# Patient Record
Sex: MALE | Race: BLACK OR AFRICAN AMERICAN | NOT HISPANIC OR LATINO | URBAN - METROPOLITAN AREA
[De-identification: names, ages, dates, MRNs, and addresses within clinical notes are randomized per-mention and may not be internally consistent; named-entity substitution may affect disease eponyms.]

---

## 2022-08-15 ENCOUNTER — OFFICE VISIT (OUTPATIENT)
Dept: URGENT CARE | Facility: CLINIC | Age: 42
End: 2022-08-15
Payer: COMMERCIAL

## 2022-08-15 ENCOUNTER — HOSPITAL ENCOUNTER (INPATIENT)
Facility: OTHER | Age: 42
LOS: 2 days | Discharge: HOME | DRG: 176 | End: 2022-08-18
Attending: EMERGENCY MEDICINE | Admitting: INTERNAL MEDICINE
Payer: COMMERCIAL

## 2022-08-15 VITALS
HEIGHT: 71 IN | RESPIRATION RATE: 28 BRPM | DIASTOLIC BLOOD PRESSURE: 117 MMHG | BODY MASS INDEX: 44.1 KG/M2 | SYSTOLIC BLOOD PRESSURE: 192 MMHG | TEMPERATURE: 98 F | WEIGHT: 315 LBS | HEART RATE: 140 BPM | OXYGEN SATURATION: 91 %

## 2022-08-15 DIAGNOSIS — R06.00 DYSPNEA: ICD-10-CM

## 2022-08-15 DIAGNOSIS — I26.99 BILATERAL PULMONARY EMBOLISM: Primary | ICD-10-CM

## 2022-08-15 DIAGNOSIS — R79.89 ELEVATED TROPONIN: ICD-10-CM

## 2022-08-15 DIAGNOSIS — I26.99 PULMONARY EMBOLISM: ICD-10-CM

## 2022-08-15 DIAGNOSIS — Z86.718 HISTORY OF DVT (DEEP VEIN THROMBOSIS): ICD-10-CM

## 2022-08-15 DIAGNOSIS — R79.81 LOW OXYGEN SATURATION: ICD-10-CM

## 2022-08-15 DIAGNOSIS — R06.02 SHORTNESS OF BREATH: Primary | ICD-10-CM

## 2022-08-15 LAB
ANION GAP SERPL CALC-SCNC: 13 MMOL/L (ref 8–16)
BASOPHILS # BLD AUTO: 0.02 K/UL (ref 0–0.2)
BASOPHILS NFR BLD: 0.2 % (ref 0–1.9)
BNP SERPL-MCNC: 12 PG/ML (ref 0–99)
BUN SERPL-MCNC: 11 MG/DL (ref 6–20)
CALCIUM SERPL-MCNC: 9.2 MG/DL (ref 8.7–10.5)
CHLORIDE SERPL-SCNC: 103 MMOL/L (ref 95–110)
CO2 SERPL-SCNC: 24 MMOL/L (ref 23–29)
CREAT SERPL-MCNC: 1 MG/DL (ref 0.5–1.4)
CREAT SERPL-MCNC: 1.1 MG/DL (ref 0.5–1.4)
CTP QC/QA: YES
DIFFERENTIAL METHOD: ABNORMAL
EOSINOPHIL # BLD AUTO: 0 K/UL (ref 0–0.5)
EOSINOPHIL NFR BLD: 0.2 % (ref 0–8)
ERYTHROCYTE [DISTWIDTH] IN BLOOD BY AUTOMATED COUNT: 13.3 % (ref 11.5–14.5)
EST. GFR  (NO RACE VARIABLE): >60 ML/MIN/1.73 M^2
GLUCOSE SERPL-MCNC: 189 MG/DL (ref 70–110)
HCT VFR BLD AUTO: 43.2 % (ref 40–54)
HCV AB SERPL QL IA: NEGATIVE
HGB BLD-MCNC: 13.9 G/DL (ref 14–18)
HIV 1+2 AB+HIV1 P24 AG SERPL QL IA: NEGATIVE
IMM GRANULOCYTES # BLD AUTO: 0.02 K/UL (ref 0–0.04)
IMM GRANULOCYTES NFR BLD AUTO: 0.2 % (ref 0–0.5)
LYMPHOCYTES # BLD AUTO: 1.2 K/UL (ref 1–4.8)
LYMPHOCYTES NFR BLD: 11.3 % (ref 18–48)
MCH RBC QN AUTO: 26.8 PG (ref 27–31)
MCHC RBC AUTO-ENTMCNC: 32.2 G/DL (ref 32–36)
MCV RBC AUTO: 83 FL (ref 82–98)
MONOCYTES # BLD AUTO: 0.7 K/UL (ref 0.3–1)
MONOCYTES NFR BLD: 6.6 % (ref 4–15)
NEUTROPHILS # BLD AUTO: 9 K/UL (ref 1.8–7.7)
NEUTROPHILS NFR BLD: 81.5 % (ref 38–73)
NRBC BLD-RTO: 0 /100 WBC
PLATELET # BLD AUTO: 171 K/UL (ref 150–450)
PMV BLD AUTO: 11.1 FL (ref 9.2–12.9)
POTASSIUM SERPL-SCNC: 4.5 MMOL/L (ref 3.5–5.1)
RBC # BLD AUTO: 5.18 M/UL (ref 4.6–6.2)
SAMPLE: NORMAL
SARS-COV-2 RDRP RESP QL NAA+PROBE: NEGATIVE
SODIUM SERPL-SCNC: 140 MMOL/L (ref 136–145)
TROPONIN I SERPL DL<=0.01 NG/ML-MCNC: 1.41 NG/ML (ref 0–0.03)
WBC # BLD AUTO: 10.97 K/UL (ref 3.9–12.7)

## 2022-08-15 PROCEDURE — 1159F MED LIST DOCD IN RCRD: CPT | Mod: CPTII,S$GLB,, | Performed by: NURSE PRACTITIONER

## 2022-08-15 PROCEDURE — 1159F PR MEDICATION LIST DOCUMENTED IN MEDICAL RECORD: ICD-10-PCS | Mod: CPTII,S$GLB,, | Performed by: NURSE PRACTITIONER

## 2022-08-15 PROCEDURE — 1160F PR REVIEW ALL MEDS BY PRESCRIBER/CLIN PHARMACIST DOCUMENTED: ICD-10-PCS | Mod: CPTII,S$GLB,, | Performed by: NURSE PRACTITIONER

## 2022-08-15 PROCEDURE — 83880 ASSAY OF NATRIURETIC PEPTIDE: CPT | Performed by: EMERGENCY MEDICINE

## 2022-08-15 PROCEDURE — 3008F BODY MASS INDEX DOCD: CPT | Mod: CPTII,S$GLB,, | Performed by: NURSE PRACTITIONER

## 2022-08-15 PROCEDURE — 80048 BASIC METABOLIC PNL TOTAL CA: CPT | Performed by: EMERGENCY MEDICINE

## 2022-08-15 PROCEDURE — 84484 ASSAY OF TROPONIN QUANT: CPT | Performed by: EMERGENCY MEDICINE

## 2022-08-15 PROCEDURE — 25500020 PHARM REV CODE 255: Performed by: EMERGENCY MEDICINE

## 2022-08-15 PROCEDURE — 3080F PR MOST RECENT DIASTOLIC BLOOD PRESSURE >= 90 MM HG: ICD-10-PCS | Mod: CPTII,S$GLB,, | Performed by: NURSE PRACTITIONER

## 2022-08-15 PROCEDURE — 93005 ELECTROCARDIOGRAM TRACING: CPT | Mod: S$GLB,,, | Performed by: NURSE PRACTITIONER

## 2022-08-15 PROCEDURE — 71046 X-RAY EXAM CHEST 2 VIEWS: CPT | Mod: S$GLB,,, | Performed by: RADIOLOGY

## 2022-08-15 PROCEDURE — 93010 EKG 12-LEAD: ICD-10-PCS | Mod: S$GLB,,, | Performed by: INTERNAL MEDICINE

## 2022-08-15 PROCEDURE — 71046 XR CHEST PA AND LATERAL: ICD-10-PCS | Mod: S$GLB,,, | Performed by: RADIOLOGY

## 2022-08-15 PROCEDURE — 99205 OFFICE O/P NEW HI 60 MIN: CPT | Mod: S$GLB,,, | Performed by: NURSE PRACTITIONER

## 2022-08-15 PROCEDURE — 3008F PR BODY MASS INDEX (BMI) DOCUMENTED: ICD-10-PCS | Mod: CPTII,S$GLB,, | Performed by: NURSE PRACTITIONER

## 2022-08-15 PROCEDURE — 1160F RVW MEDS BY RX/DR IN RCRD: CPT | Mod: CPTII,S$GLB,, | Performed by: NURSE PRACTITIONER

## 2022-08-15 PROCEDURE — 3080F DIAST BP >= 90 MM HG: CPT | Mod: CPTII,S$GLB,, | Performed by: NURSE PRACTITIONER

## 2022-08-15 PROCEDURE — 3077F SYST BP >= 140 MM HG: CPT | Mod: CPTII,S$GLB,, | Performed by: NURSE PRACTITIONER

## 2022-08-15 PROCEDURE — 93005 EKG 12-LEAD: ICD-10-PCS | Mod: S$GLB,,, | Performed by: NURSE PRACTITIONER

## 2022-08-15 PROCEDURE — 86803 HEPATITIS C AB TEST: CPT | Performed by: EMERGENCY MEDICINE

## 2022-08-15 PROCEDURE — 93010 ELECTROCARDIOGRAM REPORT: CPT | Mod: S$GLB,,, | Performed by: INTERNAL MEDICINE

## 2022-08-15 PROCEDURE — 3077F PR MOST RECENT SYSTOLIC BLOOD PRESSURE >= 140 MM HG: ICD-10-PCS | Mod: CPTII,S$GLB,, | Performed by: NURSE PRACTITIONER

## 2022-08-15 PROCEDURE — U0002 COVID-19 LAB TEST NON-CDC: HCPCS | Mod: QW,S$GLB,, | Performed by: NURSE PRACTITIONER

## 2022-08-15 PROCEDURE — 85025 COMPLETE CBC W/AUTO DIFF WBC: CPT | Performed by: EMERGENCY MEDICINE

## 2022-08-15 PROCEDURE — 87389 HIV-1 AG W/HIV-1&-2 AB AG IA: CPT | Performed by: EMERGENCY MEDICINE

## 2022-08-15 PROCEDURE — 99205 PR OFFICE/OUTPT VISIT, NEW, LEVL V, 60-74 MIN: ICD-10-PCS | Mod: S$GLB,,, | Performed by: NURSE PRACTITIONER

## 2022-08-15 PROCEDURE — U0002: ICD-10-PCS | Mod: QW,S$GLB,, | Performed by: NURSE PRACTITIONER

## 2022-08-15 PROCEDURE — 99285 EMERGENCY DEPT VISIT HI MDM: CPT | Mod: 25

## 2022-08-15 RX ORDER — ALBUTEROL SULFATE 90 UG/1
1-2 AEROSOL, METERED RESPIRATORY (INHALATION) EVERY 4 HOURS PRN
Status: ON HOLD | COMMUNITY
Start: 2021-09-09 | End: 2022-08-18 | Stop reason: SDUPTHER

## 2022-08-15 RX ORDER — APIXABAN 5 MG/1
5 TABLET, FILM COATED ORAL 2 TIMES DAILY
Status: ON HOLD | COMMUNITY
Start: 2022-06-12 | End: 2022-08-18 | Stop reason: HOSPADM

## 2022-08-15 RX ORDER — METFORMIN HYDROCHLORIDE 500 MG/1
500 TABLET ORAL DAILY
COMMUNITY
Start: 2021-09-10 | End: 2022-08-25

## 2022-08-15 RX ADMIN — IOHEXOL 100 ML: 350 INJECTION, SOLUTION INTRAVENOUS at 11:08

## 2022-08-15 NOTE — PROGRESS NOTES
"Subjective:       Patient ID: Hany Devries is a 42 y.o. male.    Vitals:  height is 5' 11" (1.803 m) and weight is 158.8 kg (350 lb) (abnormal). His tympanic temperature is 98.1 °F (36.7 °C). His blood pressure is 192/117 (abnormal) and his pulse is 140 (abnormal). His respiration is 28 (abnormal) and oxygen saturation is 91% (abnormal).     Chief Complaint: Shortness of Breath    Patient reports dyspnea upon exertion.  He notes that he had an episode after moving boxes out of his trunk earlier today and now when he walks or exerts himself.  He had some palpitations and chest pain this morning.  He notes that he's under a lot of stress with recent move, trying to buy a house, and having his daughter start school.  Denies chest pain at present.  History of asthma, he used his inhaler which helped some but did not really.  Denies cough, fever, congestion.  He has had driving back and forth to [place] recently with move.  He denies leg pain or swelling.  He does have a history of DVTs but is not currently on his Eliquis due to change in insurance and difficulty getting pre approval.      Shortness of Breath  This is a new problem. The current episode started today. The problem occurs constantly. The problem has been gradually improving. Associated symptoms include chest pain. Pertinent negatives include no abdominal pain, claudication, coryza, ear pain, fever, headaches, hemoptysis, leg pain, leg swelling, neck pain, orthopnea, PND, rash, rhinorrhea, sore throat, sputum production, swollen glands, syncope, vomiting or wheezing. The symptoms are aggravated by any activity. Associated symptoms comments: Chest pain. Treatments tried: Inhaler. The treatment provided mild relief. His past medical history is significant for asthma.       Constitution: Negative for fever.   HENT: Negative for ear pain and sore throat.    Neck: Negative for neck pain.   Cardiovascular: Positive for chest pain. Negative for leg swelling and " passing out.   Respiratory: Positive for chest tightness, shortness of breath and asthma. Negative for sputum production, bloody sputum and wheezing.    Gastrointestinal: Negative for abdominal pain and vomiting.   Skin: Negative for rash.   Allergic/Immunologic: Positive for asthma.   Neurological: Negative for headaches.   Psychiatric/Behavioral: Positive for nervous/anxious. The patient is nervous/anxious.        Objective:      Physical Exam   Constitutional: He is oriented to person, place, and time. He appears well-developed. He is cooperative.  Non-toxic appearance. He does not appear ill. No distress. obesity  HENT:   Head: Normocephalic and atraumatic.   Ears:   Right Ear: Hearing, tympanic membrane, external ear and ear canal normal.   Left Ear: Hearing, tympanic membrane, external ear and ear canal normal.   Nose: Nose normal. No mucosal edema, rhinorrhea or nasal deformity. No epistaxis. Right sinus exhibits no maxillary sinus tenderness and no frontal sinus tenderness. Left sinus exhibits no maxillary sinus tenderness and no frontal sinus tenderness.   Mouth/Throat: Uvula is midline, oropharynx is clear and moist and mucous membranes are normal. No trismus in the jaw. Normal dentition. No uvula swelling. No posterior oropharyngeal erythema.   Eyes: Conjunctivae and lids are normal. Right eye exhibits no discharge. Left eye exhibits no discharge. No scleral icterus.   Neck: Trachea normal and phonation normal. Neck supple.   Cardiovascular: Normal rate, regular rhythm, normal heart sounds and normal pulses.   Pulmonary/Chest: Effort normal and breath sounds normal. Tachypnea noted. No respiratory distress. He has no wheezes.   Abdominal: Normal appearance and bowel sounds are normal. He exhibits no distension, no pulsatile midline mass and no mass. Soft. There is no abdominal tenderness.   Musculoskeletal: Normal range of motion.         General: No deformity. Normal range of motion.   Neurological: He  is alert and oriented to person, place, and time. He exhibits normal muscle tone. Coordination normal.   Skin: Skin is warm, dry, intact, not diaphoretic and not pale.   Psychiatric: His speech is normal and behavior is normal. Judgment and thought content normal.   Nursing note and vitals reviewed.    Results for orders placed or performed in visit on 08/15/22   POCT COVID-19 Rapid Screening   Result Value Ref Range    POC Rapid COVID Negative Negative     Acceptable Yes      X-Ray Chest PA And Lateral    Result Date: 8/15/2022  EXAMINATION: XR CHEST PA AND LATERAL CLINICAL HISTORY: Shortness of breath TECHNIQUE: PA and lateral views of the chest were performed. COMPARISON: None FINDINGS: The lungs are well expanded and clear. No focal opacities are seen. The pleural spaces are clear. The cardiac silhouette is unremarkable. The visualized osseous structures are unremarkable.     No acute abnormality. Electronically signed by: Alex Espana Date:    08/15/2022 Time:    19:23        Assessment:       1. Shortness of breath    2. Low oxygen saturation    3. History of DVT (deep vein thrombosis)          Plan:       Patient w/ sitting initially had room air pulse ox of 95-96 % on room air pulse ox.  However, I had him ambulate up and down the bazan and he did have dyspnea upon exertion with room air pulse ox dipping into 80s to low 90s at rest with tachycardia.  EKG shows sinus tachycardia with no acute ST findings.  Concern for PE rule out with history of obesity, shortness of breath upon exertion, low oxygen, tachycardia with history of DVTs and not being on his blood thinners.  No recent leg pain or swelling.  No pain with inspiration.  Here with wife who is going to bring him to the ER.  Case discussed with Dr. Izaguirre in clinic who agrees to MDM and plan of care.  [facility] contacted and I gave report to Samira in the ER.  Covid 19 negative and chest xray negative.  Shortness of breath  -     EKG  12-lead  -     POCT COVID-19 Rapid Screening  -     X-Ray Chest PA And Lateral; Future; Expected date: 08/15/2022  -     Refer to Emergency Dept.    Low oxygen saturation  -     Refer to Emergency Dept.    History of DVT (deep vein thrombosis)  -     Refer to Emergency Dept.

## 2022-08-16 PROBLEM — R79.89 ELEVATED TROPONIN: Status: ACTIVE | Noted: 2022-08-16

## 2022-08-16 PROBLEM — I26.99 ACUTE PULMONARY EMBOLISM: Status: ACTIVE | Noted: 2022-08-16

## 2022-08-16 LAB
ALBUMIN SERPL BCP-MCNC: 3.7 G/DL (ref 3.5–5.2)
ALP SERPL-CCNC: 54 U/L (ref 55–135)
ALT SERPL W/O P-5'-P-CCNC: 39 U/L (ref 10–44)
ANION GAP SERPL CALC-SCNC: 12 MMOL/L (ref 8–16)
APTT BLDCRRT: 65.2 SEC (ref 21–32)
AST SERPL-CCNC: 32 U/L (ref 10–40)
AV INDEX (PROSTH): 0.71
AV MEAN GRADIENT: 2 MMHG
AV PEAK GRADIENT: 3 MMHG
AV VALVE AREA: 3.16 CM2
AV VELOCITY RATIO: 0.74
BASOPHILS # BLD AUTO: 0.01 K/UL (ref 0–0.2)
BASOPHILS # BLD AUTO: 0.02 K/UL (ref 0–0.2)
BASOPHILS # BLD AUTO: 0.02 K/UL (ref 0–0.2)
BASOPHILS NFR BLD: 0.1 % (ref 0–1.9)
BASOPHILS NFR BLD: 0.2 % (ref 0–1.9)
BASOPHILS NFR BLD: 0.2 % (ref 0–1.9)
BILIRUB SERPL-MCNC: 0.8 MG/DL (ref 0.1–1)
BSA FOR ECHO PROCEDURE: 2.89 M2
BUN SERPL-MCNC: 8 MG/DL (ref 6–20)
CALCIUM SERPL-MCNC: 9.2 MG/DL (ref 8.7–10.5)
CHLORIDE SERPL-SCNC: 104 MMOL/L (ref 95–110)
CO2 SERPL-SCNC: 24 MMOL/L (ref 23–29)
CREAT SERPL-MCNC: 1.1 MG/DL (ref 0.5–1.4)
CV ECHO LV RWT: 0.73 CM
DIFFERENTIAL METHOD: ABNORMAL
DOP CALC AO PEAK VEL: 0.91 M/S
DOP CALC AO VTI: 14.97 CM
DOP CALC LVOT AREA: 4.5 CM2
DOP CALC LVOT DIAMETER: 2.39 CM
DOP CALC LVOT PEAK VEL: 0.67 M/S
DOP CALC LVOT STROKE VOLUME: 47.35 CM3
DOP CALCLVOT PEAK VEL VTI: 10.56 CM
E WAVE DECELERATION TIME: 92.03 MSEC
E/E' RATIO: 9.88 M/S
ECHO LV POSTERIOR WALL: 1.35 CM (ref 0.6–1.1)
EJECTION FRACTION: 65 %
EOSINOPHIL # BLD AUTO: 0.1 K/UL (ref 0–0.5)
EOSINOPHIL # BLD AUTO: 0.2 K/UL (ref 0–0.5)
EOSINOPHIL # BLD AUTO: 0.2 K/UL (ref 0–0.5)
EOSINOPHIL NFR BLD: 0.6 % (ref 0–8)
EOSINOPHIL NFR BLD: 1.4 % (ref 0–8)
EOSINOPHIL NFR BLD: 1.4 % (ref 0–8)
ERYTHROCYTE [DISTWIDTH] IN BLOOD BY AUTOMATED COUNT: 13.3 % (ref 11.5–14.5)
EST. GFR  (NO RACE VARIABLE): >60 ML/MIN/1.73 M^2
ESTIMATED AVG GLUCOSE: 169 MG/DL (ref 68–131)
FRACTIONAL SHORTENING: 37 % (ref 28–44)
GLUCOSE SERPL-MCNC: 166 MG/DL (ref 70–110)
HBA1C MFR BLD: 7.5 % (ref 4–5.6)
HCT VFR BLD AUTO: 42.6 % (ref 40–54)
HCT VFR BLD AUTO: 42.6 % (ref 40–54)
HCT VFR BLD AUTO: 42.8 % (ref 40–54)
HGB BLD-MCNC: 13.8 G/DL (ref 14–18)
HGB BLD-MCNC: 13.8 G/DL (ref 14–18)
HGB BLD-MCNC: 14.2 G/DL (ref 14–18)
IMM GRANULOCYTES # BLD AUTO: 0.03 K/UL (ref 0–0.04)
IMM GRANULOCYTES # BLD AUTO: 0.04 K/UL (ref 0–0.04)
IMM GRANULOCYTES # BLD AUTO: 0.04 K/UL (ref 0–0.04)
IMM GRANULOCYTES NFR BLD AUTO: 0.3 % (ref 0–0.5)
IMM GRANULOCYTES NFR BLD AUTO: 0.4 % (ref 0–0.5)
IMM GRANULOCYTES NFR BLD AUTO: 0.4 % (ref 0–0.5)
INTERVENTRICULAR SEPTUM: 1.69 CM (ref 0.6–1.1)
IVRT: 60.89 MSEC
LA MAJOR: 5.22 CM
LA MINOR: 4.61 CM
LA WIDTH: 3.25 CM
LEFT ATRIUM SIZE: 3.31 CM
LEFT ATRIUM VOLUME INDEX MOD: 21.6 ML/M2
LEFT ATRIUM VOLUME INDEX: 16.4 ML/M2
LEFT ATRIUM VOLUME MOD: 59 CM3
LEFT ATRIUM VOLUME: 44.77 CM3
LEFT INTERNAL DIMENSION IN SYSTOLE: 2.32 CM (ref 2.1–4)
LEFT VENTRICLE DIASTOLIC VOLUME INDEX: 21.37 ML/M2
LEFT VENTRICLE DIASTOLIC VOLUME: 58.33 ML
LEFT VENTRICLE MASS INDEX: 78 G/M2
LEFT VENTRICLE SYSTOLIC VOLUME INDEX: 6.8 ML/M2
LEFT VENTRICLE SYSTOLIC VOLUME: 18.6 ML
LEFT VENTRICULAR INTERNAL DIMENSION IN DIASTOLE: 3.71 CM (ref 3.5–6)
LEFT VENTRICULAR MASS: 213.99 G
LV LATERAL E/E' RATIO: 9.88 M/S
LV SEPTAL E/E' RATIO: 9.88 M/S
LYMPHOCYTES # BLD AUTO: 2.2 K/UL (ref 1–4.8)
LYMPHOCYTES # BLD AUTO: 2.7 K/UL (ref 1–4.8)
LYMPHOCYTES # BLD AUTO: 2.7 K/UL (ref 1–4.8)
LYMPHOCYTES NFR BLD: 20.4 % (ref 18–48)
LYMPHOCYTES NFR BLD: 25.4 % (ref 18–48)
LYMPHOCYTES NFR BLD: 25.4 % (ref 18–48)
MAGNESIUM SERPL-MCNC: 1.8 MG/DL (ref 1.6–2.6)
MCH RBC QN AUTO: 26.6 PG (ref 27–31)
MCH RBC QN AUTO: 26.6 PG (ref 27–31)
MCH RBC QN AUTO: 26.9 PG (ref 27–31)
MCHC RBC AUTO-ENTMCNC: 32.4 G/DL (ref 32–36)
MCHC RBC AUTO-ENTMCNC: 32.4 G/DL (ref 32–36)
MCHC RBC AUTO-ENTMCNC: 33.2 G/DL (ref 32–36)
MCV RBC AUTO: 81 FL (ref 82–98)
MCV RBC AUTO: 82 FL (ref 82–98)
MCV RBC AUTO: 82 FL (ref 82–98)
MONOCYTES # BLD AUTO: 0.8 K/UL (ref 0.3–1)
MONOCYTES NFR BLD: 7.1 % (ref 4–15)
MONOCYTES NFR BLD: 7.7 % (ref 4–15)
MONOCYTES NFR BLD: 7.7 % (ref 4–15)
MV PEAK E VEL: 0.79 M/S
MV STENOSIS PRESSURE HALF TIME: 26.69 MS
MV VALVE AREA P 1/2 METHOD: 8.24 CM2
NEUTROPHILS # BLD AUTO: 6.9 K/UL (ref 1.8–7.7)
NEUTROPHILS # BLD AUTO: 6.9 K/UL (ref 1.8–7.7)
NEUTROPHILS # BLD AUTO: 7.7 K/UL (ref 1.8–7.7)
NEUTROPHILS NFR BLD: 64.9 % (ref 38–73)
NEUTROPHILS NFR BLD: 64.9 % (ref 38–73)
NEUTROPHILS NFR BLD: 71.5 % (ref 38–73)
NRBC BLD-RTO: 0 /100 WBC
PHOSPHATE SERPL-MCNC: 4.1 MG/DL (ref 2.7–4.5)
PISA TR MAX VEL: 2.9 M/S
PLATELET # BLD AUTO: 169 K/UL (ref 150–450)
PLATELET # BLD AUTO: 169 K/UL (ref 150–450)
PLATELET # BLD AUTO: ABNORMAL K/UL (ref 150–450)
PLATELET BLD QL SMEAR: ABNORMAL
PMV BLD AUTO: 11.1 FL (ref 9.2–12.9)
PMV BLD AUTO: 11.2 FL (ref 9.2–12.9)
PMV BLD AUTO: 11.2 FL (ref 9.2–12.9)
POCT GLUCOSE: 139 MG/DL (ref 70–110)
POCT GLUCOSE: 139 MG/DL (ref 70–110)
POCT GLUCOSE: 167 MG/DL (ref 70–110)
POCT GLUCOSE: 192 MG/DL (ref 70–110)
POTASSIUM SERPL-SCNC: 4.2 MMOL/L (ref 3.5–5.1)
PROT SERPL-MCNC: 7.3 G/DL (ref 6–8.4)
PULM VEIN S/D RATIO: 1.43
PV PEAK D VEL: 0.28 M/S
PV PEAK S VEL: 0.4 M/S
PV PEAK VELOCITY: 0.95 CM/S
RA MAJOR: 4.88 CM
RA WIDTH: 3.68 CM
RBC # BLD AUTO: 5.19 M/UL (ref 4.6–6.2)
RBC # BLD AUTO: 5.19 M/UL (ref 4.6–6.2)
RBC # BLD AUTO: 5.27 M/UL (ref 4.6–6.2)
SINUS: 3.22 CM
SODIUM SERPL-SCNC: 140 MMOL/L (ref 136–145)
TDI LATERAL: 0.08 M/S
TDI SEPTAL: 0.08 M/S
TDI: 0.08 M/S
TR MAX PG: 34 MMHG
TROPONIN I SERPL DL<=0.01 NG/ML-MCNC: 1.33 NG/ML (ref 0–0.03)
TROPONIN I SERPL DL<=0.01 NG/ML-MCNC: 2.1 NG/ML (ref 0–0.03)
WBC # BLD AUTO: 10.6 K/UL (ref 3.9–12.7)
WBC # BLD AUTO: 10.6 K/UL (ref 3.9–12.7)
WBC # BLD AUTO: 10.77 K/UL (ref 3.9–12.7)

## 2022-08-16 PROCEDURE — 85730 THROMBOPLASTIN TIME PARTIAL: CPT | Performed by: EMERGENCY MEDICINE

## 2022-08-16 PROCEDURE — 85025 COMPLETE CBC W/AUTO DIFF WBC: CPT | Performed by: EMERGENCY MEDICINE

## 2022-08-16 PROCEDURE — 99223 PR INITIAL HOSPITAL CARE,LEVL III: ICD-10-PCS | Mod: ,,, | Performed by: NURSE PRACTITIONER

## 2022-08-16 PROCEDURE — 63600175 PHARM REV CODE 636 W HCPCS: Performed by: EMERGENCY MEDICINE

## 2022-08-16 PROCEDURE — 99223 PR INITIAL HOSPITAL CARE,LEVL III: ICD-10-PCS | Mod: 25,,, | Performed by: INTERNAL MEDICINE

## 2022-08-16 PROCEDURE — 83735 ASSAY OF MAGNESIUM: CPT | Performed by: NURSE PRACTITIONER

## 2022-08-16 PROCEDURE — 11000001 HC ACUTE MED/SURG PRIVATE ROOM

## 2022-08-16 PROCEDURE — 63600175 PHARM REV CODE 636 W HCPCS: Performed by: INTERNAL MEDICINE

## 2022-08-16 PROCEDURE — 36415 COLL VENOUS BLD VENIPUNCTURE: CPT | Performed by: NURSE PRACTITIONER

## 2022-08-16 PROCEDURE — 80053 COMPREHEN METABOLIC PANEL: CPT | Performed by: NURSE PRACTITIONER

## 2022-08-16 PROCEDURE — 83036 HEMOGLOBIN GLYCOSYLATED A1C: CPT | Performed by: NURSE PRACTITIONER

## 2022-08-16 PROCEDURE — 99223 1ST HOSP IP/OBS HIGH 75: CPT | Mod: 25,,, | Performed by: INTERNAL MEDICINE

## 2022-08-16 PROCEDURE — 99223 1ST HOSP IP/OBS HIGH 75: CPT | Mod: ,,, | Performed by: NURSE PRACTITIONER

## 2022-08-16 PROCEDURE — 84100 ASSAY OF PHOSPHORUS: CPT | Performed by: NURSE PRACTITIONER

## 2022-08-16 PROCEDURE — 84484 ASSAY OF TROPONIN QUANT: CPT | Mod: 91 | Performed by: NURSE PRACTITIONER

## 2022-08-16 RX ORDER — GLUCAGON 1 MG
1 KIT INJECTION
Status: DISCONTINUED | OUTPATIENT
Start: 2022-08-16 | End: 2022-08-18 | Stop reason: HOSPADM

## 2022-08-16 RX ORDER — SODIUM CHLORIDE 0.9 % (FLUSH) 0.9 %
10 SYRINGE (ML) INJECTION EVERY 8 HOURS PRN
Status: DISCONTINUED | OUTPATIENT
Start: 2022-08-16 | End: 2022-08-18 | Stop reason: HOSPADM

## 2022-08-16 RX ORDER — ACETAMINOPHEN 325 MG/1
650 TABLET ORAL EVERY 4 HOURS PRN
Status: DISCONTINUED | OUTPATIENT
Start: 2022-08-16 | End: 2022-08-18 | Stop reason: HOSPADM

## 2022-08-16 RX ORDER — ENOXAPARIN SODIUM 100 MG/ML
1 INJECTION SUBCUTANEOUS
Status: DISCONTINUED | OUTPATIENT
Start: 2022-08-16 | End: 2022-08-18 | Stop reason: HOSPADM

## 2022-08-16 RX ORDER — ONDANSETRON 2 MG/ML
4 INJECTION INTRAMUSCULAR; INTRAVENOUS EVERY 8 HOURS PRN
Status: DISCONTINUED | OUTPATIENT
Start: 2022-08-16 | End: 2022-08-18 | Stop reason: HOSPADM

## 2022-08-16 RX ORDER — IBUPROFEN 200 MG
24 TABLET ORAL
Status: DISCONTINUED | OUTPATIENT
Start: 2022-08-16 | End: 2022-08-18 | Stop reason: HOSPADM

## 2022-08-16 RX ORDER — HEPARIN SODIUM,PORCINE/D5W 25000/250
0-40 INTRAVENOUS SOLUTION INTRAVENOUS CONTINUOUS
Status: DISCONTINUED | OUTPATIENT
Start: 2022-08-16 | End: 2022-08-16

## 2022-08-16 RX ORDER — NALOXONE HCL 0.4 MG/ML
0.02 VIAL (ML) INJECTION
Status: DISCONTINUED | OUTPATIENT
Start: 2022-08-16 | End: 2022-08-18 | Stop reason: HOSPADM

## 2022-08-16 RX ORDER — INSULIN ASPART 100 [IU]/ML
0-5 INJECTION, SOLUTION INTRAVENOUS; SUBCUTANEOUS
Status: DISCONTINUED | OUTPATIENT
Start: 2022-08-16 | End: 2022-08-18 | Stop reason: HOSPADM

## 2022-08-16 RX ORDER — IBUPROFEN 200 MG
16 TABLET ORAL
Status: DISCONTINUED | OUTPATIENT
Start: 2022-08-16 | End: 2022-08-18 | Stop reason: HOSPADM

## 2022-08-16 RX ADMIN — HEPARIN SODIUM 18 UNITS/KG/HR: 10000 INJECTION, SOLUTION INTRAVENOUS at 01:08

## 2022-08-16 RX ADMIN — ENOXAPARIN SODIUM 170 MG: 100 INJECTION SUBCUTANEOUS at 08:08

## 2022-08-16 NOTE — ED PROVIDER NOTES
Source of History:  Patient    Chief complaint:  Shortness of Breath (SOB since 4pm this evening and becomes extreme when ambulating./Pt has been dx with blood clots since 2015 and was told by urgent care to go into ED to rule out PE.)      HPI:  Hany Devries is a 42 y.o. male presenting with history of asthma and chronic DVTs of the both lower extremities currently off of his Eliquis over the past 2 and half weeks presents complaining of dyspnea with exertion.  Patient states that his symptoms started this afternoon.  He was moving boxes into a storage unit and felt sudden onset of shortness of breath when he bent down to  a box.  He was able to complete moving all the boxes into a storage unit without any difficulty, and he states that his dyspnea improved with sitting in his air condition car and using his inhaler once.  He reports feeling a 2nd episode of dyspnea while at home that resolved quicker than the 1st also after using his inhaler.  Given his history, he did present to Urgent Care for evaluation and was directed to the emergency department for a PE rule out.  He denies any previous PE.  He was last diagnosed with a DVT approximately 6 months ago.  He has been off of his Eliquis secondary to his new insurance needing pre approval.  He denies any recent illness including fevers, chills, cough.  He also denies any PND or orthopnea.  He does report recent increased stress.  He currently denies any dyspnea, dizziness, or lightheadedness.    This is the extent to the patients complaints today here in the emergency department.    ROS: As per HPI and below:  Constitutional: No fever.  No chills.  Eyes: No visual changes.  ENT: No sore throat. No ear pain    Cardiovascular: chest pain.  Respiratory: shortness of breath.  GI: No abdominal pain.  No nausea or vomiting.  Genitourinary: No abnormal urination.  Neurologic: No headache. No focal weakness.  No numbness.  MSK: no back pain.  Integument: No  "rashes or lesions.  Hematologic: No easy bruising.  Endocrine: No excessive thirst or urination.    Review of patient's allergies indicates:  No Known Allergies    PMH:  As per HPI and below:  Past Medical History:   Diagnosis Date    Diabetes mellitus, type 2     DVT (deep venous thrombosis)      Past Surgical History:   Procedure Laterality Date    ANKLE SURGERY      HERNIA REPAIR         Social History     Tobacco Use    Smoking status: Never Smoker    Smokeless tobacco: Never Used   Substance Use Topics    Drug use: Never       Physical Exam:    BP (!) 142/104 (BP Location: Right arm, Patient Position: Lying)   Pulse 96   Temp 98.7 °F (37.1 °C) (Oral)   Resp 17   Ht 5' 11" (1.803 m)   Wt (!) 166.5 kg (367 lb)   SpO2 97%   BMI 51.19 kg/m²   Nursing note and vital signs reviewed.  Constitutional: No acute distress.  Nontoxic  Eyes: No conjunctival injection.Extraocular muscles are intact.  ENT: Oropharynx clear.  Normal phonation.   Cardiovascular: Regular rate and rhythm.  No murmurs. No gallops. No rubs  Respiratory: Clear to auscultation bilaterally.  Good air movement.  No wheezes.  No rhonchi. No rales. No accessory muscle use..  Abdomen: Soft.  Not distended.  Nontender.  No guarding.  No rebound. Non-peritoneal.  Musculoskeletal: Good range of motion all joints.  No deformities.  Neck supple.  No meningismus.  Skin: No rashes seen.  Good turgor.  No abrasions.  No ecchymoses.  Neurologic: Motor intact.  Sensation intact.  No ataxia. No focal neurological deficits.  Psych: Appropriate, conversant    Labs that have been ordered have been independently reviewed and interpreted by myself.    I decided to obtain the patient's medical records.  Summary of Medical Records:  Imaging Results           CTA Chest Non-Coronary (PE Study) (Final result)  Result time 08/16/22 00:08:11    Final result by Frank Branham MD (08/16/22 00:08:11)                 Impression:      1. Bilateral pulmonary emboli " including distal main and segmental pulmonary arteries on the right and segmental pulmonary arteries on the left.  Follow-up recommended.  2.  This report was flagged in Epic as abnormal.      Electronically signed by: Frank Dixon  Date:    08/16/2022  Time:    00:08             Narrative:    EXAMINATION:  CTA CHEST NON CORONARY    CLINICAL HISTORY:  Pulmonary embolism (PE) suspected, unknown D-dimer;    TECHNIQUE:  Low dose axial images, sagittal and coronal reformations were obtained from the thoracic inlet to the lung bases following the IV administration of 100 mL of Omnipaque 350.  Contrast timing was optimized to evaluate the pulmonary arteries.  MIP images were performed.    COMPARISON:  None    FINDINGS:  Pulmonary arteries:Pulmonary arteries are suboptimally enhanced.There are filling defects in the distal right main and proximal segmental pulmonary arteries in upper and lower lobes on the right.  Left lower and left upper lobe segmental pulmonary emboli on the left also.  Follow-up recommended.    Thoracic soft tissues: Unremarkable.    Aorta: Left-sided aortic arch.  No aneurysm or dissection.    Heart: Normal size. No effusion.    Tania/Mediastinum: No pathologic isis enlargement.    Airways: Patent.    Lungs/Pleura: Clear lungs. No pleural effusion or thickening.    Esophagus: Unremarkable.    Upper Abdomen: No abnormality of the partially imaged upper abdomen.    Bones: No acute fracture. No suspicious lytic or sclerotic lesions.                                MDM/ Differential Dx:    42 y.o. male with with history of asthma and chronic DVTs presents with dyspnea on exertion.  Triage vital signs significant for tachycardia and elevated blood pressure.  The patient does admit to feeling anxious about being in the emergency department.  Given his significant history, will obtain CT PE study in addition to lab work and an EKG.    ED Course as of 08/16/22 0242   Mon Aug 15, 2022   7474 Troponin I(!):  1.405 [AA]      ED Course User Index  [AA] Ani Mejia MD     Patient's CT significant for bilateral segmental PEs and the distal right main.  EKG consistent with sinus tachycardia but otherwise within normal limits.  Troponin is elevated likely secondary from strain.      There is ED stated patient has remained hemodynamically stable and in no respiratory distress.  Patient was started on heparin and will be admitted to the hospitalist service for further evaluation and management.  The case has been discussed with Marty Rose.              Diagnostic Impression:    1. Bilateral pulmonary embolism    2. Dyspnea    3. Elevated troponin         ED Disposition Condition    Admit                 Ani Mejia MD  08/16/22 0242

## 2022-08-16 NOTE — SUBJECTIVE & OBJECTIVE
Past Medical History:   Diagnosis Date    Diabetes mellitus, type 2     DVT (deep venous thrombosis)        Past Surgical History:   Procedure Laterality Date    ANKLE SURGERY      HERNIA REPAIR         Review of patient's allergies indicates:  No Known Allergies    No current facility-administered medications on file prior to encounter.     Current Outpatient Medications on File Prior to Encounter   Medication Sig    albuterol (PROVENTIL/VENTOLIN HFA) 90 mcg/actuation inhaler Take 1-2 puffs by mouth every 4 (four) hours as needed for Shortness of Breath or Wheezing.    ELIQUIS 5 mg Tab Take 5 mg by mouth 2 (two) times daily.    metFORMIN (GLUCOPHAGE) 500 MG tablet Take 500 mg by mouth once daily.     Family History       Family history is unknown by patient.          Tobacco Use    Smoking status: Never Smoker    Smokeless tobacco: Never Used   Substance and Sexual Activity    Alcohol use: Not on file    Drug use: Never    Sexual activity: Yes     Partners: Female     Review of Systems   Constitutional:  Positive for activity change and fatigue. Negative for appetite change and fever.   HENT:  Negative for congestion, ear pain and postnasal drip.    Eyes:  Negative for discharge.   Respiratory:  Positive for chest tightness and shortness of breath. Negative for apnea and wheezing.    Cardiovascular:  Positive for chest pain. Negative for leg swelling.   Gastrointestinal:  Negative for abdominal distention, abdominal pain, nausea and vomiting.   Endocrine: Negative for polydipsia, polyphagia and polyuria.   Genitourinary:  Negative for difficulty urinating, flank pain, frequency, hematuria and urgency.   Musculoskeletal:  Negative for arthralgias and joint swelling.   Skin:  Negative for pallor and rash.   Allergic/Immunologic: Negative for environmental allergies and food allergies.   Neurological:  Negative for dizziness, speech difficulty, weakness, light-headedness and headaches.   Hematological:  Does not  bruise/bleed easily.   Psychiatric/Behavioral:  Negative for agitation.    Objective:     Vital Signs (Most Recent):  Temp: 98.7 °F (37.1 °C) (08/16/22 0202)  Pulse: 96 (08/16/22 0202)  Resp: 17 (08/16/22 0202)  BP: (!) 142/104 (08/16/22 0202)  SpO2: 97 % (08/16/22 0215)   Vital Signs (24h Range):  Temp:  [98.1 °F (36.7 °C)-98.7 °F (37.1 °C)] 98.7 °F (37.1 °C)  Pulse:  [] 96  Resp:  [16-28] 17  SpO2:  [91 %-98 %] 97 %  BP: (142-215)/() 142/104     Weight: (!) 166.5 kg (367 lb)  Body mass index is 51.19 kg/m².    Physical Exam  Constitutional:       Appearance: Normal appearance. He is well-developed.   HENT:      Head: Normocephalic.   Eyes:      Conjunctiva/sclera: Conjunctivae normal.   Cardiovascular:      Rate and Rhythm: Regular rhythm. Tachycardia present.      Pulses:           Radial pulses are 2+ on the right side and 2+ on the left side.      Heart sounds: Normal heart sounds.   Pulmonary:      Effort: Pulmonary effort is normal.      Breath sounds: Normal breath sounds.   Abdominal:      General: Bowel sounds are normal. There is no distension.      Palpations: Abdomen is soft.      Tenderness: There is no abdominal tenderness.   Musculoskeletal:         General: Normal range of motion.      Cervical back: Normal range of motion and neck supple.   Skin:     General: Skin is warm and dry.   Neurological:      Mental Status: He is alert and oriented to person, place, and time.      GCS: GCS eye subscore is 4. GCS verbal subscore is 5. GCS motor subscore is 6.      Motor: Motor function is intact.   Psychiatric:         Mood and Affect: Mood normal.         Speech: Speech normal.         Behavior: Behavior normal.           Significant Labs: All pertinent labs within the past 24 hours have been reviewed.  CBC:   Recent Labs   Lab 08/15/22  2202 08/16/22  0101   WBC 10.97 10.77   HGB 13.9* 14.2   HCT 43.2 42.8    SEE COMMENT     CMP:   Recent Labs   Lab 08/15/22  2202      K 4.5   CL  103   CO2 24   *   BUN 11   CREATININE 1.1   CALCIUM 9.2   ANIONGAP 13       Significant Imaging: I have reviewed all pertinent imaging results/findings within the past 24 hours.

## 2022-08-16 NOTE — H&P
[facility]  History & Physical    Patient Name: Hany Devries  MRN: [id number]  Patient Class: IP- Inpatient  Admission Date: 8/15/2022  Attending Physician: PATRICIA Still MD   Primary Care Provider: Primary Doctor No         Patient information was obtained from patient, past medical records and ER records.     Subjective:     Principal Problem:Acute pulmonary embolism    Chief Complaint:   Chief Complaint   Patient presents with    Shortness of Breath     SOB since 4pm this evening and becomes extreme when ambulating.  Pt has been dx with blood clots since 2015 and was told by urgent care to go into ED to rule out PE.        HPI: The patient is a 42 y.o. 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  Patient states that his symptoms started this afternoon.  He was moving boxes into a storage unit and felt sudden onset of shortness of breath when he bent down to  a box.  He was able to complete moving all the boxes into a storage unit without any difficulty, and he states that his dyspnea improved with sitting in his air condition car and using his inhaler once.  He reports feeling a 2nd episode of dyspnea while at home that resolved quicker than the 1st also after using his inhaler.  Given his history, he did present to Urgent Care for evaluation and was directed to the emergency department for a PE rule out.  He denies any previous PE.  He was last diagnosed with a DVT approximately 6 months ago.  He has been off of his Eliquis secondary to his new insurance needing pre approval.  He denies any recent illness including fevers, chills, cough.  He also denies any PND or orthopnea.  He does report recent increased stress.  He currently denies any dyspnea, dizziness, or lightheadedness.  On CTA, the patient is found to have  bilateral pulmonary embolisms.      Past Medical History:   Diagnosis Date    Diabetes mellitus, type 2     DVT (deep venous thrombosis)        Past Surgical History:   Procedure Laterality Date    ANKLE SURGERY      HERNIA REPAIR         Review of patient's allergies indicates:  No Known Allergies    No current facility-administered medications on file prior to encounter.     Current Outpatient Medications on File Prior to Encounter   Medication Sig    albuterol (PROVENTIL/VENTOLIN HFA) 90 mcg/actuation inhaler Take 1-2 puffs by mouth every 4 (four) hours as needed for Shortness of Breath or Wheezing.    ELIQUIS 5 mg Tab Take 5 mg by mouth 2 (two) times daily.    metFORMIN (GLUCOPHAGE) 500 MG tablet Take 500 mg by mouth once daily.     Family History       Family history is unknown by patient.          Tobacco Use    Smoking status: Never Smoker    Smokeless tobacco: Never Used   Substance and Sexual Activity    Alcohol use: Not on file    Drug use: Never    Sexual activity: Yes     Partners: Female     Review of Systems   Constitutional:  Positive for activity change and fatigue. Negative for appetite change and fever.   HENT:  Negative for congestion, ear pain and postnasal drip.    Eyes:  Negative for discharge.   Respiratory:  Positive for chest tightness and shortness of breath. Negative for apnea and wheezing.    Cardiovascular:  Positive for chest pain. Negative for leg swelling.   Gastrointestinal:  Negative for abdominal distention, abdominal pain, nausea and vomiting.   Endocrine: Negative for polydipsia, polyphagia and polyuria.   Genitourinary:  Negative for difficulty urinating, flank pain, frequency, hematuria and urgency.   Musculoskeletal:  Negative for arthralgias and joint swelling.   Skin:  Negative for pallor and rash.   Allergic/Immunologic: Negative for environmental allergies and food allergies.   Neurological:  Negative for dizziness, speech difficulty, weakness, light-headedness  and headaches.   Hematological:  Does not bruise/bleed easily.   Psychiatric/Behavioral:  Negative for agitation.    Objective:     Vital Signs (Most Recent):  Temp: 98.7 °F (37.1 °C) (08/16/22 0202)  Pulse: 96 (08/16/22 0202)  Resp: 17 (08/16/22 0202)  BP: (!) 142/104 (08/16/22 0202)  SpO2: 97 % (08/16/22 0215)   Vital Signs (24h Range):  Temp:  [98.1 °F (36.7 °C)-98.7 °F (37.1 °C)] 98.7 °F (37.1 °C)  Pulse:  [] 96  Resp:  [16-28] 17  SpO2:  [91 %-98 %] 97 %  BP: (142-215)/() 142/104     Weight: (!) 166.5 kg (367 lb)  Body mass index is 51.19 kg/m².    Physical Exam  Constitutional:       Appearance: Normal appearance. He is well-developed.   HENT:      Head: Normocephalic.   Eyes:      Conjunctiva/sclera: Conjunctivae normal.   Cardiovascular:      Rate and Rhythm: Regular rhythm. Tachycardia present.      Pulses:           Radial pulses are 2+ on the right side and 2+ on the left side.      Heart sounds: Normal heart sounds.   Pulmonary:      Effort: Pulmonary effort is normal.      Breath sounds: Normal breath sounds.   Abdominal:      General: Bowel sounds are normal. There is no distension.      Palpations: Abdomen is soft.      Tenderness: There is no abdominal tenderness.   Musculoskeletal:         General: Normal range of motion.      Cervical back: Normal range of motion and neck supple.   Skin:     General: Skin is warm and dry.   Neurological:      Mental Status: He is alert and oriented to person, place, and time.      GCS: GCS eye subscore is 4. GCS verbal subscore is 5. GCS motor subscore is 6.      Motor: Motor function is intact.   Psychiatric:         Mood and Affect: Mood normal.         Speech: Speech normal.         Behavior: Behavior normal.           Significant Labs: All pertinent labs within the past 24 hours have been reviewed.  CBC:   Recent Labs   Lab 08/15/22  2202 08/16/22  0101   WBC 10.97 10.77   HGB 13.9* 14.2   HCT 43.2 42.8    SEE COMMENT     CMP:   Recent Labs    Lab 08/15/22  2202      K 4.5      CO2 24   *   BUN 11   CREATININE 1.1   CALCIUM 9.2   ANIONGAP 13       Significant Imaging: I have reviewed all pertinent imaging results/findings within the past 24 hours.    Assessment/Plan:     * Acute pulmonary embolism  CTA- Bilateral pulmonary emboli including distal main and segmental pulmonary arteries on the right and segmental pulmonary arteries on the left.  Follow-up recommended.    Heparin drip  Echo        Elevated troponin  Troponin-1.405, mild chest pain.    Trend Troponin  Echo        VTE Risk Mitigation (From admission, onward)         Ordered     IP VTE HIGH RISK PATIENT  Once         08/16/22 0336     Place sequential compression device  Until discontinued         08/16/22 0336     heparin 25,000 units in dextrose 5% (100 units/ml) IV bolus from bag - ADDITIONAL PRN BOLUS - 60 units/kg  As needed (PRN)        Question:  Heparin Infusion Adjustment (DO NOT MODIFY ANSWER)  Answer:  \\[email or web address]\epic\Images\Pharmacy\HeparinInfusions\heparin HIGH INTENSITY nomogram for [facility] [id number]    08/16/22 0047     heparin 25,000 units in dextrose 5% (100 units/ml) IV bolus from bag - ADDITIONAL PRN BOLUS - 30 units/kg  As needed (PRN)        Question:  Heparin Infusion Adjustment (DO NOT MODIFY ANSWER)  Answer:  \\[email or web address]\epic\Images\Pharmacy\HeparinInfusions\heparin HIGH INTENSITY nomogram for [facility] [id number]    08/16/22 0047     heparin 25,000 units in dextrose 5% 250 mL (100 units/mL) infusion HIGH INTENSITY nomogram - [facility]  Continuous        Question Answer Comment   Heparin Infusion Adjustment (DO NOT MODIFY ANSWER) [email or web address]\epic\Images\Pharmacy\HeparinInfusions\heparin HIGH INTENSITY nomogram for [facility] [id number]    Begin at (in units/kg/hr) 18        08/16/22 0047                   Dashawn Rose NP  Department of Hospital Medicine   [facility] ([place])

## 2022-08-16 NOTE — PLAN OF CARE
08/16/22 0952   Physical Activity   On average, how many days per week do you engage in moderate to strenuous exercise (like a brisk walk)? 0 days   On average, how many minutes do you engage in exercise at this level? 0 min   Financial Resource Strain   How hard is it for you to pay for the very basics like food, housing, medical care, and heating? Not hard   Housing Stability   In the last 12 months, was there a time when you were not able to pay the mortgage or rent on time? N   In the last 12 months, was there a time when you did not have a steady place to sleep or slept in a shelter (including now)? N   Transportation Needs   In the past 12 months, has lack of transportation kept you from medical appointments or from getting medications? no   In the past 12 months, has lack of transportation kept you from meetings, work, or from getting things needed for daily living? No   Food Insecurity   Within the past 12 months, you worried that your food would run out before you got the money to buy more. Never true   Within the past 12 months, the food you bought just didn't last and you didn't have money to get more. Never true   Stress   Do you feel stress - tense, restless, nervous, or anxious, or unable to sleep at night because your mind is troubled all the time - these days? Very much   Social Connections   In a typical week, how many times do you talk on the phone with family, friends, or neighbors? Three   How often do you get together with friends or relatives? More than 3   How often do you attend Quaker or Hoahaoism services? More than 4   Do you belong to any clubs or organizations such as Quaker groups, unions, fraternal or athletic groups, or school groups? Yes   How often do you attend meetings of the clubs or organizations you belong to? More than 4   Are you , , , , never , or living with a partner?    Alcohol Use   Q1: How often do you have a drink  containing alcohol? 2-4 pr month   Q2: How many drinks containing alcohol do you have on a typical day when you are drinking? 1 or 2   Q3: How often do you have six or more drinks on one occasion? Never

## 2022-08-16 NOTE — CONSULTS
[facility]    Date of admission:  8/15/2022     Reason for Consult:    Elevated troponin    Chief Complaint    Chief Complaint   Patient presents with    Shortness of Breath     SOB since 4pm this evening and becomes extreme when ambulating.  Pt has been dx with blood clots since 2015 and was told by urgent care to go into ED to rule out PE.       HPI:    This 42-year-old male with a history of chronic deep vein thrombosis has been off anticoagulation for couple of weeks.  He presented complaining of exertional dyspnea.  Symptoms started yesterday afternoon.  He had improvement after rest and using his inhaler.  But given his history, he went to urgent care.  He was then referred to the emergency department where a CT angio showed bilateral pulmonary emboli.  Since admission, he is feeling better.  As part of his initial workup, troponin was performed which is elevated.  It has trended upwards.  He denies prior cardiac problems or workup.    Medications  Current Facility-Administered Medications   Medication Dose Route Frequency Provider Last Rate Last Admin    acetaminophen tablet 650 mg  650 mg Oral Q4H PRN Dashawn Rose NP        enoxaparin injection 170 mg  1 mg/kg Subcutaneous Q12H PATRICIA Still MD   170 mg at 08/16/22 0845    glucagon (human recombinant) injection 1 mg  1 mg Intramuscular PRN Dashawn Rose NP        glucose chewable tablet 16 g  16 g Oral PRN Dashawn Rose NP        glucose chewable tablet 24 g  24 g Oral PRN Dashawn Rose NP        insulin aspart U-100 pen 0-5 Units  0-5 Units Subcutaneous QID (AC + HS) PRN Dashawn Rose NP        naloxone 0.4 mg/mL injection 0.02 mg  0.02 mg Intravenous PRN Dashawn Rose NP        ondansetron injection 4 mg  4 mg Intravenous Q8H PRN Dashawn Rose NP        sodium chloride 0.9% flush 10 mL  10 mL Intravenous Q8H PRN Dashawn Rose NP          Prior to Admission medications     Medication Sig Start Date End Date Taking? Authorizing Provider   albuterol (PROVENTIL/VENTOLIN HFA) 90 mcg/actuation inhaler Take 1-2 puffs by mouth every 4 (four) hours as needed for Shortness of Breath or Wheezing. 9/9/21  Yes Historical Provider   ELIQUIS 5 mg Tab Take 5 mg by mouth 2 (two) times daily. 6/12/22  Yes Historical Provider   metFORMIN (GLUCOPHAGE) 500 MG tablet Take 500 mg by mouth once daily. 9/10/21  Yes Historical Provider       History  Past Medical History:   Diagnosis Date    Diabetes mellitus, type 2     DVT (deep venous thrombosis)      Past Surgical History:   Procedure Laterality Date    ANKLE SURGERY      HERNIA REPAIR       Social History     Socioeconomic History    Marital status:    Tobacco Use    Smoking status: Never Smoker    Smokeless tobacco: Never Used   Substance and Sexual Activity    Drug use: Never    Sexual activity: Yes     Partners: Female     Social Determinants of Health     Financial Resource Strain: Low Risk     Difficulty of Paying Living Expenses: Not hard at all   Food Insecurity: No Food Insecurity    Worried About Running Out of Food in the Last Year: Never true    Ran Out of Food in the Last Year: Never true   Transportation Needs: No Transportation Needs    Lack of Transportation (Medical): No    Lack of Transportation (Non-Medical): No   Physical Activity: Inactive    Days of Exercise per Week: 0 days    Minutes of Exercise per Session: 0 min   Stress: Stress Concern Present    Feeling of Stress : Very much   Social Connections: Socially Integrated    Frequency of Communication with Friends and Family: Three times a week    Frequency of Social Gatherings with Friends and Family: More than three times a week    Attends Taoist Services: More than 4 times per year    Active Member of Clubs or Organizations: Yes    Attends Club or Organization Meetings: More than 4 times per year    Marital Status:    Housing Stability:  Unknown    Unable to Pay for Housing in the Last Year: No    Unstable Housing in the Last Year: No     Family History   Family history unknown: Yes        Allergies  Review of patient's allergies indicates:  No Known Allergies    Review of Systems   Review of Systems   Constitutional: Positive for malaise/fatigue. Negative for weight gain and weight loss.   Eyes: Negative for visual disturbance.   Cardiovascular: Positive for chest pain and dyspnea on exertion. Negative for claudication, cyanosis, irregular heartbeat, leg swelling, near-syncope, orthopnea, palpitations, paroxysmal nocturnal dyspnea and syncope.   Respiratory: Positive for shortness of breath. Negative for cough, hemoptysis, sleep disturbances due to breathing and wheezing.    Hematologic/Lymphatic: Negative for bleeding problem. Does not bruise/bleed easily.   Skin: Negative for poor wound healing.   Musculoskeletal: Negative for muscle cramps and myalgias.   Gastrointestinal: Negative for abdominal pain, anorexia, diarrhea, heartburn, hematemesis, hematochezia, melena, nausea and vomiting.   Genitourinary: Negative for hematuria and nocturia.   Neurological: Negative for excessive daytime sleepiness, dizziness, focal weakness, light-headedness and weakness.       Physical Exam    Temp:  [98.1 °F (36.7 °C)-98.8 °F (37.1 °C)]   Pulse:  [91-96]   Resp:  [16-18]   BP: (127-151)/(72-85)   SpO2:  [97 %-98 %]    Wt Readings from Last 1 Encounters:   08/16/22 (!) 166.5 kg (367 lb)     Physical Exam  Vitals and nursing note reviewed.   Constitutional:       General: He is not in acute distress.     Appearance: He is obese. He is not toxic-appearing or diaphoretic.   HENT:      Head: Normocephalic and atraumatic.      Mouth/Throat:      Lips: Pink.      Mouth: Mucous membranes are moist.   Eyes:      General: No scleral icterus.     Conjunctiva/sclera: Conjunctivae normal.   Neck:      Thyroid: No thyromegaly.      Vascular: No carotid bruit, hepatojugular  reflux or JVD.      Trachea: Trachea normal.   Cardiovascular:      Rate and Rhythm: Normal rate and regular rhythm.  No extrasystoles are present.     Pulses:           Carotid pulses are 2+ on the right side and 2+ on the left side.       Radial pulses are 2+ on the right side and 2+ on the left side.      Heart sounds: S1 normal and S2 normal. No murmur heard.    No friction rub. No S3 or S4 sounds.   Pulmonary:      Effort: Pulmonary effort is normal. No tachypnea, bradypnea, accessory muscle usage or respiratory distress.      Breath sounds: Normal breath sounds and air entry. No decreased breath sounds, wheezing, rhonchi or rales.   Abdominal:      General: Bowel sounds are normal. There is no distension or abdominal bruit.      Palpations: Abdomen is soft. There is no hepatomegaly, splenomegaly or pulsatile mass.      Tenderness: There is no abdominal tenderness.   Musculoskeletal:         General: No tenderness or deformity.      Right lower leg: No edema.      Left lower leg: No edema.   Skin:     General: Skin is warm and dry.      Capillary Refill: Capillary refill takes less than 2 seconds.      Coloration: Skin is not cyanotic or pale.      Nails: There is no clubbing.   Neurological:      General: No focal deficit present.      Mental Status: He is alert and oriented to person, place, and time.   Psychiatric:         Attention and Perception: Attention normal.         Mood and Affect: Mood normal.         Speech: Speech normal.         Behavior: Behavior normal. Behavior is cooperative.           EKG  Sinus tachycardia, otherwise normal.  No prior studies available for comparison    Echocardiogram  · The left ventricle is normal in size with concentric hypertrophy and normal systolic function.  · There is abnormal septal wall motion. There is systolic flattening of the interventricular septum consistent with right ventricle pressure overload.  · Moderate right ventricular enlargement.  · Moderate right  atrial enlargement.  · Interatrial septum bows to left, consider elevated right atrial pressure.  · Mild to moderate tricuspid regurgitation.  · There is mild to moderate pulmonary hypertension.    Labs  Recent Results (from the past 72 hour(s))   POCT COVID-19 Rapid Screening    Collection Time: 08/15/22  7:31 PM   Result Value Ref Range    POC Rapid COVID Negative Negative     Acceptable Yes    HIV 1/2 Ag/Ab (4th Gen)    Collection Time: 08/15/22 10:02 PM   Result Value Ref Range    HIV 1/2 Ag/Ab Negative Negative   Hepatitis C Antibody    Collection Time: 08/15/22 10:02 PM   Result Value Ref Range    Hepatitis C Ab Negative Negative   CBC Auto Differential    Collection Time: 08/15/22 10:02 PM   Result Value Ref Range    WBC 10.97 3.90 - 12.70 K/uL    RBC 5.18 4.60 - 6.20 M/uL    Hemoglobin 13.9 (L) 14.0 - 18.0 g/dL    Hematocrit 43.2 40.0 - 54.0 %    MCV 83 82 - 98 fL    MCH 26.8 (L) 27.0 - 31.0 pg    MCHC 32.2 32.0 - 36.0 g/dL    RDW 13.3 11.5 - 14.5 %    Platelets 171 150 - 450 K/uL    MPV 11.1 9.2 - 12.9 fL    Immature Granulocytes 0.2 0.0 - 0.5 %    Gran # (ANC) 9.0 (H) 1.8 - 7.7 K/uL    Immature Grans (Abs) 0.02 0.00 - 0.04 K/uL    Lymph # 1.2 1.0 - 4.8 K/uL    Mono # 0.7 0.3 - 1.0 K/uL    Eos # 0.0 0.0 - 0.5 K/uL    Baso # 0.02 0.00 - 0.20 K/uL    nRBC 0 0 /100 WBC    Gran % 81.5 (H) 38.0 - 73.0 %    Lymph % 11.3 (L) 18.0 - 48.0 %    Mono % 6.6 4.0 - 15.0 %    Eosinophil % 0.2 0.0 - 8.0 %    Basophil % 0.2 0.0 - 1.9 %    Differential Method Automated    Basic Metabolic Panel    Collection Time: 08/15/22 10:02 PM   Result Value Ref Range    Sodium 140 136 - 145 mmol/L    Potassium 4.5 3.5 - 5.1 mmol/L    Chloride 103 95 - 110 mmol/L    CO2 24 23 - 29 mmol/L    Glucose 189 (H) 70 - 110 mg/dL    BUN 11 6 - 20 mg/dL    Creatinine 1.1 0.5 - 1.4 mg/dL    Calcium 9.2 8.7 - 10.5 mg/dL    Anion Gap 13 8 - 16 mmol/L    eGFR >60 >60 mL/min/1.73 m^2   Brain natriuretic peptide    Collection Time:  08/15/22 10:02 PM   Result Value Ref Range    BNP 12 0 - 99 pg/mL   Troponin I    Collection Time: 08/15/22 10:02 PM   Result Value Ref Range    Troponin I 1.405 (H) 0.000 - 0.026 ng/mL   ISTAT CREATININE    Collection Time: 08/15/22 10:07 PM   Result Value Ref Range    POC Creatinine 1.0 0.5 - 1.4 mg/dL    Sample VENOUS    CBC auto differential    Collection Time: 08/16/22  1:01 AM   Result Value Ref Range    WBC 10.77 3.90 - 12.70 K/uL    RBC 5.27 4.60 - 6.20 M/uL    Hemoglobin 14.2 14.0 - 18.0 g/dL    Hematocrit 42.8 40.0 - 54.0 %    MCV 81 (L) 82 - 98 fL    MCH 26.9 (L) 27.0 - 31.0 pg    MCHC 33.2 32.0 - 36.0 g/dL    RDW 13.3 11.5 - 14.5 %    Platelets SEE COMMENT 150 - 450 K/uL    MPV 11.1 9.2 - 12.9 fL    Immature Granulocytes 0.3 0.0 - 0.5 %    Gran # (ANC) 7.7 1.8 - 7.7 K/uL    Immature Grans (Abs) 0.03 0.00 - 0.04 K/uL    Lymph # 2.2 1.0 - 4.8 K/uL    Mono # 0.8 0.3 - 1.0 K/uL    Eos # 0.1 0.0 - 0.5 K/uL    Baso # 0.01 0.00 - 0.20 K/uL    nRBC 0 0 /100 WBC    Gran % 71.5 38.0 - 73.0 %    Lymph % 20.4 18.0 - 48.0 %    Mono % 7.1 4.0 - 15.0 %    Eosinophil % 0.6 0.0 - 8.0 %    Basophil % 0.1 0.0 - 1.9 %    Platelet Estimate Clumped (A)     Differential Method Automated    APTT    Collection Time: 08/16/22  6:01 AM   Result Value Ref Range    aPTT 65.2 (H) 21.0 - 32.0 sec   CBC auto differential    Collection Time: 08/16/22  6:01 AM   Result Value Ref Range    WBC 10.60 3.90 - 12.70 K/uL    RBC 5.19 4.60 - 6.20 M/uL    Hemoglobin 13.8 (L) 14.0 - 18.0 g/dL    Hematocrit 42.6 40.0 - 54.0 %    MCV 82 82 - 98 fL    MCH 26.6 (L) 27.0 - 31.0 pg    MCHC 32.4 32.0 - 36.0 g/dL    RDW 13.3 11.5 - 14.5 %    Platelets 169 150 - 450 K/uL    MPV 11.2 9.2 - 12.9 fL    Immature Granulocytes 0.4 0.0 - 0.5 %    Gran # (ANC) 6.9 1.8 - 7.7 K/uL    Immature Grans (Abs) 0.04 0.00 - 0.04 K/uL    Lymph # 2.7 1.0 - 4.8 K/uL    Mono # 0.8 0.3 - 1.0 K/uL    Eos # 0.2 0.0 - 0.5 K/uL    Baso # 0.02 0.00 - 0.20 K/uL    nRBC 0 0 /100 WBC     Gran % 64.9 38.0 - 73.0 %    Lymph % 25.4 18.0 - 48.0 %    Mono % 7.7 4.0 - 15.0 %    Eosinophil % 1.4 0.0 - 8.0 %    Basophil % 0.2 0.0 - 1.9 %    Differential Method Automated    Comprehensive Metabolic Panel (CMP)    Collection Time: 08/16/22  6:01 AM   Result Value Ref Range    Sodium 140 136 - 145 mmol/L    Potassium 4.2 3.5 - 5.1 mmol/L    Chloride 104 95 - 110 mmol/L    CO2 24 23 - 29 mmol/L    Glucose 166 (H) 70 - 110 mg/dL    BUN 8 6 - 20 mg/dL    Creatinine 1.1 0.5 - 1.4 mg/dL    Calcium 9.2 8.7 - 10.5 mg/dL    Total Protein 7.3 6.0 - 8.4 g/dL    Albumin 3.7 3.5 - 5.2 g/dL    Total Bilirubin 0.8 0.1 - 1.0 mg/dL    Alkaline Phosphatase 54 (L) 55 - 135 U/L    AST 32 10 - 40 U/L    ALT 39 10 - 44 U/L    Anion Gap 12 8 - 16 mmol/L    eGFR >60 >60 mL/min/1.73 m^2   Magnesium    Collection Time: 08/16/22  6:01 AM   Result Value Ref Range    Magnesium 1.8 1.6 - 2.6 mg/dL   Phosphorus    Collection Time: 08/16/22  6:01 AM   Result Value Ref Range    Phosphorus 4.1 2.7 - 4.5 mg/dL   Troponin I    Collection Time: 08/16/22  6:01 AM   Result Value Ref Range    Troponin I 2.098 (H) 0.000 - 0.026 ng/mL   Hemoglobin A1c    Collection Time: 08/16/22  6:01 AM   Result Value Ref Range    Hemoglobin A1C 7.5 (H) 4.0 - 5.6 %    Estimated Avg Glucose 169 (H) 68 - 131 mg/dL   CBC with Automated Differential    Collection Time: 08/16/22  6:01 AM   Result Value Ref Range    WBC 10.60 3.90 - 12.70 K/uL    RBC 5.19 4.60 - 6.20 M/uL    Hemoglobin 13.8 (L) 14.0 - 18.0 g/dL    Hematocrit 42.6 40.0 - 54.0 %    MCV 82 82 - 98 fL    MCH 26.6 (L) 27.0 - 31.0 pg    MCHC 32.4 32.0 - 36.0 g/dL    RDW 13.3 11.5 - 14.5 %    Platelets 169 150 - 450 K/uL    MPV 11.2 9.2 - 12.9 fL    Immature Granulocytes 0.4 0.0 - 0.5 %    Gran # (ANC) 6.9 1.8 - 7.7 K/uL    Immature Grans (Abs) 0.04 0.00 - 0.04 K/uL    Lymph # 2.7 1.0 - 4.8 K/uL    Mono # 0.8 0.3 - 1.0 K/uL    Eos # 0.2 0.0 - 0.5 K/uL    Baso # 0.02 0.00 - 0.20 K/uL    nRBC 0 0 /100 WBC    Gran  % 64.9 38.0 - 73.0 %    Lymph % 25.4 18.0 - 48.0 %    Mono % 7.7 4.0 - 15.0 %    Eosinophil % 1.4 0.0 - 8.0 %    Basophil % 0.2 0.0 - 1.9 %    Differential Method Automated    POCT glucose    Collection Time: 08/16/22  7:35 AM   Result Value Ref Range    POCT Glucose 167 (H) 70 - 110 mg/dL   POCT glucose    Collection Time: 08/16/22 11:33 AM   Result Value Ref Range    POCT Glucose 192 (H) 70 - 110 mg/dL   Troponin I    Collection Time: 08/16/22 11:38 AM   Result Value Ref Range    Troponin I 1.332 (H) 0.000 - 0.026 ng/mL   Echo    Collection Time: 08/16/22  1:16 PM   Result Value Ref Range    BSA 2.89 m2    TDI SEPTAL 0.08 m/s    LV LATERAL E/E' RATIO 9.88 m/s    LV SEPTAL E/E' RATIO 9.88 m/s    LA WIDTH 3.25 cm    TDI LATERAL 0.08 m/s    PV PEAK VELOCITY 0.95 cm/s    LVIDd 3.71 3.5 - 6.0 cm    IVS 1.69 (A) 0.6 - 1.1 cm    Posterior Wall 1.35 (A) 0.6 - 1.1 cm    LVIDs 2.32 2.1 - 4.0 cm    FS 37 28 - 44 %    LA volume 44.77 cm3    Sinus 3.22 cm    LV mass 213.99 g    LA size 3.31 cm    Left Ventricle Relative Wall Thickness 0.73 cm    AV mean gradient 2 mmHg    AV valve area 3.16 cm2    AV Velocity Ratio 0.74     AV index (prosthetic) 0.71     MV valve area p 1/2 method 8.24 cm2    Mean e' 0.08 m/s    E wave deceleration time 92.03 msec    IVRT 60.89 msec    Pulm vein S/D ratio 1.43     LVOT diameter 2.39 cm    LVOT area 4.5 cm2    LVOT peak benjy 0.67 m/s    LVOT peak VTI 10.56 cm    Ao peak benjy 0.91 m/s    Ao VTI 14.97 cm    LVOT stroke volume 47.35 cm3    AV peak gradient 3 mmHg    E/E' ratio 9.88 m/s    MV Peak E Benjy 0.79 m/s    TR Max Benjy 2.90 m/s    MV stenosis pressure 1/2 time 26.69 ms    PV Peak S Benjy 0.40 m/s    PV Peak D Benjy 0.28 m/s    LV Systolic Volume 18.60 mL    LV Systolic Volume Index 6.8 mL/m2    LV Diastolic Volume 58.33 mL    LV Diastolic Volume Index 21.37 mL/m2    LA Volume Index 16.4 mL/m2    LV Mass Index 78 g/m2    RA Major Axis 4.88 cm    Left Atrium Minor Axis 4.61 cm    Left Atrium Major  Axis 5.22 cm    Triscuspid Valve Regurgitation Peak Gradient 34 mmHg    LA Volume Index (Mod) 21.6 mL/m2    LA volume (mod) 59.00 cm3    RA Width 3.68 cm    EF 65 %   POCT glucose    Collection Time: 08/16/22  4:18 PM   Result Value Ref Range    POCT Glucose 139 (H) 70 - 110 mg/dL        Imaging  X-Ray Chest PA And Lateral    Result Date: 8/15/2022  EXAMINATION: XR CHEST PA AND LATERAL CLINICAL HISTORY: Shortness of breath TECHNIQUE: PA and lateral views of the chest were performed. COMPARISON: None FINDINGS: The lungs are well expanded and clear. No focal opacities are seen. The pleural spaces are clear. The cardiac silhouette is unremarkable. The visualized osseous structures are unremarkable.     No acute abnormality. Electronically signed by: Alex Espana Date:    08/15/2022 Time:    19:23    CTA Chest Non-Coronary (PE Study)    Result Date: 8/16/2022  EXAMINATION: CTA CHEST NON CORONARY CLINICAL HISTORY: Pulmonary embolism (PE) suspected, unknown D-dimer; TECHNIQUE: Low dose axial images, sagittal and coronal reformations were obtained from the thoracic inlet to the lung bases following the IV administration of 100 mL of Omnipaque 350.  Contrast timing was optimized to evaluate the pulmonary arteries.  MIP images were performed. COMPARISON: None FINDINGS: Pulmonary arteries:Pulmonary arteries are suboptimally enhanced.There are filling defects in the distal right main and proximal segmental pulmonary arteries in upper and lower lobes on the right.  Left lower and left upper lobe segmental pulmonary emboli on the left also.  Follow-up recommended. Thoracic soft tissues: Unremarkable. Aorta: Left-sided aortic arch.  No aneurysm or dissection. Heart: Normal size. No effusion. Tania/Mediastinum: No pathologic isis enlargement. Airways: Patent. Lungs/Pleura: Clear lungs. No pleural effusion or thickening. Esophagus: Unremarkable. Upper Abdomen: No abnormality of the partially imaged upper abdomen. Bones: No acute  fracture. No suspicious lytic or sclerotic lesions.     1. Bilateral pulmonary emboli including distal main and segmental pulmonary arteries on the right and segmental pulmonary arteries on the left.  Follow-up recommended. 2.  This report was flagged in Epic as abnormal. Electronically signed by: Frank Dixon Date:    08/16/2022 Time:    00:08    Echo    Result Date: 8/16/2022  · The left ventricle is normal in size with concentric hypertrophy and normal systolic function. · There is abnormal septal wall motion. There is systolic flattening of the interventricular septum consistent with right ventricle pressure overload. · Moderate right ventricular enlargement. · Moderate right atrial enlargement. · Interatrial septum bows to left, consider elevated right atrial pressure. · Mild to moderate tricuspid regurgitation. · There is mild to moderate pulmonary hypertension.        Assessment    Acute pulmonary embolism with cor pulmonale  Symptomatically improved      Elevated troponin   Almost certainly related to right ventricular strain.    Plan and Discussion    I believe his troponin elevation can almost certainly be explained by right ventricular strain as result of his pulmonary embolism.  Agree with treatment which is underway.  Will check a follow-up electrocardiogram to look for any ischemic changes.  Given his multiple risk factors, I think it would be reasonable to do a stress test once he has stabilized from the standpoint of his pulmonary embolism (as an outpatient.)  Needs aggressive risk factor modification      Rm Yao MD

## 2022-08-16 NOTE — HPI
The patient is a 42 y.o. 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  Patient states that his symptoms started this afternoon.  He was moving boxes into a storage unit and felt sudden onset of shortness of breath when he bent down to  a box.  He was able to complete moving all the boxes into a storage unit without any difficulty, and he states that his dyspnea improved with sitting in his air condition car and using his inhaler once.  He reports feeling a 2nd episode of dyspnea while at home that resolved quicker than the 1st also after using his inhaler.  Given his history, he did present to Urgent Care for evaluation and was directed to the emergency department for a PE rule out.  He denies any previous PE.  He was last diagnosed with a DVT approximately 6 months ago.  He has been off of his Eliquis secondary to his new insurance needing pre approval.  He denies any recent illness including fevers, chills, cough.  He also denies any PND or orthopnea.  He does report recent increased stress.  He currently denies any dyspnea, dizziness, or lightheadedness.  On CTA, the patient is found to have bilateral pulmonary embolisms.

## 2022-08-16 NOTE — ED TRIAGE NOTES
Patient reports increasing SOB with activity since yesterday, Hx of DVT and currently off Eliquis due to insurance issues. Seen at urgent care facility today and referred to ED for further workup

## 2022-08-16 NOTE — ASSESSMENT & PLAN NOTE
CTA- Bilateral pulmonary emboli including distal main and segmental pulmonary arteries on the right and segmental pulmonary arteries on the left.  Follow-up recommended.    Heparin drip  Echo

## 2022-08-16 NOTE — PLAN OF CARE
Initial Discharge Planning Assessment:  Patient admitted on: 8/16/22     Chart reviewed, Care plan discussed with treatment team,  attending      PCP updated in Epic:Needs PCP  Pharmacy, updated in Epic: [facility]     DME at home: None     Current dispo: Home      Transportation:Wife will provide transportation home     Power of  or Living Will:None     Anticipated DC needs from CM perspective: PCP   08/16/22 0947   Discharge Assessment   Assessment Type Discharge Planning Assessment   Confirmed/corrected address, phone number and insurance Yes   Confirmed Demographics Correct on Facesheet   Source of Information patient   Lives With spouse   Do you expect to return to your current living situation? Yes   Do you have help at home or someone to help you manage your care at home? Yes   Prior to hospitilization cognitive status: Alert/Oriented   Current cognitive status: Alert/Oriented   Walking or Climbing Stairs Difficulty none   Dressing/Bathing Difficulty none   Equipment Currently Used at Home none   Readmission within 30 days? No   Patient currently being followed by outpatient case management? No   Do you currently have service(s) that help you manage your care at home? No   Do you take prescription medications? Yes   Do you have prescription coverage? Yes   Do you have any problems affording any of your prescribed medications? No   Is the patient taking medications as prescribed? yes   Who is going to help you get home at discharge? Wife Soheila Devries   How do you get to doctors appointments? car, drives self   Are you on dialysis? No   Do you take coumadin? No   Discharge Plan A Home with family   Discharge Plan discussed with: Patient   Discharge Barriers Identified None   [facility] - Med Surg ([facility])  Initial Discharge Assessment       Primary Care Provider: Primary Doctor No    Admission Diagnosis: Dyspnea [R06.00]    Admission Date: 8/15/2022  Expected Discharge Date:     Discharge Barriers  Identified: (P) None    Payor: BLUE CROSS BLUE SHIELD / Plan: BCBS ALL OUT OF STATE / Product Type: PPO /     No emergency contact information on file.    Discharge Plan A: (P) Home with family         [id number] - [facility] [place] - [address]  [facility] [address]  Phone: [phone] Fax: [phone]      Initial Assessment (most recent)       Adult Discharge Assessment - 08/16/22 0947          Discharge Assessment    Assessment Type Discharge Planning Assessment (P)      Confirmed/corrected address, phone number and insurance Yes (P)      Confirmed Demographics Correct on Facesheet (P)      Source of Information patient (P)      Lives With spouse (P)      Do you expect to return to your current living situation? Yes (P)      Do you have help at home or someone to help you manage your care at home? Yes (P)      Prior to hospitilization cognitive status: Alert/Oriented (P)      Current cognitive status: Alert/Oriented (P)      Walking or Climbing Stairs Difficulty none (P)      Dressing/Bathing Difficulty none (P)      Equipment Currently Used at Home none (P)      Readmission within 30 days? No (P)      Patient currently being followed by outpatient case management? No (P)      Do you currently have service(s) that help you manage your care at home? No (P)      Do you take prescription medications? Yes (P)      Do you have prescription coverage? Yes (P)      Do you have any problems affording any of your prescribed medications? No (P)      Is the patient taking medications as prescribed? yes (P)      Who is going to help you get home at discharge? Wife Soheila Devries (P)      How do you get to doctors appointments? car, drives self (P)      Are you on dialysis? No (P)      Do you take coumadin? No (P)      Discharge Plan A Home with family (P)      Discharge Plan discussed with: Patient (P)      Discharge Barriers Identified  None (P)

## 2022-08-17 LAB
ALBUMIN SERPL BCP-MCNC: 3.8 G/DL (ref 3.5–5.2)
ALP SERPL-CCNC: 55 U/L (ref 55–135)
ALT SERPL W/O P-5'-P-CCNC: 36 U/L (ref 10–44)
ANION GAP SERPL CALC-SCNC: 11 MMOL/L (ref 8–16)
AST SERPL-CCNC: 29 U/L (ref 10–40)
BASOPHILS # BLD AUTO: 0.02 K/UL (ref 0–0.2)
BASOPHILS NFR BLD: 0.2 % (ref 0–1.9)
BILIRUB SERPL-MCNC: 0.8 MG/DL (ref 0.1–1)
BUN SERPL-MCNC: 11 MG/DL (ref 6–20)
CALCIUM SERPL-MCNC: 9.4 MG/DL (ref 8.7–10.5)
CHLORIDE SERPL-SCNC: 105 MMOL/L (ref 95–110)
CO2 SERPL-SCNC: 24 MMOL/L (ref 23–29)
CREAT SERPL-MCNC: 1.2 MG/DL (ref 0.5–1.4)
DIFFERENTIAL METHOD: ABNORMAL
EOSINOPHIL # BLD AUTO: 0.2 K/UL (ref 0–0.5)
EOSINOPHIL NFR BLD: 2.5 % (ref 0–8)
ERYTHROCYTE [DISTWIDTH] IN BLOOD BY AUTOMATED COUNT: 13.3 % (ref 11.5–14.5)
EST. GFR  (NO RACE VARIABLE): >60 ML/MIN/1.73 M^2
GLUCOSE SERPL-MCNC: 151 MG/DL (ref 70–110)
HCT VFR BLD AUTO: 44.9 % (ref 40–54)
HGB BLD-MCNC: 14.4 G/DL (ref 14–18)
IMM GRANULOCYTES # BLD AUTO: 0.02 K/UL (ref 0–0.04)
IMM GRANULOCYTES NFR BLD AUTO: 0.2 % (ref 0–0.5)
LYMPHOCYTES # BLD AUTO: 2.6 K/UL (ref 1–4.8)
LYMPHOCYTES NFR BLD: 29.4 % (ref 18–48)
MAGNESIUM SERPL-MCNC: 1.9 MG/DL (ref 1.6–2.6)
MCH RBC QN AUTO: 26.2 PG (ref 27–31)
MCHC RBC AUTO-ENTMCNC: 32.1 G/DL (ref 32–36)
MCV RBC AUTO: 82 FL (ref 82–98)
MONOCYTES # BLD AUTO: 0.8 K/UL (ref 0.3–1)
MONOCYTES NFR BLD: 8.5 % (ref 4–15)
NEUTROPHILS # BLD AUTO: 5.3 K/UL (ref 1.8–7.7)
NEUTROPHILS NFR BLD: 59.2 % (ref 38–73)
NRBC BLD-RTO: 0 /100 WBC
PHOSPHATE SERPL-MCNC: 4.4 MG/DL (ref 2.7–4.5)
PLATELET # BLD AUTO: 168 K/UL (ref 150–450)
PMV BLD AUTO: 11.4 FL (ref 9.2–12.9)
POCT GLUCOSE: 116 MG/DL (ref 70–110)
POCT GLUCOSE: 123 MG/DL (ref 70–110)
POCT GLUCOSE: 144 MG/DL (ref 70–110)
POCT GLUCOSE: 146 MG/DL (ref 70–110)
POTASSIUM SERPL-SCNC: 4.7 MMOL/L (ref 3.5–5.1)
PROT SERPL-MCNC: 7.2 G/DL (ref 6–8.4)
RBC # BLD AUTO: 5.49 M/UL (ref 4.6–6.2)
SODIUM SERPL-SCNC: 140 MMOL/L (ref 136–145)
WBC # BLD AUTO: 8.94 K/UL (ref 3.9–12.7)

## 2022-08-17 PROCEDURE — 63600175 PHARM REV CODE 636 W HCPCS: Performed by: INTERNAL MEDICINE

## 2022-08-17 PROCEDURE — 80053 COMPREHEN METABOLIC PANEL: CPT | Performed by: NURSE PRACTITIONER

## 2022-08-17 PROCEDURE — 99233 SBSQ HOSP IP/OBS HIGH 50: CPT | Mod: ,,, | Performed by: INTERNAL MEDICINE

## 2022-08-17 PROCEDURE — 99233 PR SUBSEQUENT HOSPITAL CARE,LEVL III: ICD-10-PCS | Mod: ,,, | Performed by: INTERNAL MEDICINE

## 2022-08-17 PROCEDURE — 36415 COLL VENOUS BLD VENIPUNCTURE: CPT | Performed by: NURSE PRACTITIONER

## 2022-08-17 PROCEDURE — 83735 ASSAY OF MAGNESIUM: CPT | Performed by: NURSE PRACTITIONER

## 2022-08-17 PROCEDURE — 85025 COMPLETE CBC W/AUTO DIFF WBC: CPT | Performed by: NURSE PRACTITIONER

## 2022-08-17 PROCEDURE — 11000001 HC ACUTE MED/SURG PRIVATE ROOM

## 2022-08-17 PROCEDURE — 84100 ASSAY OF PHOSPHORUS: CPT | Performed by: NURSE PRACTITIONER

## 2022-08-17 RX ADMIN — ENOXAPARIN SODIUM 170 MG: 100 INJECTION SUBCUTANEOUS at 08:08

## 2022-08-17 RX ADMIN — ENOXAPARIN SODIUM 170 MG: 100 INJECTION SUBCUTANEOUS at 09:08

## 2022-08-17 NOTE — PROGRESS NOTES
[facility] Medicine  Progress Note    Patient Name: Hany Devries  MRN: [id number]  Patient Class: IP- Inpatient   Admission Date: 8/15/2022  Length of Stay: 1 days  Attending Physician: PATRICIA Still MD  Primary Care Provider: Primary Doctor No        Subjective:     Principal Problem:Bilateral pulmonary embolism        HPI:  The patient is a 42 y.o. 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  Patient states that his symptoms started this afternoon.  He was moving boxes into a storage unit and felt sudden onset of shortness of breath when he bent down to  a box.  He was able to complete moving all the boxes into a storage unit without any difficulty, and he states that his dyspnea improved with sitting in his air condition car and using his inhaler once.  He reports feeling a 2nd episode of dyspnea while at home that resolved quicker than the 1st also after using his inhaler.  Given his history, he did present to Urgent Care for evaluation and was directed to the emergency department for a PE rule out.  He denies any previous PE.  He was last diagnosed with a DVT approximately 6 months ago.  He has been off of his Eliquis secondary to his new insurance needing pre approval.  He denies any recent illness including fevers, chills, cough.  He also denies any PND or orthopnea.  He does report recent increased stress.  He currently denies any dyspnea, dizziness, or lightheadedness.  On CTA, the patient is found to have bilateral pulmonary embolisms.      Overview/Hospital Course:  No notes on file    Interval History: No acute events overnight. Feeling well; no further chest pain or SOB. Discussed echo findings / plan of care. No other concerns at this time.    Review of Systems   Constitutional:  Negative for chills and fever.    Respiratory:  Negative for cough and shortness of breath.    Cardiovascular:  Negative for chest pain and palpitations.   Gastrointestinal:  Negative for abdominal pain, nausea and vomiting.   Objective:     Vital Signs (Most Recent):  Temp: 98.3 °F (36.8 °C) (08/17/22 1134)  Pulse: 90 (08/17/22 1134)  Resp: 16 (08/17/22 1134)  BP: 114/73 (08/17/22 1134)  SpO2: 99 % (08/17/22 1134) Vital Signs (24h Range):  Temp:  [98.2 °F (36.8 °C)-98.8 °F (37.1 °C)] 98.3 °F (36.8 °C)  Pulse:  [78-95] 90  Resp:  [16-20] 16  SpO2:  [96 %-99 %] 99 %  BP: (114-151)/(72-97) 114/73     Weight: (!) 166.5 kg (367 lb)  Body mass index is 51.19 kg/m².    Intake/Output Summary (Last 24 hours) at 8/17/2022 1213  Last data filed at 8/17/2022 1000  Gross per 24 hour   Intake 400 ml   Output --   Net 400 ml      Physical Exam  Vitals and nursing note reviewed.   Constitutional:       General: He is not in acute distress.     Appearance: He is well-developed.   HENT:      Head: Normocephalic and atraumatic.   Eyes:      General:         Right eye: No discharge.         Left eye: No discharge.      Conjunctiva/sclera: Conjunctivae normal.   Cardiovascular:      Rate and Rhythm: Normal rate.      Pulses: Normal pulses.   Pulmonary:      Effort: Pulmonary effort is normal. No respiratory distress.   Abdominal:      Palpations: Abdomen is soft.      Tenderness: There is no abdominal tenderness.   Musculoskeletal:         General: Normal range of motion.      Right lower leg: No edema.      Left lower leg: No edema.   Skin:     General: Skin is warm and dry.   Neurological:      Mental Status: He is alert and oriented to person, place, and time.       Significant Labs:   CBC:  Recent Labs   Lab 08/16/22  0101 08/16/22  0601 08/17/22  0420   WBC 10.77 10.60  10.60 8.94   HGB 14.2 13.8*  13.8* 14.4   HCT 42.8 42.6  42.6 44.9   PLT SEE COMMENT 169  169 168   GRAN 71.5  7.7 64.9  64.9  6.9  6.9 59.2  5.3   LYMPH 20.4  2.2 25.4  25.4  2.7   2.7 29.4  2.6   MONO 7.1  0.8 7.7  7.7  0.8  0.8 8.5  0.8   EOS 0.1 0.2  0.2 0.2   BASO 0.01 0.02  0.02 0.02   BMP:  Recent Labs   Lab 08/15/22  2202 08/16/22  0601 08/17/22  0420    140 140   K 4.5 4.2 4.7    104 105   CO2 24 24 24   BUN 11 8 11   CREATININE 1.1 1.1 1.2   * 166* 151*   CALCIUM 9.2 9.2 9.4   MG  --  1.8 1.9   PHOS  --  4.1 4.4     Recent Labs   Lab 08/15/22  2202 08/16/22  0601 08/16/22  1138   TROPONINI 1.405* 2.098* 1.332*     Significant Imaging:   No new imaging this morning.        Assessment/Plan:      * Bilateral pulmonary embolism  h/o DVTs, Elevated troponin  - Echo 08/16 showed concentric hypertrophy, normal systolic function, interventricular septal flattening between ventricles and atria consistent with elevated RV/RA pressures.  - Continue enoxaparin 1mg/kg subQ q12hr.  - Likely convert to apixaban PO tomorrow if remaining stable.  - Troponin leak likely reflects demand secondary to PE; cardiology consulted, plan for outpatient stress test. Appreciate assistance.    Elevated troponin  - As above.    VTE Risk Mitigation (From admission, onward)         Ordered     enoxaparin injection 170 mg  Every 12 hours (non-standard times)         08/16/22 0645     IP VTE HIGH RISK PATIENT  Once         08/16/22 0336     Place sequential compression device  Until discontinued         08/16/22 0336                Discharge Planning   DIA:      Code Status: Full Code   Is the patient medically ready for discharge?:     Reason for patient still in hospital (select all that apply): Treatment  Discharge Plan A: Home with family                  D Guido Still MD  Department of Hospital Medicine   [facility])

## 2022-08-17 NOTE — SUBJECTIVE & OBJECTIVE
Interval History: No acute events overnight. Feeling well; no further chest pain or SOB. Discussed echo findings / plan of care. No other concerns at this time.    Review of Systems   Constitutional:  Negative for chills and fever.   Respiratory:  Negative for cough and shortness of breath.    Cardiovascular:  Negative for chest pain and palpitations.   Gastrointestinal:  Negative for abdominal pain, nausea and vomiting.   Objective:     Vital Signs (Most Recent):  Temp: 98.3 °F (36.8 °C) (08/17/22 1134)  Pulse: 90 (08/17/22 1134)  Resp: 16 (08/17/22 1134)  BP: 114/73 (08/17/22 1134)  SpO2: 99 % (08/17/22 1134) Vital Signs (24h Range):  Temp:  [98.2 °F (36.8 °C)-98.8 °F (37.1 °C)] 98.3 °F (36.8 °C)  Pulse:  [78-95] 90  Resp:  [16-20] 16  SpO2:  [96 %-99 %] 99 %  BP: (114-151)/(72-97) 114/73     Weight: (!) 166.5 kg (367 lb)  Body mass index is 51.19 kg/m².    Intake/Output Summary (Last 24 hours) at 8/17/2022 1213  Last data filed at 8/17/2022 1000  Gross per 24 hour   Intake 400 ml   Output --   Net 400 ml      Physical Exam  Vitals and nursing note reviewed.   Constitutional:       General: He is not in acute distress.     Appearance: He is well-developed.   HENT:      Head: Normocephalic and atraumatic.   Eyes:      General:         Right eye: No discharge.         Left eye: No discharge.      Conjunctiva/sclera: Conjunctivae normal.   Cardiovascular:      Rate and Rhythm: Normal rate.      Pulses: Normal pulses.   Pulmonary:      Effort: Pulmonary effort is normal. No respiratory distress.   Abdominal:      Palpations: Abdomen is soft.      Tenderness: There is no abdominal tenderness.   Musculoskeletal:         General: Normal range of motion.      Right lower leg: No edema.      Left lower leg: No edema.   Skin:     General: Skin is warm and dry.   Neurological:      Mental Status: He is alert and oriented to person, place, and time.       Significant Labs:   CBC:  Recent Labs   Lab 08/16/22  0101  08/16/22  0601 08/17/22  0420   WBC 10.77 10.60  10.60 8.94   HGB 14.2 13.8*  13.8* 14.4   HCT 42.8 42.6  42.6 44.9   PLT SEE COMMENT 169  169 168   GRAN 71.5  7.7 64.9  64.9  6.9  6.9 59.2  5.3   LYMPH 20.4  2.2 25.4  25.4  2.7  2.7 29.4  2.6   MONO 7.1  0.8 7.7  7.7  0.8  0.8 8.5  0.8   EOS 0.1 0.2  0.2 0.2   BASO 0.01 0.02  0.02 0.02   BMP:  Recent Labs   Lab 08/15/22  2202 08/16/22  0601 08/17/22  0420    140 140   K 4.5 4.2 4.7    104 105   CO2 24 24 24   BUN 11 8 11   CREATININE 1.1 1.1 1.2   * 166* 151*   CALCIUM 9.2 9.2 9.4   MG  --  1.8 1.9   PHOS  --  4.1 4.4     Recent Labs   Lab 08/15/22  2202 08/16/22  0601 08/16/22  1138   TROPONINI 1.405* 2.098* 1.332*     Significant Imaging:   No new imaging this morning.

## 2022-08-17 NOTE — ASSESSMENT & PLAN NOTE
h/o DVTs, Elevated troponin  - Echo 08/16 showed concentric hypertrophy, normal systolic function, interventricular septal flattening between ventricles and atria consistent with elevated RV/RA pressures.  - Continue enoxaparin 1mg/kg subQ q12hr.  - Likely convert to apixaban PO tomorrow if remaining stable.  - Troponin leak likely reflects demand secondary to PE; cardiology consulted, plan for outpatient stress test. Appreciate assistance.

## 2022-08-17 NOTE — PROGRESS NOTES
[facility]    Admission date:  8/15/2022     Assessment    Acute pulmonary embolism with cor pulmonale  Doing well with anticoagulation     Elevated troponin   Almost certainly related to right ventricular strain.    Plan and Discussion    Outpatient stress test once he has recovered from his pulmonary embolism.    Subjective    No problems overnight.  Ambulatory without difficulty.    Medications  Current Facility-Administered Medications   Medication Dose Route Frequency Provider Last Rate Last Admin    acetaminophen tablet 650 mg  650 mg Oral Q4H PRN Dashawn Rose NP        enoxaparin injection 170 mg  1 mg/kg Subcutaneous Q12H PATRICIA Still MD   170 mg at 08/17/22 0910    glucagon (human recombinant) injection 1 mg  1 mg Intramuscular PRN Dashawn Rose NP        glucose chewable tablet 16 g  16 g Oral PRN Dashawn Rose, NP        glucose chewable tablet 24 g  24 g Oral PRN Dashawn Rose NP        insulin aspart U-100 pen 0-5 Units  0-5 Units Subcutaneous QID (AC + HS) PRN Dashawn Rose NP        naloxone 0.4 mg/mL injection 0.02 mg  0.02 mg Intravenous PRN Dashawn Rose NP        ondansetron injection 4 mg  4 mg Intravenous Q8H PRN Dashawn Rose NP        sodium chloride 0.9% flush 10 mL  10 mL Intravenous Q8H PRN Dashawn Rose NP            Physical Exam    Temp:  [98.2 °F (36.8 °C)-98.8 °F (37.1 °C)]   Pulse:  [78-95]   Resp:  [16-20]   BP: (114-151)/(72-97)   SpO2:  [96 %-99 %]    Wt Readings from Last 3 Encounters:   08/16/22 (!) 166.5 kg (367 lb)   08/15/22 (!) 158.8 kg (350 lb)     Physical Exam  Constitutional:       General: He is not in acute distress.     Appearance: He is obese.   Neck:      Vascular: No hepatojugular reflux or JVD.   Cardiovascular:      Rate and Rhythm: Normal rate and regular rhythm.      Heart sounds: S1 normal and S2 normal. No murmur heard.    No S3 or S4 sounds.   Pulmonary:      Effort: Pulmonary effort  is normal.      Breath sounds: Normal breath sounds and air entry.   Abdominal:      General: Bowel sounds are normal.      Palpations: Abdomen is soft. There is no hepatomegaly.      Tenderness: There is no abdominal tenderness.   Musculoskeletal:      Right lower leg: No edema.      Left lower leg: No edema.   Skin:     Coloration: Skin is not pale.   Neurological:      Mental Status: He is alert.   Psychiatric:         Behavior: Behavior is cooperative.           Labs  Recent Results (from the past 24 hour(s))   Echo    Collection Time: 08/16/22  1:16 PM   Result Value Ref Range    BSA 2.89 m2    TDI SEPTAL 0.08 m/s    LV LATERAL E/E' RATIO 9.88 m/s    LV SEPTAL E/E' RATIO 9.88 m/s    LA WIDTH 3.25 cm    TDI LATERAL 0.08 m/s    PV PEAK VELOCITY 0.95 cm/s    LVIDd 3.71 3.5 - 6.0 cm    IVS 1.69 (A) 0.6 - 1.1 cm    Posterior Wall 1.35 (A) 0.6 - 1.1 cm    LVIDs 2.32 2.1 - 4.0 cm    FS 37 28 - 44 %    LA volume 44.77 cm3    Sinus 3.22 cm    LV mass 213.99 g    LA size 3.31 cm    Left Ventricle Relative Wall Thickness 0.73 cm    AV mean gradient 2 mmHg    AV valve area 3.16 cm2    AV Velocity Ratio 0.74     AV index (prosthetic) 0.71     MV valve area p 1/2 method 8.24 cm2    Mean e' 0.08 m/s    E wave deceleration time 92.03 msec    IVRT 60.89 msec    Pulm vein S/D ratio 1.43     LVOT diameter 2.39 cm    LVOT area 4.5 cm2    LVOT peak benjy 0.67 m/s    LVOT peak VTI 10.56 cm    Ao peak benjy 0.91 m/s    Ao VTI 14.97 cm    LVOT stroke volume 47.35 cm3    AV peak gradient 3 mmHg    E/E' ratio 9.88 m/s    MV Peak E Benjy 0.79 m/s    TR Max Benjy 2.90 m/s    MV stenosis pressure 1/2 time 26.69 ms    PV Peak S Benjy 0.40 m/s    PV Peak D Benjy 0.28 m/s    LV Systolic Volume 18.60 mL    LV Systolic Volume Index 6.8 mL/m2    LV Diastolic Volume 58.33 mL    LV Diastolic Volume Index 21.37 mL/m2    LA Volume Index 16.4 mL/m2    LV Mass Index 78 g/m2    RA Major Axis 4.88 cm    Left Atrium Minor Axis 4.61 cm    Left Atrium Major Axis 5.22  cm    Triscuspid Valve Regurgitation Peak Gradient 34 mmHg    LA Volume Index (Mod) 21.6 mL/m2    LA volume (mod) 59.00 cm3    RA Width 3.68 cm    EF 65 %   POCT glucose    Collection Time: 08/16/22  4:18 PM   Result Value Ref Range    POCT Glucose 139 (H) 70 - 110 mg/dL   POCT glucose    Collection Time: 08/16/22  8:43 PM   Result Value Ref Range    POCT Glucose 139 (H) 70 - 110 mg/dL   CBC auto differential    Collection Time: 08/17/22  4:20 AM   Result Value Ref Range    WBC 8.94 3.90 - 12.70 K/uL    RBC 5.49 4.60 - 6.20 M/uL    Hemoglobin 14.4 14.0 - 18.0 g/dL    Hematocrit 44.9 40.0 - 54.0 %    MCV 82 82 - 98 fL    MCH 26.2 (L) 27.0 - 31.0 pg    MCHC 32.1 32.0 - 36.0 g/dL    RDW 13.3 11.5 - 14.5 %    Platelets 168 150 - 450 K/uL    MPV 11.4 9.2 - 12.9 fL    Immature Granulocytes 0.2 0.0 - 0.5 %    Gran # (ANC) 5.3 1.8 - 7.7 K/uL    Immature Grans (Abs) 0.02 0.00 - 0.04 K/uL    Lymph # 2.6 1.0 - 4.8 K/uL    Mono # 0.8 0.3 - 1.0 K/uL    Eos # 0.2 0.0 - 0.5 K/uL    Baso # 0.02 0.00 - 0.20 K/uL    nRBC 0 0 /100 WBC    Gran % 59.2 38.0 - 73.0 %    Lymph % 29.4 18.0 - 48.0 %    Mono % 8.5 4.0 - 15.0 %    Eosinophil % 2.5 0.0 - 8.0 %    Basophil % 0.2 0.0 - 1.9 %    Differential Method Automated    Comprehensive Metabolic Panel (CMP)    Collection Time: 08/17/22  4:20 AM   Result Value Ref Range    Sodium 140 136 - 145 mmol/L    Potassium 4.7 3.5 - 5.1 mmol/L    Chloride 105 95 - 110 mmol/L    CO2 24 23 - 29 mmol/L    Glucose 151 (H) 70 - 110 mg/dL    BUN 11 6 - 20 mg/dL    Creatinine 1.2 0.5 - 1.4 mg/dL    Calcium 9.4 8.7 - 10.5 mg/dL    Total Protein 7.2 6.0 - 8.4 g/dL    Albumin 3.8 3.5 - 5.2 g/dL    Total Bilirubin 0.8 0.1 - 1.0 mg/dL    Alkaline Phosphatase 55 55 - 135 U/L    AST 29 10 - 40 U/L    ALT 36 10 - 44 U/L    Anion Gap 11 8 - 16 mmol/L    eGFR >60 >60 mL/min/1.73 m^2   Magnesium    Collection Time: 08/17/22  4:20 AM   Result Value Ref Range    Magnesium 1.9 1.6 - 2.6 mg/dL   Phosphorus    Collection  Time: 08/17/22  4:20 AM   Result Value Ref Range    Phosphorus 4.4 2.7 - 4.5 mg/dL   POCT glucose    Collection Time: 08/17/22  7:44 AM   Result Value Ref Range    POCT Glucose 146 (H) 70 - 110 mg/dL   POCT glucose    Collection Time: 08/17/22 11:31 AM   Result Value Ref Range    POCT Glucose 144 (H) 70 - 110 mg/dL             Rm Yao MD

## 2022-08-18 VITALS
DIASTOLIC BLOOD PRESSURE: 99 MMHG | BODY MASS INDEX: 44.1 KG/M2 | HEART RATE: 77 BPM | WEIGHT: 315 LBS | TEMPERATURE: 98 F | OXYGEN SATURATION: 95 % | HEIGHT: 71 IN | SYSTOLIC BLOOD PRESSURE: 136 MMHG | RESPIRATION RATE: 16 BRPM

## 2022-08-18 LAB
ALBUMIN SERPL BCP-MCNC: 3.5 G/DL (ref 3.5–5.2)
ALP SERPL-CCNC: 54 U/L (ref 55–135)
ALT SERPL W/O P-5'-P-CCNC: 37 U/L (ref 10–44)
ANION GAP SERPL CALC-SCNC: 12 MMOL/L (ref 8–16)
AST SERPL-CCNC: 23 U/L (ref 10–40)
BASOPHILS # BLD AUTO: 0.04 K/UL (ref 0–0.2)
BASOPHILS NFR BLD: 0.4 % (ref 0–1.9)
BILIRUB SERPL-MCNC: 0.4 MG/DL (ref 0.1–1)
BUN SERPL-MCNC: 11 MG/DL (ref 6–20)
CALCIUM SERPL-MCNC: 9.2 MG/DL (ref 8.7–10.5)
CHLORIDE SERPL-SCNC: 104 MMOL/L (ref 95–110)
CO2 SERPL-SCNC: 22 MMOL/L (ref 23–29)
CREAT SERPL-MCNC: 1.2 MG/DL (ref 0.5–1.4)
DIFFERENTIAL METHOD: ABNORMAL
EOSINOPHIL # BLD AUTO: 0.2 K/UL (ref 0–0.5)
EOSINOPHIL NFR BLD: 2.4 % (ref 0–8)
ERYTHROCYTE [DISTWIDTH] IN BLOOD BY AUTOMATED COUNT: 13.1 % (ref 11.5–14.5)
EST. GFR  (NO RACE VARIABLE): >60 ML/MIN/1.73 M^2
GLUCOSE SERPL-MCNC: 162 MG/DL (ref 70–110)
HCT VFR BLD AUTO: 44 % (ref 40–54)
HGB BLD-MCNC: 14.2 G/DL (ref 14–18)
IMM GRANULOCYTES # BLD AUTO: 0.03 K/UL (ref 0–0.04)
IMM GRANULOCYTES NFR BLD AUTO: 0.3 % (ref 0–0.5)
LYMPHOCYTES # BLD AUTO: 2.8 K/UL (ref 1–4.8)
LYMPHOCYTES NFR BLD: 31.2 % (ref 18–48)
MAGNESIUM SERPL-MCNC: 2 MG/DL (ref 1.6–2.6)
MCH RBC QN AUTO: 26.4 PG (ref 27–31)
MCHC RBC AUTO-ENTMCNC: 32.3 G/DL (ref 32–36)
MCV RBC AUTO: 82 FL (ref 82–98)
MONOCYTES # BLD AUTO: 1 K/UL (ref 0.3–1)
MONOCYTES NFR BLD: 10.6 % (ref 4–15)
NEUTROPHILS # BLD AUTO: 5 K/UL (ref 1.8–7.7)
NEUTROPHILS NFR BLD: 55.1 % (ref 38–73)
NRBC BLD-RTO: 0 /100 WBC
PHOSPHATE SERPL-MCNC: 4.6 MG/DL (ref 2.7–4.5)
PLATELET # BLD AUTO: 166 K/UL (ref 150–450)
PMV BLD AUTO: 11.1 FL (ref 9.2–12.9)
POCT GLUCOSE: 141 MG/DL (ref 70–110)
POTASSIUM SERPL-SCNC: 4.2 MMOL/L (ref 3.5–5.1)
PROT SERPL-MCNC: 7.4 G/DL (ref 6–8.4)
RBC # BLD AUTO: 5.37 M/UL (ref 4.6–6.2)
SODIUM SERPL-SCNC: 138 MMOL/L (ref 136–145)
WBC # BLD AUTO: 9.11 K/UL (ref 3.9–12.7)

## 2022-08-18 PROCEDURE — 84100 ASSAY OF PHOSPHORUS: CPT | Performed by: NURSE PRACTITIONER

## 2022-08-18 PROCEDURE — 99232 PR SUBSEQUENT HOSPITAL CARE,LEVL II: ICD-10-PCS | Mod: ,,, | Performed by: INTERNAL MEDICINE

## 2022-08-18 PROCEDURE — 99232 SBSQ HOSP IP/OBS MODERATE 35: CPT | Mod: ,,, | Performed by: INTERNAL MEDICINE

## 2022-08-18 PROCEDURE — 85025 COMPLETE CBC W/AUTO DIFF WBC: CPT | Performed by: NURSE PRACTITIONER

## 2022-08-18 PROCEDURE — 80053 COMPREHEN METABOLIC PANEL: CPT | Performed by: NURSE PRACTITIONER

## 2022-08-18 PROCEDURE — 83735 ASSAY OF MAGNESIUM: CPT | Performed by: NURSE PRACTITIONER

## 2022-08-18 PROCEDURE — 63600175 PHARM REV CODE 636 W HCPCS: Performed by: INTERNAL MEDICINE

## 2022-08-18 PROCEDURE — 99239 PR HOSPITAL DISCHARGE DAY,>30 MIN: ICD-10-PCS | Mod: ,,, | Performed by: INTERNAL MEDICINE

## 2022-08-18 PROCEDURE — 99239 HOSP IP/OBS DSCHRG MGMT >30: CPT | Mod: ,,, | Performed by: INTERNAL MEDICINE

## 2022-08-18 PROCEDURE — 36415 COLL VENOUS BLD VENIPUNCTURE: CPT | Performed by: NURSE PRACTITIONER

## 2022-08-18 RX ORDER — ALBUTEROL SULFATE 90 UG/1
1-2 AEROSOL, METERED RESPIRATORY (INHALATION) EVERY 4 HOURS PRN
Qty: 8.5 G | Refills: 0 | Status: SHIPPED | OUTPATIENT
Start: 2022-08-18 | End: 2023-05-23

## 2022-08-18 RX ADMIN — ENOXAPARIN SODIUM 170 MG: 100 INJECTION SUBCUTANEOUS at 09:08

## 2022-08-18 NOTE — PLAN OF CARE
08/18/2022      Hany Jaycob  [address]  [place] [address]          Hospital Medicine Dept.  [facility] [address] [place] [address] [phone]   Hany Devries has been hospitalized at the [facility] since 8/15/2022.  Please excuse the patient from duties.  Patient may return on 8/22/2022.  No restrictions.     Please contact the department of hospital medicine if you have any questions.                  __________________________  D Guido Still MD  08/18/2022

## 2022-08-18 NOTE — PLAN OF CARE
Pt states lives at home with wife and kids.Follow up appointments scheduled and added to AVS.   Family to provide transportation home.      08/18/22 1032   Final Note   Assessment Type Final Discharge Note   Anticipated Discharge Disposition Home   Hospital Resources/Appts/Education Provided Provided patient/caregiver with written discharge plan information;Appointments scheduled and added to AVS;Appointments scheduled by Navigator/Coordinator   Post-Acute Status   Discharge Delays None known at this time   [facility] ([facility])  Discharge Final Note    Primary Care Provider: Primary Doctor No    Expected Discharge Date: 8/18/2022    Final Discharge Note (most recent)       Final Note - 08/18/22 1032          Final Note    Assessment Type Final Discharge Note (P)      Anticipated Discharge Disposition Home or Self Care (P)      Hospital Resources/Appts/Education Provided Provided patient/caregiver with written discharge plan information;Appointments scheduled and added to AVS;Appointments scheduled by Navigator/Coordinator (P)         Post-Acute Status    Discharge Delays None known at this time (P)                      Important Message from Medicare             Contact Info       Michelle Dang MD   Specialty: Hematology and Oncology    [address]  [facility] [address]   Phone: [phone]       Next Steps: Follow up in 2 week(s)    Instructions: follow-up on recurrent blood clots    Rm Yao MD   Specialty: Cardiovascular Disease, Cardiology, Interventional Cardiology    [address] [facility]  [address]  [place] [facility] [address]   Phone: [phone]       Next Steps: Follow up in 2 week(s)    Instructions: follow-up for stress test

## 2022-08-18 NOTE — DISCHARGE SUMMARY
[facility], [place] [facility]  Discharge Summary      Patient Name: Hany Devries  MRN: [id number]  Patient Class: IP- Inpatient  Admission Date: 8/15/2022  Hospital Length of Stay: 2 days  Discharge Date and Time: 8/18/2022 12:20 PM  Attending Physician: Lisa att. providers found   Discharging Provider: SRI Still MD  Primary Care Provider: Primary Doctor Lisa      HPI:   The patient is a 42 y.o. 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  Patient states that his symptoms started this afternoon.  He was moving boxes into a storage unit and felt sudden onset of shortness of breath when he bent down to  a box.  He was able to complete moving all the boxes into a storage unit without any difficulty, and he states that his dyspnea improved with sitting in his air condition car and using his inhaler once.  He reports feeling a 2nd episode of dyspnea while at home that resolved quicker than the 1st also after using his inhaler.  Given his history, he did present to Urgent Care for evaluation and was directed to the emergency department for a PE rule out.  He denies any previous PE.  He was last diagnosed with a DVT approximately 6 months ago.  He has been off of his Eliquis secondary to his new insurance needing pre approval.  He denies any recent illness including fevers, chills, cough.  He also denies any PND or orthopnea.  He does report recent increased stress.  He currently denies any dyspnea, dizziness, or lightheadedness.  On CTA, the patient is found to have bilateral pulmonary embolisms.      * No surgery found *      Hospital Course:   Admitted with bilateral PE in the setting of having been off of chronic apixaban 2/2 insurance issues with obtaining refills. Placed on enoxaparin. Cardiology consulted with significant troponin elevation and  echo performed showing increased RV pressures. Continued on enoxaparin and monitored for potential decompensation given RV strain; no worsening. Converted to apixaban and ensured prescription in hand prior to discharge. Referred to cardiology for outpatient stress test and hematology for follow-up on recurrent chronic thrombosis. With clinical improvement and vital stability, he was prepared for discharge home.    Goals of Care Treatment Preferences:  Code Status: Full Code      Consults:   Consults (From admission, onward)        Status Ordering Provider     Inpatient consult to Cardiology  Once        Provider:  Rm Yao MD    Completed PATRICIA RAMSAY          No new Assessment & Plan notes have been filed under this hospital service since the last note was generated.  Service: Hospital Medicine    Final Active Diagnoses:    Diagnosis Date Noted POA    PRINCIPAL PROBLEM:  Bilateral pulmonary embolism [I26.99] 08/16/2022 Yes    Elevated troponin [R77.8] 08/16/2022 Yes    History of DVT (deep vein thrombosis) [Z86.718] 10/30/2015 Not Applicable      Problems Resolved During this Admission:       Discharged Condition: good    Disposition: Home or Self Care    Follow Up:    Patient Instructions:      Ambulatory referral/consult to Hematology / Oncology   Standing Status: Future   Referral Priority: Routine Referral Type: Consultation   Referral Reason: Specialty Services Required   Requested Specialty: Hematology and Oncology   Number of Visits Requested: 1     Ambulatory referral/consult to Internal Medicine   Standing Status: Future   Referral Priority: Routine Referral Type: Consultation   Referral Reason: Specialty Services Required   Requested Specialty: Internal Medicine   Number of Visits Requested: 1     Ambulatory referral/consult to Cardiology   Standing Status: Future   Referral Priority: Routine Referral Type: Consultation   Referral Reason: Specialty Services Required   Requested Specialty:  Cardiology   Number of Visits Requested: 1     Notify your health care provider if you experience any of the following:  severe uncontrolled pain     Notify your health care provider if you experience any of the following:  persistent dizziness, light-headedness, or visual disturbances     Notify your health care provider if you experience any of the following:  increased confusion or weakness     Notify your health care provider if you experience any of the following:  difficulty breathing or increased cough     Notify your health care provider if you experience any of the following:  temperature >100.4     Activity as tolerated       Significant Diagnostic Studies:   CBC:  Recent Labs   Lab 08/16/22  0601 08/17/22  0420 08/18/22 0418   WBC 10.60  10.60 8.94 9.11   HGB 13.8*  13.8* 14.4 14.2   HCT 42.6  42.6 44.9 44.0     169 168 166   GRAN 64.9  64.9  6.9  6.9 59.2  5.3 55.1  5.0   LYMPH 25.4  25.4  2.7  2.7 29.4  2.6 31.2  2.8   MONO 7.7  7.7  0.8  0.8 8.5  0.8 10.6  1.0   EOS 0.2  0.2 0.2 0.2   BASO 0.02  0.02 0.02 0.04     CMP:  Recent Labs   Lab 08/16/22  0601 08/17/22 0420 08/18/22 0418    140 138   K 4.2 4.7 4.2    105 104   CO2 24 24 22*   BUN 8 11 11   CREATININE 1.1 1.2 1.2   * 151* 162*   CALCIUM 9.2 9.4 9.2   MG 1.8 1.9 2.0   PHOS 4.1 4.4 4.6*   ALKPHOS 54* 55 54*   AST 32 29 23   ALT 39 36 37   BILITOT 0.8 0.8 0.4   PROT 7.3 7.2 7.4   ALBUMIN 3.7 3.8 3.5   ANIONGAP 12 11 12     Imaging Results           CTA Chest Non-Coronary (PE Study) (Final result)  Result time 08/16/22 00:08:11    Final result by Frank Branham MD (08/16/22 00:08:11)                 Impression:      1. Bilateral pulmonary emboli including distal main and segmental pulmonary arteries on the right and segmental pulmonary arteries on the left.  Follow-up recommended.  2.  This report was flagged in Epic as abnormal.      Electronically signed by: Frank  Destiny  Date:    08/16/2022  Time:    00:08             Narrative:    EXAMINATION:  CTA CHEST NON CORONARY    CLINICAL HISTORY:  Pulmonary embolism (PE) suspected, unknown D-dimer;    TECHNIQUE:  Low dose axial images, sagittal and coronal reformations were obtained from the thoracic inlet to the lung bases following the IV administration of 100 mL of Omnipaque 350.  Contrast timing was optimized to evaluate the pulmonary arteries.  MIP images were performed.    COMPARISON:  None    FINDINGS:  Pulmonary arteries:Pulmonary arteries are suboptimally enhanced.There are filling defects in the distal right main and proximal segmental pulmonary arteries in upper and lower lobes on the right.  Left lower and left upper lobe segmental pulmonary emboli on the left also.  Follow-up recommended.    Thoracic soft tissues: Unremarkable.    Aorta: Left-sided aortic arch.  No aneurysm or dissection.    Heart: Normal size. No effusion.    Tania/Mediastinum: No pathologic isis enlargement.    Airways: Patent.    Lungs/Pleura: Clear lungs. No pleural effusion or thickening.    Esophagus: Unremarkable.    Upper Abdomen: No abnormality of the partially imaged upper abdomen.    Bones: No acute fracture. No suspicious lytic or sclerotic lesions.                                Pending Diagnostic Studies:     None         Medications:  Reconciled Home Medications:      Medication List      CHANGE how you take these medications    albuterol 90 mcg/actuation inhaler  Commonly known as: PROVENTIL/VENTOLIN HFA  Inhale 1-2 puffs into the lungs every 4 (four) hours as needed for Shortness of Breath or Wheezing.  What changed: how to take this        CONTINUE taking these medications    ELIQUIS 5 mg Tab  Generic drug: apixaban  Take 1 tablet (5 mg total) by mouth 2 (two) times daily.     metFORMIN 500 MG tablet  Commonly known as: GLUCOPHAGE  Take 500 mg by mouth once daily.            Indwelling Lines/Drains at time of discharge:    Lines/Drains/Airways     None                 Time spent on the discharge of patient: 35 minutes         SRI Still MD  Department of Hospital Medicine  [facility], [place], operated by [facility] - Med Surg ([place])

## 2022-08-18 NOTE — PROGRESS NOTES
[facility]    Admission date:  8/15/2022     Assessment    Acute pulmonary embolism with cor pulmonale  Doing well with anticoagulation     Elevated troponin   Almost certainly related to right ventricular strain.    Plan and Discussion    Anticipating discharge soon.  Discussed my recommendations for outpatient stress test once he recovers.    Subjective    Feels well.  No complaints.    Medications  Current Facility-Administered Medications   Medication Dose Route Frequency Provider Last Rate Last Admin    acetaminophen tablet 650 mg  650 mg Oral Q4H PRN Dashawn Rose NP        enoxaparin injection 170 mg  1 mg/kg Subcutaneous Q12H PATRICIA Still MD   170 mg at 08/17/22 2051    glucagon (human recombinant) injection 1 mg  1 mg Intramuscular PRN Dashawn Rose NP        glucose chewable tablet 16 g  16 g Oral PRN Dashawn Rose, NP        glucose chewable tablet 24 g  24 g Oral PRN Dashawn Rose NP        insulin aspart U-100 pen 0-5 Units  0-5 Units Subcutaneous QID (AC + HS) PRN Dashawn Rose NP        naloxone 0.4 mg/mL injection 0.02 mg  0.02 mg Intravenous PRN Dashawn Rose NP        ondansetron injection 4 mg  4 mg Intravenous Q8H PRN Dashawn Rose NP        sodium chloride 0.9% flush 10 mL  10 mL Intravenous Q8H PRN Dashawn Rose NP            Physical Exam    Temp:  [98.2 °F (36.8 °C)-99 °F (37.2 °C)]   Pulse:  [77-97]   Resp:  [16-20]   BP: (114-146)/(58-94)   SpO2:  [94 %-99 %]    Wt Readings from Last 3 Encounters:   08/16/22 (!) 166.5 kg (367 lb)   08/15/22 (!) 158.8 kg (350 lb)     Physical Exam  Constitutional:       General: He is not in acute distress.     Appearance: He is obese.   Neck:      Vascular: No hepatojugular reflux or JVD.   Cardiovascular:      Rate and Rhythm: Normal rate and regular rhythm.      Heart sounds: S1 normal and S2 normal. No murmur heard.    No S3 or S4 sounds.   Pulmonary:      Effort: Pulmonary  effort is normal.      Breath sounds: Normal breath sounds and air entry.   Abdominal:      General: Bowel sounds are normal.      Palpations: Abdomen is soft. There is no hepatomegaly.      Tenderness: There is no abdominal tenderness.   Musculoskeletal:      Right lower leg: No edema.      Left lower leg: No edema.   Skin:     Coloration: Skin is not pale.   Neurological:      Mental Status: He is alert.   Psychiatric:         Behavior: Behavior is cooperative.           Labs  Recent Results (from the past 24 hour(s))   POCT glucose    Collection Time: 08/17/22 11:31 AM   Result Value Ref Range    POCT Glucose 144 (H) 70 - 110 mg/dL   POCT glucose    Collection Time: 08/17/22  4:42 PM   Result Value Ref Range    POCT Glucose 116 (H) 70 - 110 mg/dL   POCT glucose    Collection Time: 08/17/22  8:53 PM   Result Value Ref Range    POCT Glucose 123 (H) 70 - 110 mg/dL   CBC auto differential    Collection Time: 08/18/22  4:18 AM   Result Value Ref Range    WBC 9.11 3.90 - 12.70 K/uL    RBC 5.37 4.60 - 6.20 M/uL    Hemoglobin 14.2 14.0 - 18.0 g/dL    Hematocrit 44.0 40.0 - 54.0 %    MCV 82 82 - 98 fL    MCH 26.4 (L) 27.0 - 31.0 pg    MCHC 32.3 32.0 - 36.0 g/dL    RDW 13.1 11.5 - 14.5 %    Platelets 166 150 - 450 K/uL    MPV 11.1 9.2 - 12.9 fL    Immature Granulocytes 0.3 0.0 - 0.5 %    Gran # (ANC) 5.0 1.8 - 7.7 K/uL    Immature Grans (Abs) 0.03 0.00 - 0.04 K/uL    Lymph # 2.8 1.0 - 4.8 K/uL    Mono # 1.0 0.3 - 1.0 K/uL    Eos # 0.2 0.0 - 0.5 K/uL    Baso # 0.04 0.00 - 0.20 K/uL    nRBC 0 0 /100 WBC    Gran % 55.1 38.0 - 73.0 %    Lymph % 31.2 18.0 - 48.0 %    Mono % 10.6 4.0 - 15.0 %    Eosinophil % 2.4 0.0 - 8.0 %    Basophil % 0.4 0.0 - 1.9 %    Differential Method Automated    Comprehensive Metabolic Panel (CMP)    Collection Time: 08/18/22  4:18 AM   Result Value Ref Range    Sodium 138 136 - 145 mmol/L    Potassium 4.2 3.5 - 5.1 mmol/L    Chloride 104 95 - 110 mmol/L    CO2 22 (L) 23 - 29 mmol/L    Glucose 162 (H)  70 - 110 mg/dL    BUN 11 6 - 20 mg/dL    Creatinine 1.2 0.5 - 1.4 mg/dL    Calcium 9.2 8.7 - 10.5 mg/dL    Total Protein 7.4 6.0 - 8.4 g/dL    Albumin 3.5 3.5 - 5.2 g/dL    Total Bilirubin 0.4 0.1 - 1.0 mg/dL    Alkaline Phosphatase 54 (L) 55 - 135 U/L    AST 23 10 - 40 U/L    ALT 37 10 - 44 U/L    Anion Gap 12 8 - 16 mmol/L    eGFR >60 >60 mL/min/1.73 m^2   Magnesium    Collection Time: 08/18/22  4:18 AM   Result Value Ref Range    Magnesium 2.0 1.6 - 2.6 mg/dL   Phosphorus    Collection Time: 08/18/22  4:18 AM   Result Value Ref Range    Phosphorus 4.6 (H) 2.7 - 4.5 mg/dL   POCT glucose    Collection Time: 08/18/22  8:33 AM   Result Value Ref Range    POCT Glucose 141 (H) 70 - 110 mg/dL            Rm Yao MD

## 2022-08-25 ENCOUNTER — OFFICE VISIT (OUTPATIENT)
Dept: INTERNAL MEDICINE | Facility: CLINIC | Age: 42
End: 2022-08-25
Payer: COMMERCIAL

## 2022-08-25 VITALS
OXYGEN SATURATION: 98 % | HEIGHT: 71 IN | WEIGHT: 315 LBS | BODY MASS INDEX: 44.1 KG/M2 | HEART RATE: 70 BPM | SYSTOLIC BLOOD PRESSURE: 135 MMHG | DIASTOLIC BLOOD PRESSURE: 88 MMHG

## 2022-08-25 DIAGNOSIS — F32.A DEPRESSION, UNSPECIFIED DEPRESSION TYPE: ICD-10-CM

## 2022-08-25 DIAGNOSIS — I26.99 BILATERAL PULMONARY EMBOLISM: ICD-10-CM

## 2022-08-25 DIAGNOSIS — J45.20 MILD INTERMITTENT ASTHMA WITHOUT COMPLICATION: ICD-10-CM

## 2022-08-25 DIAGNOSIS — F41.9 ANXIETY: ICD-10-CM

## 2022-08-25 DIAGNOSIS — E11.9 TYPE 2 DIABETES MELLITUS WITHOUT COMPLICATION, WITHOUT LONG-TERM CURRENT USE OF INSULIN: Primary | ICD-10-CM

## 2022-08-25 PROBLEM — E66.01 CLASS 3 SEVERE OBESITY DUE TO EXCESS CALORIES WITH SERIOUS COMORBIDITY AND BODY MASS INDEX (BMI) OF 45.0 TO 49.9 IN ADULT: Status: ACTIVE | Noted: 2021-04-18

## 2022-08-25 PROBLEM — E66.813 CLASS 3 SEVERE OBESITY DUE TO EXCESS CALORIES WITH SERIOUS COMORBIDITY AND BODY MASS INDEX (BMI) OF 45.0 TO 49.9 IN ADULT: Status: ACTIVE | Noted: 2021-04-18

## 2022-08-25 PROCEDURE — 1160F RVW MEDS BY RX/DR IN RCRD: CPT | Mod: CPTII,S$GLB,, | Performed by: PHYSICIAN ASSISTANT

## 2022-08-25 PROCEDURE — 3079F DIAST BP 80-89 MM HG: CPT | Mod: CPTII,S$GLB,, | Performed by: PHYSICIAN ASSISTANT

## 2022-08-25 PROCEDURE — 3008F PR BODY MASS INDEX (BMI) DOCUMENTED: ICD-10-PCS | Mod: CPTII,S$GLB,, | Performed by: PHYSICIAN ASSISTANT

## 2022-08-25 PROCEDURE — 3051F PR MOST RECENT HEMOGLOBIN A1C LEVEL 7.0 - < 8.0%: ICD-10-PCS | Mod: CPTII,S$GLB,, | Performed by: PHYSICIAN ASSISTANT

## 2022-08-25 PROCEDURE — 1111F PR DISCHARGE MEDS RECONCILED W/ CURRENT OUTPATIENT MED LIST: ICD-10-PCS | Mod: CPTII,S$GLB,, | Performed by: PHYSICIAN ASSISTANT

## 2022-08-25 PROCEDURE — 99999 PR PBB SHADOW E&M-EST. PATIENT-LVL IV: ICD-10-PCS | Mod: PBBFAC,,, | Performed by: PHYSICIAN ASSISTANT

## 2022-08-25 PROCEDURE — 99204 OFFICE O/P NEW MOD 45 MIN: CPT | Mod: S$GLB,,, | Performed by: PHYSICIAN ASSISTANT

## 2022-08-25 PROCEDURE — 99204 PR OFFICE/OUTPT VISIT, NEW, LEVL IV, 45-59 MIN: ICD-10-PCS | Mod: S$GLB,,, | Performed by: PHYSICIAN ASSISTANT

## 2022-08-25 PROCEDURE — 99999 PR PBB SHADOW E&M-EST. PATIENT-LVL IV: CPT | Mod: PBBFAC,,, | Performed by: PHYSICIAN ASSISTANT

## 2022-08-25 PROCEDURE — 1159F PR MEDICATION LIST DOCUMENTED IN MEDICAL RECORD: ICD-10-PCS | Mod: CPTII,S$GLB,, | Performed by: PHYSICIAN ASSISTANT

## 2022-08-25 PROCEDURE — 1160F PR REVIEW ALL MEDS BY PRESCRIBER/CLIN PHARMACIST DOCUMENTED: ICD-10-PCS | Mod: CPTII,S$GLB,, | Performed by: PHYSICIAN ASSISTANT

## 2022-08-25 PROCEDURE — 1111F DSCHRG MED/CURRENT MED MERGE: CPT | Mod: CPTII,S$GLB,, | Performed by: PHYSICIAN ASSISTANT

## 2022-08-25 PROCEDURE — 3008F BODY MASS INDEX DOCD: CPT | Mod: CPTII,S$GLB,, | Performed by: PHYSICIAN ASSISTANT

## 2022-08-25 PROCEDURE — 3075F SYST BP GE 130 - 139MM HG: CPT | Mod: CPTII,S$GLB,, | Performed by: PHYSICIAN ASSISTANT

## 2022-08-25 PROCEDURE — 1159F MED LIST DOCD IN RCRD: CPT | Mod: CPTII,S$GLB,, | Performed by: PHYSICIAN ASSISTANT

## 2022-08-25 PROCEDURE — 3075F PR MOST RECENT SYSTOLIC BLOOD PRESS GE 130-139MM HG: ICD-10-PCS | Mod: CPTII,S$GLB,, | Performed by: PHYSICIAN ASSISTANT

## 2022-08-25 PROCEDURE — 3051F HG A1C>EQUAL 7.0%<8.0%: CPT | Mod: CPTII,S$GLB,, | Performed by: PHYSICIAN ASSISTANT

## 2022-08-25 PROCEDURE — 3079F PR MOST RECENT DIASTOLIC BLOOD PRESSURE 80-89 MM HG: ICD-10-PCS | Mod: CPTII,S$GLB,, | Performed by: PHYSICIAN ASSISTANT

## 2022-08-25 RX ORDER — ESCITALOPRAM OXALATE 5 MG/1
5 TABLET ORAL DAILY
Qty: 30 TABLET | Refills: 1 | Status: SHIPPED | OUTPATIENT
Start: 2022-08-25 | End: 2022-09-28

## 2022-08-25 RX ORDER — METFORMIN HYDROCHLORIDE 500 MG/1
500 TABLET, EXTENDED RELEASE ORAL 2 TIMES DAILY WITH MEALS
Qty: 180 TABLET | Refills: 1 | Status: SHIPPED | OUTPATIENT
Start: 2022-08-25 | End: 2023-01-23 | Stop reason: DRUGHIGH

## 2022-08-25 NOTE — PHYSICIAN QUERY
PT Name: Hany Devries  MR #: [id number]     Documentation Clarification      CDS: Darcy Marshall RN      Contact information:[email or web address]    This form is a permanent document in the medical record.     Query Date: August 25, 2022    By submitting this query, we are merely seeking further clarification of documentation. Please utilize your independent clinical judgment when addressing the question(s) below.    The Medical Record reflects the following:    Supporting Clinical Findings Location in Medical Record   Acute pulmonary embolism    Acute pulmonary embolism with cor pulmonale 8/16  H&P    8/16 Cardiology Consult Note and 8/17-8/18 Cardiology PNs     Bilateral pulmonary emboli including distal main and segmental pulmonary arteries on the right and segmental pulmonary arteries on the left 8/15 CTA Chest Non-Coronary (PE Study)   The left ventricle is normal in size with concentric hypertrophy and normal systolic function.  There is abnormal septal wall motion. There is systolic flattening of the interventricular septum consistent with right ventricle pressure overload.  Moderate right ventricular enlargement.  Moderate right atrial enlargement.  Interatrial septum bows to left, consider elevated right atrial pressure.  Mild to moderate tricuspid regurgitation.  There is mild to moderate pulmonary hypertension.   8/16 Transthoracic echo (TTE) complete   Elevated troponin   Almost certainly related to right ventricular strain. 8/17, 8/18 Cardiology PNs   Admitted with bilateral PE in the setting of having been off of chronic apixaban    Cardiology consulted with significant troponin elevation and echo performed showing increased RV pressures.    Monitored for potential decompensation given RV strain   8/18 DC Summary                                                                            Provider, due to conflicting documentation, please clarify the diagnosis of Acute Pulmonary Embolism:     [   ]  Acute Pulmonary Embolism with Acute Cor Pulmonale      [   ] Acute Pulmonary Embolism without Acute Cor Pulmonale     [   ] Other (please specify): ____________     [ x ] Clinically undetermined

## 2022-08-25 NOTE — PROGRESS NOTES
Subjective:       Patient ID: Hany Devries is a 42 y.o. male.    Chief Complaint: Follow-up      Established pt of Primary Doctor No (new to me)    HPI        Here for f/u after recent admission (acute PE) and to establish care.     Recently moved back to Home to [place] from [place].       Hx of DVT (7 years ago), Had trouble obtaining eliquis due to insurance issues. Experienced acute sob prompting him to the urgent care/ED on 8/15. Found to have bilateral PE. He has f/u next week with Cardiology and Hematology. He reports seeing Heme in tx for hypercoagulable workup.     DM: on Metformin, last A1c 7/5% (aug 2022). Note occ diarrhea.    Depression/Anxiety: Prev on sertraline, helped but felt emotionless. Recently restarted therapy. Notes sister and mother passed away. Work stressors contribute to anxiety/insomnia. Interested in trial of another medication. No SI/HI.    Hx Asthma: well controlled on albuterol as needed. Prev on Advair several years ago.        Past Medical History:   Diagnosis Date    Asthma     Bilateral pulmonary embolism     Diabetes mellitus, type 2     DVT (deep venous thrombosis)        Social History     Tobacco Use    Smoking status: Never    Smokeless tobacco: Never   Substance Use Topics    Alcohol use: Yes     Alcohol/week: 2.0 standard drinks     Types: 2 Shots of liquor per week    Drug use: Never       Review of patient's allergies indicates:  No Known Allergies      Current Outpatient Medications:     albuterol (PROVENTIL/VENTOLIN HFA) 90 mcg/actuation inhaler, Inhale 1-2 puffs into the lungs every 4 (four) hours as needed for Shortness of Breath or Wheezing., Disp: 8.5 g, Rfl: 0    apixaban (ELIQUIS) 5 mg Tab, Take 1 tablet (5 mg total) by mouth 2 (two) times daily., Disp: 60 tablet, Rfl: 0    metFORMIN (GLUCOPHAGE) 500 MG tablet, Take 500 mg by mouth once daily., Disp: , Rfl:     Family History   Problem Relation Age of Onset    Cancer Mother         breast/lung    Diabetes Sister  "    Stroke Brother        Past Surgical History:   Procedure Laterality Date    HERNIA REPAIR      VASECTOMY  4/2015       Review of Systems   Constitutional:  Negative for chills, fever and unexpected weight change.   Respiratory:  Negative for cough and shortness of breath.    Cardiovascular:  Negative for chest pain and leg swelling.   Gastrointestinal:  Negative for abdominal pain, nausea and vomiting.   Integumentary:  Negative for rash.   Neurological:  Negative for weakness and headaches.   Psychiatric/Behavioral:  Positive for dysphoric mood. The patient is nervous/anxious.      Objective: /88 (BP Location: Left arm, Patient Position: Sitting, BP Method: Large (Manual))   Pulse 70   Ht 5' 11" (1.803 m)   Wt (!) 165.1 kg (363 lb 15.7 oz)   SpO2 98%   BMI 50.76 kg/m²         Physical Exam  Vitals reviewed.   Constitutional:       General: He is not in acute distress.     Appearance: He is well-developed. He is not ill-appearing.   HENT:      Head: Normocephalic and atraumatic.   Cardiovascular:      Rate and Rhythm: Normal rate and regular rhythm.      Heart sounds: No murmur heard.  Pulmonary:      Effort: Pulmonary effort is normal.      Breath sounds: Normal breath sounds. No wheezing or rales.   Abdominal:      General: Bowel sounds are normal.      Palpations: Abdomen is soft.      Tenderness: There is no abdominal tenderness.   Musculoskeletal:      Right lower leg: No edema.      Left lower leg: No edema.   Skin:     General: Skin is warm and dry.      Findings: No rash.   Neurological:      Mental Status: He is alert.       Assessment:       1. Type 2 diabetes mellitus without complication, without long-term current use of insulin    2. Mild intermittent asthma without complication    3. Anxiety    4. Bilateral pulmonary embolism    5. Depression, unspecified depression type        Plan:             Hany was seen today for follow-up.    Diagnoses and all orders for this visit:    Type 2 " diabetes mellitus without complication, without long-term current use of insulin  Changed to XR   Discussed other agents like Ozempic, pt declines at this time  -     metFORMIN (GLUCOPHAGE-XR) 500 MG ER 24hr tablet; Take 1 tablet (500 mg total) by mouth 2 (two) times daily with meals.    Mild intermittent asthma without complication  Stable on current inhalers    Anxiety  Depression, unspecified depression type  Trial of lexapro  RTC in 4 weeks for med eval  -     EScitalopram oxalate (LEXAPRO) 5 MG Tab; Take 1 tablet (5 mg total) by mouth once daily.    Bilateral pulmonary embolism  SOB/symptoms prior to admission have resolved  Notes adherence with eliquis  Keep heme and cardiology f/u  -     Ambulatory referral/consult to Internal Medicine        Discussed need to choose MD as PCP to fully establish care with our practice site (will arrange f/u with Dr. Spivey)  Review outside records (careeverywhere)    Future Appointments   Date Time Provider Department Center   8/29/2022 11:00 AM Rm Yao MD [id number]   9/8/2022  1:00 PM Michelle Dang MD [facility] HEM ONC [facility]     Lauren Pool PA-C

## 2022-09-02 ENCOUNTER — LAB VISIT (OUTPATIENT)
Dept: LAB | Facility: OTHER | Age: 42
End: 2022-09-02
Attending: STUDENT IN AN ORGANIZED HEALTH CARE EDUCATION/TRAINING PROGRAM
Payer: COMMERCIAL

## 2022-09-02 ENCOUNTER — OFFICE VISIT (OUTPATIENT)
Dept: HEMATOLOGY/ONCOLOGY | Facility: CLINIC | Age: 42
End: 2022-09-02
Payer: COMMERCIAL

## 2022-09-02 VITALS
RESPIRATION RATE: 18 BRPM | SYSTOLIC BLOOD PRESSURE: 139 MMHG | TEMPERATURE: 99 F | BODY MASS INDEX: 44.1 KG/M2 | HEART RATE: 87 BPM | WEIGHT: 315 LBS | HEIGHT: 71 IN | DIASTOLIC BLOOD PRESSURE: 86 MMHG | OXYGEN SATURATION: 97 %

## 2022-09-02 DIAGNOSIS — I26.99 BILATERAL PULMONARY EMBOLISM: ICD-10-CM

## 2022-09-02 PROCEDURE — 3075F SYST BP GE 130 - 139MM HG: CPT | Mod: CPTII,S$GLB,, | Performed by: STUDENT IN AN ORGANIZED HEALTH CARE EDUCATION/TRAINING PROGRAM

## 2022-09-02 PROCEDURE — 3075F PR MOST RECENT SYSTOLIC BLOOD PRESS GE 130-139MM HG: ICD-10-PCS | Mod: CPTII,S$GLB,, | Performed by: STUDENT IN AN ORGANIZED HEALTH CARE EDUCATION/TRAINING PROGRAM

## 2022-09-02 PROCEDURE — 3051F HG A1C>EQUAL 7.0%<8.0%: CPT | Mod: CPTII,S$GLB,, | Performed by: STUDENT IN AN ORGANIZED HEALTH CARE EDUCATION/TRAINING PROGRAM

## 2022-09-02 PROCEDURE — 99999 PR PBB SHADOW E&M-EST. PATIENT-LVL IV: CPT | Mod: PBBFAC,,, | Performed by: STUDENT IN AN ORGANIZED HEALTH CARE EDUCATION/TRAINING PROGRAM

## 2022-09-02 PROCEDURE — 1159F PR MEDICATION LIST DOCUMENTED IN MEDICAL RECORD: ICD-10-PCS | Mod: CPTII,S$GLB,, | Performed by: STUDENT IN AN ORGANIZED HEALTH CARE EDUCATION/TRAINING PROGRAM

## 2022-09-02 PROCEDURE — 3079F DIAST BP 80-89 MM HG: CPT | Mod: CPTII,S$GLB,, | Performed by: STUDENT IN AN ORGANIZED HEALTH CARE EDUCATION/TRAINING PROGRAM

## 2022-09-02 PROCEDURE — 81240 F2 GENE: CPT | Performed by: STUDENT IN AN ORGANIZED HEALTH CARE EDUCATION/TRAINING PROGRAM

## 2022-09-02 PROCEDURE — 86147 CARDIOLIPIN ANTIBODY EA IG: CPT | Mod: 59 | Performed by: STUDENT IN AN ORGANIZED HEALTH CARE EDUCATION/TRAINING PROGRAM

## 2022-09-02 PROCEDURE — 1160F PR REVIEW ALL MEDS BY PRESCRIBER/CLIN PHARMACIST DOCUMENTED: ICD-10-PCS | Mod: CPTII,S$GLB,, | Performed by: STUDENT IN AN ORGANIZED HEALTH CARE EDUCATION/TRAINING PROGRAM

## 2022-09-02 PROCEDURE — 1111F DSCHRG MED/CURRENT MED MERGE: CPT | Mod: CPTII,S$GLB,, | Performed by: STUDENT IN AN ORGANIZED HEALTH CARE EDUCATION/TRAINING PROGRAM

## 2022-09-02 PROCEDURE — 86146 BETA-2 GLYCOPROTEIN ANTIBODY: CPT | Mod: 59 | Performed by: STUDENT IN AN ORGANIZED HEALTH CARE EDUCATION/TRAINING PROGRAM

## 2022-09-02 PROCEDURE — 3051F PR MOST RECENT HEMOGLOBIN A1C LEVEL 7.0 - < 8.0%: ICD-10-PCS | Mod: CPTII,S$GLB,, | Performed by: STUDENT IN AN ORGANIZED HEALTH CARE EDUCATION/TRAINING PROGRAM

## 2022-09-02 PROCEDURE — 1111F PR DISCHARGE MEDS RECONCILED W/ CURRENT OUTPATIENT MED LIST: ICD-10-PCS | Mod: CPTII,S$GLB,, | Performed by: STUDENT IN AN ORGANIZED HEALTH CARE EDUCATION/TRAINING PROGRAM

## 2022-09-02 PROCEDURE — 1160F RVW MEDS BY RX/DR IN RCRD: CPT | Mod: CPTII,S$GLB,, | Performed by: STUDENT IN AN ORGANIZED HEALTH CARE EDUCATION/TRAINING PROGRAM

## 2022-09-02 PROCEDURE — 81241 F5 GENE: CPT | Performed by: STUDENT IN AN ORGANIZED HEALTH CARE EDUCATION/TRAINING PROGRAM

## 2022-09-02 PROCEDURE — 99999 PR PBB SHADOW E&M-EST. PATIENT-LVL IV: ICD-10-PCS | Mod: PBBFAC,,, | Performed by: STUDENT IN AN ORGANIZED HEALTH CARE EDUCATION/TRAINING PROGRAM

## 2022-09-02 PROCEDURE — 36415 COLL VENOUS BLD VENIPUNCTURE: CPT | Performed by: STUDENT IN AN ORGANIZED HEALTH CARE EDUCATION/TRAINING PROGRAM

## 2022-09-02 PROCEDURE — 1159F MED LIST DOCD IN RCRD: CPT | Mod: CPTII,S$GLB,, | Performed by: STUDENT IN AN ORGANIZED HEALTH CARE EDUCATION/TRAINING PROGRAM

## 2022-09-02 PROCEDURE — 3079F PR MOST RECENT DIASTOLIC BLOOD PRESSURE 80-89 MM HG: ICD-10-PCS | Mod: CPTII,S$GLB,, | Performed by: STUDENT IN AN ORGANIZED HEALTH CARE EDUCATION/TRAINING PROGRAM

## 2022-09-02 PROCEDURE — 99203 OFFICE O/P NEW LOW 30 MIN: CPT | Mod: S$GLB,,, | Performed by: STUDENT IN AN ORGANIZED HEALTH CARE EDUCATION/TRAINING PROGRAM

## 2022-09-02 PROCEDURE — 3008F BODY MASS INDEX DOCD: CPT | Mod: CPTII,S$GLB,, | Performed by: STUDENT IN AN ORGANIZED HEALTH CARE EDUCATION/TRAINING PROGRAM

## 2022-09-02 PROCEDURE — 99203 PR OFFICE/OUTPT VISIT, NEW, LEVL III, 30-44 MIN: ICD-10-PCS | Mod: S$GLB,,, | Performed by: STUDENT IN AN ORGANIZED HEALTH CARE EDUCATION/TRAINING PROGRAM

## 2022-09-02 PROCEDURE — 3008F PR BODY MASS INDEX (BMI) DOCUMENTED: ICD-10-PCS | Mod: CPTII,S$GLB,, | Performed by: STUDENT IN AN ORGANIZED HEALTH CARE EDUCATION/TRAINING PROGRAM

## 2022-09-02 NOTE — PROGRESS NOTES
Hematology- Oncology Clinic Note :      9/2/2022    RFV / chief complaint- Referral        HPI  Pt is a 42 y.o. male who  has a past medical history of Asthma, Bilateral pulmonary embolism, Diabetes mellitus, type 2, and DVT (deep venous thrombosis).   Pt presents to the clinic today for DVT/ anticoagulation recs    DVT R leg at the age of 34- was on eliquis  2017 - was taken off eliquis for few months, then   developed DVT in LLE   since then has been on eliquis.   recently had a lapse and was not able to get eliquis due to insurance changes.   Aug 2022- b/l PE   immediate family- mother &7 sibling- none with VTE. father  had DVT after paralysis. maternal aunt passed away from VTE, her 3 daughters have DVT. pt 2 kids    Pt has been tolerating eliquis without any bleeding issues.    Reviewed past medical/surgical/social history    Past Medical History:   Diagnosis Date    Asthma     Bilateral pulmonary embolism     Diabetes mellitus, type 2     DVT (deep venous thrombosis)       Past Surgical History:   Procedure Laterality Date    HERNIA REPAIR      VASECTOMY  4/2015      Review of patient's allergies indicates:  No Known Allergies   Social History     Tobacco Use    Smoking status: Never    Smokeless tobacco: Never   Substance Use Topics    Alcohol use: Yes     Alcohol/week: 2.0 standard drinks     Types: 2 Shots of liquor per week      Family History   Problem Relation Age of Onset    Cancer Mother         breast/lung    Diabetes Sister     Stroke Brother           Review of Systems :  Review of Systems   Constitutional:  Negative for chills, diaphoresis, fever, malaise/fatigue and weight loss.   HENT: Negative.  Negative for congestion, hearing loss, nosebleeds, sore throat and tinnitus.    Eyes: Negative.  Negative for blurred vision and discharge.   Respiratory:  Negative for cough, hemoptysis, sputum production, shortness of breath and wheezing.    Cardiovascular: Negative.  Negative for chest pain,  "palpitations and leg swelling.   Gastrointestinal: Negative.  Negative for abdominal pain, blood in stool, constipation, diarrhea, heartburn, melena, nausea and vomiting.   Genitourinary: Negative.    Musculoskeletal: Negative.  Negative for back pain, falls, joint pain and myalgias.   Skin: Negative.  Negative for itching and rash.   Neurological: Negative.  Negative for dizziness, tingling, sensory change, speech change, focal weakness, seizures, loss of consciousness, weakness and headaches.   Endo/Heme/Allergies: Negative.  Does not bruise/bleed easily.   Psychiatric/Behavioral: Negative.  Negative for depression. The patient is not nervous/anxious and does not have insomnia.              Physical Exam :  /86 (BP Location: Left arm, Patient Position: Sitting, BP Method: Medium (Automatic))   Pulse 87   Temp 98.9 °F (37.2 °C) (Oral)   Resp 18   Ht 5' 11" (1.803 m)   Wt (!) 164 kg (361 lb 8.9 oz)   SpO2 97%   BMI 50.43 kg/m²   Wt Readings from Last 3 Encounters:   09/02/22 (!) 164 kg (361 lb 8.9 oz)   08/25/22 (!) 165.1 kg (363 lb 15.7 oz)   08/16/22 (!) 166.5 kg (367 lb)       Body mass index is 50.43 kg/m².      Physical Exam  Vitals and nursing note reviewed.   Constitutional:       General: He is not in acute distress.     Appearance: He is well-developed.   HENT:      Head: Normocephalic and atraumatic.      Mouth/Throat:      Pharynx: No oropharyngeal exudate.   Eyes:      General: No scleral icterus.        Right eye: No discharge.         Left eye: No discharge.   Neck:      Thyroid: No thyromegaly.      Trachea: No tracheal deviation.   Cardiovascular:      Rate and Rhythm: Normal rate and regular rhythm.      Heart sounds: Normal heart sounds. No murmur heard.  Pulmonary:      Effort: Pulmonary effort is normal. No respiratory distress.      Breath sounds: Normal breath sounds. No wheezing or rales.   Abdominal:      General: Bowel sounds are normal. There is no distension.      Palpations: " Abdomen is soft.      Tenderness: There is no abdominal tenderness.      Hernia: No hernia is present.   Musculoskeletal:         General: No tenderness. Normal range of motion.      Cervical back: Normal range of motion and neck supple.   Skin:     General: Skin is warm and dry.      Findings: No rash.   Neurological:      Mental Status: He is alert and oriented to person, place, and time.      Cranial Nerves: No cranial nerve deficit.      Coordination: Coordination normal.   Psychiatric:         Behavior: Behavior normal.           Current Outpatient Medications   Medication Sig Dispense Refill    albuterol (PROVENTIL/VENTOLIN HFA) 90 mcg/actuation inhaler Inhale 1-2 puffs into the lungs every 4 (four) hours as needed for Shortness of Breath or Wheezing. 8.5 g 0    apixaban (ELIQUIS) 5 mg Tab Take 1 tablet (5 mg total) by mouth 2 (two) times daily. 60 tablet 0    EScitalopram oxalate (LEXAPRO) 5 MG Tab Take 1 tablet (5 mg total) by mouth once daily. 30 tablet 1    metFORMIN (GLUCOPHAGE-XR) 500 MG ER 24hr tablet Take 1 tablet (500 mg total) by mouth 2 (two) times daily with meals. 180 tablet 1     No current facility-administered medications for this visit.       Pertinent Diagnostic studies:      No visits with results within 1 Week(s) from this visit.   Latest known visit with results is:   No results displayed because visit has over 200 results.              Oncology History    No history exists.     Assessment :   ECOG 1    1. Bilateral pulmonary embolism        Plan :       Unprovoked DVT/PE  DVT R leg at the age of 34- was on eliquis  2017 - was taken off eliquis for few months, then developed DVT in LLE ; since then has been on eliquis.   recently had a lapse and was not able to get eliquis due to insurance changes.   Aug 2022- b/l PE   CTA 8/15/22- 1. Bilateral pulmonary emboli including distal main and segmental pulmonary arteries on the right and segmental pulmonary arteries on the left.   Continue  eliquis lifelong unless contraindicated   Pt interested in hypercoagulation work up as he has 2 kids and fhx of VTE       Hand out on patient education regarding safety while taking oral anticoagulation given   Avoid excessive NSAID use  Limit alcohol intake  Home safety measures to avoid falls. Wearing helmet while on bike. Avoid sports with increased risk of injuries.   ER precautions were discussed.       Electronically signed by Michelle Dang    [facility]      Future Appointments   Date Time Provider Department Center   9/19/2022  9:00 AM Rm Yao MD [id number]           This note was created with voice recognition software.  Grammatical, syntax and spelling errors may be inevitable.

## 2022-09-06 ENCOUNTER — PATIENT MESSAGE (OUTPATIENT)
Dept: HEMATOLOGY/ONCOLOGY | Facility: CLINIC | Age: 42
End: 2022-09-06
Payer: COMMERCIAL

## 2022-09-06 LAB
CARDIOLIPIN IGG SER IA-ACNC: <9.4 GPL (ref 0–14.99)
CARDIOLIPIN IGM SER IA-ACNC: <9.4 MPL (ref 0–12.49)

## 2022-09-07 LAB
B2 GLYCOPROT1 IGA SER QL: <9 SAU
B2 GLYCOPROT1 IGG SER QL: <9 SGU
B2 GLYCOPROT1 IGM SER QL: <9 SMU
F2 C.20210G>A GENO BLD/T: NEGATIVE
F5 P.R506Q BLD/T QL: ABNORMAL

## 2022-09-23 ENCOUNTER — TELEPHONE (OUTPATIENT)
Dept: ADMINISTRATIVE | Facility: OTHER | Age: 42
End: 2022-09-23
Payer: COMMERCIAL

## 2022-09-23 NOTE — TELEPHONE ENCOUNTER
LM for patient to contact our scheduling office to discuss rescheduling Cardiology appointment of 9-19-22. Contact number provided.

## 2022-10-18 ENCOUNTER — OFFICE VISIT (OUTPATIENT)
Dept: HEMATOLOGY/ONCOLOGY | Facility: CLINIC | Age: 42
End: 2022-10-18
Payer: COMMERCIAL

## 2022-10-18 VITALS
RESPIRATION RATE: 18 BRPM | WEIGHT: 315 LBS | DIASTOLIC BLOOD PRESSURE: 90 MMHG | HEIGHT: 71 IN | BODY MASS INDEX: 44.1 KG/M2 | SYSTOLIC BLOOD PRESSURE: 160 MMHG | TEMPERATURE: 98 F | OXYGEN SATURATION: 97 % | HEART RATE: 74 BPM

## 2022-10-18 DIAGNOSIS — E11.9 TYPE 2 DIABETES MELLITUS WITHOUT COMPLICATION, WITHOUT LONG-TERM CURRENT USE OF INSULIN: ICD-10-CM

## 2022-10-18 DIAGNOSIS — I26.99 BILATERAL PULMONARY EMBOLISM: Primary | ICD-10-CM

## 2022-10-18 DIAGNOSIS — Z86.718 HISTORY OF DVT (DEEP VEIN THROMBOSIS): ICD-10-CM

## 2022-10-18 DIAGNOSIS — E66.01 CLASS 3 SEVERE OBESITY DUE TO EXCESS CALORIES WITH SERIOUS COMORBIDITY AND BODY MASS INDEX (BMI) OF 45.0 TO 49.9 IN ADULT: ICD-10-CM

## 2022-10-18 DIAGNOSIS — D68.51 HETEROZYGOUS FACTOR V LEIDEN MUTATION: ICD-10-CM

## 2022-10-18 PROCEDURE — 99999 PR PBB SHADOW E&M-EST. PATIENT-LVL IV: ICD-10-PCS | Mod: PBBFAC,,, | Performed by: STUDENT IN AN ORGANIZED HEALTH CARE EDUCATION/TRAINING PROGRAM

## 2022-10-18 PROCEDURE — 3080F DIAST BP >= 90 MM HG: CPT | Mod: CPTII,S$GLB,, | Performed by: STUDENT IN AN ORGANIZED HEALTH CARE EDUCATION/TRAINING PROGRAM

## 2022-10-18 PROCEDURE — 1160F PR REVIEW ALL MEDS BY PRESCRIBER/CLIN PHARMACIST DOCUMENTED: ICD-10-PCS | Mod: CPTII,S$GLB,, | Performed by: STUDENT IN AN ORGANIZED HEALTH CARE EDUCATION/TRAINING PROGRAM

## 2022-10-18 PROCEDURE — 99999 PR PBB SHADOW E&M-EST. PATIENT-LVL IV: CPT | Mod: PBBFAC,,, | Performed by: STUDENT IN AN ORGANIZED HEALTH CARE EDUCATION/TRAINING PROGRAM

## 2022-10-18 PROCEDURE — 1159F PR MEDICATION LIST DOCUMENTED IN MEDICAL RECORD: ICD-10-PCS | Mod: CPTII,S$GLB,, | Performed by: STUDENT IN AN ORGANIZED HEALTH CARE EDUCATION/TRAINING PROGRAM

## 2022-10-18 PROCEDURE — 3080F PR MOST RECENT DIASTOLIC BLOOD PRESSURE >= 90 MM HG: ICD-10-PCS | Mod: CPTII,S$GLB,, | Performed by: STUDENT IN AN ORGANIZED HEALTH CARE EDUCATION/TRAINING PROGRAM

## 2022-10-18 PROCEDURE — 1159F MED LIST DOCD IN RCRD: CPT | Mod: CPTII,S$GLB,, | Performed by: STUDENT IN AN ORGANIZED HEALTH CARE EDUCATION/TRAINING PROGRAM

## 2022-10-18 PROCEDURE — 99214 PR OFFICE/OUTPT VISIT, EST, LEVL IV, 30-39 MIN: ICD-10-PCS | Mod: S$GLB,,, | Performed by: STUDENT IN AN ORGANIZED HEALTH CARE EDUCATION/TRAINING PROGRAM

## 2022-10-18 PROCEDURE — 99214 OFFICE O/P EST MOD 30 MIN: CPT | Mod: S$GLB,,, | Performed by: STUDENT IN AN ORGANIZED HEALTH CARE EDUCATION/TRAINING PROGRAM

## 2022-10-18 PROCEDURE — 3077F SYST BP >= 140 MM HG: CPT | Mod: CPTII,S$GLB,, | Performed by: STUDENT IN AN ORGANIZED HEALTH CARE EDUCATION/TRAINING PROGRAM

## 2022-10-18 PROCEDURE — 1160F RVW MEDS BY RX/DR IN RCRD: CPT | Mod: CPTII,S$GLB,, | Performed by: STUDENT IN AN ORGANIZED HEALTH CARE EDUCATION/TRAINING PROGRAM

## 2022-10-18 PROCEDURE — 3051F HG A1C>EQUAL 7.0%<8.0%: CPT | Mod: CPTII,S$GLB,, | Performed by: STUDENT IN AN ORGANIZED HEALTH CARE EDUCATION/TRAINING PROGRAM

## 2022-10-18 PROCEDURE — 3077F PR MOST RECENT SYSTOLIC BLOOD PRESSURE >= 140 MM HG: ICD-10-PCS | Mod: CPTII,S$GLB,, | Performed by: STUDENT IN AN ORGANIZED HEALTH CARE EDUCATION/TRAINING PROGRAM

## 2022-10-18 PROCEDURE — 3051F PR MOST RECENT HEMOGLOBIN A1C LEVEL 7.0 - < 8.0%: ICD-10-PCS | Mod: CPTII,S$GLB,, | Performed by: STUDENT IN AN ORGANIZED HEALTH CARE EDUCATION/TRAINING PROGRAM

## 2022-10-18 NOTE — PROGRESS NOTES
Hematology- Oncology Clinic Note :      10/18/2022    RFV / chief complaint- Follow-up and Bilateral pulmonary embolism        HPI  Pt is a 42 y.o. male who  has a past medical history of Asthma, Bilateral pulmonary embolism, Diabetes mellitus, type 2, and DVT (deep venous thrombosis).   Pt presents to the clinic today for DVT/ anticoagulation recs    DVT R leg at the age of 34- was on eliquis  2017 - was taken off eliquis for few months, then   developed DVT in LLE   since then has been on eliquis.   recently had a lapse and was not able to get eliquis due to insurance changes.   Aug 2022- b/l PE   immediate family- mother &7 sibling- none with VTE. father  had DVT after paralysis. maternal aunt passed away from VTE, her 3 daughters have DVT. pt 2 kids    Pt has been tolerating eliquis without any bleeding issues.    Reviewed past medical/surgical/social history    Past Medical History:   Diagnosis Date    Asthma     Bilateral pulmonary embolism     Diabetes mellitus, type 2     DVT (deep venous thrombosis)       Past Surgical History:   Procedure Laterality Date    HERNIA REPAIR      VASECTOMY  4/2015      Review of patient's allergies indicates:  No Known Allergies   Social History     Tobacco Use    Smoking status: Never    Smokeless tobacco: Never   Substance Use Topics    Alcohol use: Yes     Alcohol/week: 2.0 standard drinks     Types: 2 Shots of liquor per week      Family History   Problem Relation Age of Onset    Cancer Mother         breast/lung    Diabetes Sister     Stroke Brother           Review of Systems :  Review of Systems   Constitutional:  Negative for chills, diaphoresis, fever, malaise/fatigue and weight loss.   HENT: Negative.  Negative for congestion, hearing loss, nosebleeds, sore throat and tinnitus.    Eyes: Negative.  Negative for blurred vision and discharge.   Respiratory:  Negative for cough, hemoptysis, sputum production, shortness of breath and wheezing.    Cardiovascular:  "Negative.  Negative for chest pain, palpitations and leg swelling.   Gastrointestinal: Negative.  Negative for abdominal pain, blood in stool, constipation, diarrhea, heartburn, melena, nausea and vomiting.   Genitourinary: Negative.    Musculoskeletal:  Positive for joint pain (R knee pain). Negative for back pain, falls and myalgias.   Skin: Negative.  Negative for itching and rash.   Neurological: Negative.  Negative for dizziness, tingling, sensory change, speech change, focal weakness, seizures, loss of consciousness, weakness and headaches.   Endo/Heme/Allergies: Negative.  Does not bruise/bleed easily.   Psychiatric/Behavioral: Negative.  Negative for depression. The patient is not nervous/anxious and does not have insomnia.              Physical Exam :  BP (!) 160/90 (BP Location: Right arm, Patient Position: Sitting, BP Method: Large (Automatic))   Pulse 74   Temp 98.4 °F (36.9 °C) (Oral)   Resp 18   Ht 5' 11" (1.803 m)   Wt (!) 167.8 kg (369 lb 14.9 oz)   SpO2 97%   BMI 51.60 kg/m²   Wt Readings from Last 3 Encounters:   10/18/22 (!) 167.8 kg (369 lb 14.9 oz)   09/02/22 (!) 164 kg (361 lb 8.9 oz)   08/25/22 (!) 165.1 kg (363 lb 15.7 oz)       Body mass index is 51.6 kg/m².      Physical Exam  Vitals and nursing note reviewed.   Constitutional:       General: He is not in acute distress.     Appearance: He is well-developed.   HENT:      Head: Normocephalic and atraumatic.      Mouth/Throat:      Pharynx: No oropharyngeal exudate.   Eyes:      General: No scleral icterus.        Right eye: No discharge.         Left eye: No discharge.   Neck:      Thyroid: No thyromegaly.      Trachea: No tracheal deviation.   Cardiovascular:      Rate and Rhythm: Normal rate and regular rhythm.      Heart sounds: Normal heart sounds. No murmur heard.  Pulmonary:      Effort: Pulmonary effort is normal. No respiratory distress.      Breath sounds: Normal breath sounds. No wheezing or rales.   Abdominal:      General: " Bowel sounds are normal. There is no distension.      Palpations: Abdomen is soft.      Tenderness: There is no abdominal tenderness.      Hernia: No hernia is present.   Musculoskeletal:         General: No tenderness. Normal range of motion.      Cervical back: Normal range of motion and neck supple.   Skin:     General: Skin is warm and dry.      Findings: No rash.   Neurological:      Mental Status: He is alert and oriented to person, place, and time.      Cranial Nerves: No cranial nerve deficit.      Coordination: Coordination normal.   Psychiatric:         Behavior: Behavior normal.           Current Outpatient Medications   Medication Sig Dispense Refill    albuterol (PROVENTIL/VENTOLIN HFA) 90 mcg/actuation inhaler Inhale 1-2 puffs into the lungs every 4 (four) hours as needed for Shortness of Breath or Wheezing. 8.5 g 0    apixaban (ELIQUIS) 5 mg Tab Take 1 tablet (5 mg total) by mouth 2 (two) times daily. 180 tablet 3    EScitalopram oxalate (LEXAPRO) 5 MG Tab TAKE 1 TABLET(5 MG) BY MOUTH EVERY DAY 30 tablet 1    metFORMIN (GLUCOPHAGE-XR) 500 MG ER 24hr tablet Take 1 tablet (500 mg total) by mouth 2 (two) times daily with meals. 180 tablet 1     No current facility-administered medications for this visit.       Pertinent Diagnostic studies:      No visits with results within 1 Week(s) from this visit.   Latest known visit with results is:   Lab Visit on 09/02/2022   Component Date Value Ref Range Status    Prothrombin (Factor II)  09/02/2022 Negative   Final    Comment: The pathogenic c.*97G>A variant ([id number]) was NOT DETECTED.  The specimen was determined to be homozygous for the wild   type (WT) F2 gene. This test does not rule out other   mutations that may contribute to venous thrombosis.  This test was performed using the annette(R) Factor II Test   (Roche) - an in vitro diagnostic device that uses real-time  quantitative Polymerase Chain Reaction (qPCR) for the   detection and genotyping of the human  Factor II (F2) gene.   The test detects the presence of the wild type (WT) F2 gene  and the pathogenic c.*97G>A variant (also known as [id number])  in genomic DNA isolated from whole blood specimens as an   aid in diagnosing patients with suspected thrombophilia.   The annette(R) Factor II Test and the annette z 480 analyzer are   used together for automated amplification and detection.   The limit of detection for this test is 0.1 ng/uL of   genomic DNA (2.5 ng/PCR reaction).    Test performed at [facility],  [facility] [place]                             [address]     [phone]  Alejo Rojas MD  - Medical Director      Factor V Leiden 09/02/2022 Heterozygous (A)   Final    Comment: Both the wild type (WT) F5 gene and the pathogenic F5   Leiden variant (c.1691G>A) were detected indicating a   heterozygous c.1691G>A genotype for this specimen.   Heterozygosity for the c.1691G>A variant is associated with  activated protein C resistance and an increased risk for   venous thromboembolism (VTE).        Factor V Leiden thrombophilia is inherited in an autosomal  dominant manner and adults heterozygous for the F5 Leiden   variant have a 3 to 8-fold increased risk of VTE. The   prevalence of c.1691G>A heterozygosity is highest in   individuals of  ancestry (5.2%). The variant is   less common in Americans with  (2.2%),    (1.2%),  (<0.5%), and  (1.25%)   ancestry. The overall risk of a first VTE in heterozygous  individuals is approximately 0.5%. Patients heterozygous   for both F5 Leiden and the U40357M (c.*97G>A) prothrombin   variant may experience earlier onset of thrombosis that   tends to be more severe than individual all                           eles. Genetic   counseling is recommended to help determine the benefit of  testing asymptomatic family members.  This test was performed using the annette(R) Factor V Test   (Roche) - an in vitro diagnostic  device that uses   real-time quantitative Polymerase Chain Reaction (qPCR)   for the detection and genotyping of the human factor V   (F5) gene. The test detects the presence of the wild type  (WT) F5 gene and the pathogenic c.1691G>A variant (also   known as the F5 Leiden variant) in genomic DNA isolated   from whole blood specimens as an aid in diagnosing patients  with suspected thrombophilia. The annette(R) Factor V Test and  the annette z 480 analyzer are used together for automated   amplification and detection. The limit of detection for   this test is 0.1 ng/uL of genomic DNA (2.5 ng/PCR   reaction).    Test performed at [facility],  [address], [place]  [id number] [phone]  Alejo Rojas MD  - Medical Director      APA Isotype IgG 09/02/2022 <9.40  0.00 - 14.99 GPL Final    Interpretation: Absent    APA Isotype IgM 09/02/2022 <9.40  0.00 - 12.49 MPL Final    Comment: Interpretation: Absent    Test performed at [facility],  [address], [place]  [id number] [phone]  Alejo Rojas MD  - Medical Director      Beta-2 Glyco 1 IgG 09/02/2022 <9  <=20 SGU Final    Beta-2 Glyco 1 IgM 09/02/2022 <9  <=20 [facility] Final    Beta-2 Glyco 1 IgA 09/02/2022 <9  <=20 KENYA Final    Comment: Test performed at [facility],  [address], [place]  [id number] [phone]  Alejo Rojas MD  - Medical Director             Oncology History    No history exists.     Assessment :   ECOG 1    1. Bilateral pulmonary embolism    2. History of DVT (deep vein thrombosis)    3. Type 2 diabetes mellitus without complication, without long-term current use of insulin    4. Class 3 severe obesity due to excess calories with serious comorbidity and body mass index (BMI) of 45.0 to 49.9 in adult    5. Heterozygous factor V Leiden mutation        Plan :       Unprovoked DVT/PE  DVT R leg at the age of 34- was on eliquis  2017 - was taken off eliquis for few months,  then developed DVT in LLE ; since then has been on eliquis.   recently had a lapse and was not able to get eliquis due to insurance changes.   Aug 2022- b/l PE   CTA 8/15/22- 1. Bilateral pulmonary emboli including distal main and segmental pulmonary arteries on the right and segmental pulmonary arteries on the left.   Continue eliquis lifelong unless contraindicated     Heterozygous FVL- results d/w pt. Printed copy given.   Pt advised to get kids and siblings checked. Both his parents are  but he has some maternal cousins who have issues with blood clots. He will discuss his results with them too.       Hand out on patient education regarding safety while taking oral anticoagulation given   Avoid excessive NSAID use  Limit alcohol intake  Home safety measures to avoid falls. Wearing helmet while on bike. Avoid sports with increased risk of injuries.   ER precautions were discussed.     Morbid obesity- lifestyle modifications d/w      Electronically signed by Michelle Dang    [facility]      No future appointments.          This note was created with voice recognition software.  Grammatical, syntax and spelling errors may be inevitable.

## 2022-11-21 PROBLEM — I26.99 BILATERAL PULMONARY EMBOLISM: Status: RESOLVED | Noted: 2022-08-16 | Resolved: 2022-11-21

## 2022-12-06 ENCOUNTER — PATIENT MESSAGE (OUTPATIENT)
Dept: HEMATOLOGY/ONCOLOGY | Facility: CLINIC | Age: 42
End: 2022-12-06
Payer: COMMERCIAL

## 2022-12-06 DIAGNOSIS — I26.99 BILATERAL PULMONARY EMBOLISM: ICD-10-CM

## 2022-12-09 DIAGNOSIS — I26.99 BILATERAL PULMONARY EMBOLISM: Primary | ICD-10-CM

## 2022-12-12 ENCOUNTER — TELEPHONE (OUTPATIENT)
Dept: HEMATOLOGY/ONCOLOGY | Facility: CLINIC | Age: 42
End: 2022-12-12
Payer: COMMERCIAL

## 2022-12-12 NOTE — TELEPHONE ENCOUNTER
Returned call and spoke with Mr. Devries regarding his Eloquis. Dr. Dang has prescribed an alternative and sent it to his pharmacy.  Mr. Devries states that [facility] will fax information to us so the medication can be delivered and the cost will be lower. Will check up on the fax and return his call with update information.    ----- Message from Michelle Dang MD sent at 12/9/2022  9:22 PM CST -----  Hi   Pt needs blood thinners for life.   He was eliquis which is no longer being covered or has high copay.   I have send prescription for xarelto (alternative) and hope the insurance covers it. If not then he would need to have some sort of financial assistance program help.   Breann Lora as well.   Thanks.

## 2023-01-09 ENCOUNTER — PATIENT MESSAGE (OUTPATIENT)
Dept: HEMATOLOGY/ONCOLOGY | Facility: CLINIC | Age: 43
End: 2023-01-09
Payer: COMMERCIAL

## 2023-01-10 ENCOUNTER — PATIENT MESSAGE (OUTPATIENT)
Dept: INTERNAL MEDICINE | Facility: CLINIC | Age: 43
End: 2023-01-10
Payer: COMMERCIAL

## 2023-01-10 DIAGNOSIS — R35.0 URINARY FREQUENCY: Primary | ICD-10-CM

## 2023-01-19 ENCOUNTER — PATIENT MESSAGE (OUTPATIENT)
Dept: INTERNAL MEDICINE | Facility: CLINIC | Age: 43
End: 2023-01-19
Payer: COMMERCIAL

## 2023-01-19 ENCOUNTER — LAB VISIT (OUTPATIENT)
Dept: LAB | Facility: HOSPITAL | Age: 43
End: 2023-01-19
Payer: COMMERCIAL

## 2023-01-19 DIAGNOSIS — R35.0 URINARY FREQUENCY: ICD-10-CM

## 2023-01-19 LAB
BACTERIA #/AREA URNS AUTO: NORMAL /HPF
BILIRUB UR QL STRIP: NEGATIVE
CLARITY UR REFRACT.AUTO: CLEAR
COLOR UR AUTO: COLORLESS
GLUCOSE UR QL STRIP: ABNORMAL
HGB UR QL STRIP: NEGATIVE
KETONES UR QL STRIP: ABNORMAL
LEUKOCYTE ESTERASE UR QL STRIP: NEGATIVE
MICROSCOPIC COMMENT: NORMAL
NITRITE UR QL STRIP: NEGATIVE
PH UR STRIP: 5 [PH] (ref 5–8)
PROT UR QL STRIP: NEGATIVE
RBC #/AREA URNS AUTO: 0 /HPF (ref 0–4)
SP GR UR STRIP: >1.03 (ref 1–1.03)
URN SPEC COLLECT METH UR: ABNORMAL
WBC #/AREA URNS AUTO: 0 /HPF (ref 0–5)
YEAST UR QL AUTO: NORMAL

## 2023-01-19 PROCEDURE — 87086 URINE CULTURE/COLONY COUNT: CPT | Performed by: PHYSICIAN ASSISTANT

## 2023-01-19 PROCEDURE — 81001 URINALYSIS AUTO W/SCOPE: CPT | Performed by: PHYSICIAN ASSISTANT

## 2023-01-20 ENCOUNTER — PATIENT MESSAGE (OUTPATIENT)
Dept: INTERNAL MEDICINE | Facility: CLINIC | Age: 43
End: 2023-01-20

## 2023-01-20 ENCOUNTER — OFFICE VISIT (OUTPATIENT)
Dept: INTERNAL MEDICINE | Facility: CLINIC | Age: 43
End: 2023-01-20
Payer: COMMERCIAL

## 2023-01-20 ENCOUNTER — LAB VISIT (OUTPATIENT)
Dept: LAB | Facility: HOSPITAL | Age: 43
End: 2023-01-20
Payer: COMMERCIAL

## 2023-01-20 VITALS
WEIGHT: 315 LBS | HEIGHT: 71 IN | OXYGEN SATURATION: 96 % | DIASTOLIC BLOOD PRESSURE: 90 MMHG | SYSTOLIC BLOOD PRESSURE: 124 MMHG | BODY MASS INDEX: 44.1 KG/M2 | HEART RATE: 91 BPM

## 2023-01-20 DIAGNOSIS — E11.9 TYPE 2 DIABETES MELLITUS WITHOUT COMPLICATION, WITHOUT LONG-TERM CURRENT USE OF INSULIN: ICD-10-CM

## 2023-01-20 DIAGNOSIS — E11.65 TYPE 2 DIABETES MELLITUS WITH HYPERGLYCEMIA, WITHOUT LONG-TERM CURRENT USE OF INSULIN: Primary | ICD-10-CM

## 2023-01-20 DIAGNOSIS — E11.65 TYPE 2 DIABETES MELLITUS WITH HYPERGLYCEMIA, WITHOUT LONG-TERM CURRENT USE OF INSULIN: ICD-10-CM

## 2023-01-20 LAB
ALBUMIN/CREAT UR: 14.1 UG/MG (ref 0–30)
BACTERIA UR CULT: NO GROWTH
CREAT UR-MCNC: 92 MG/DL (ref 23–375)
ESTIMATED AVG GLUCOSE: 280 MG/DL (ref 68–131)
GLUCOSE SERPL-MCNC: 295 MG/DL (ref 70–110)
HBA1C MFR BLD: 11.4 % (ref 4–5.6)
MICROALBUMIN UR DL<=1MG/L-MCNC: 13 UG/ML

## 2023-01-20 PROCEDURE — 3080F PR MOST RECENT DIASTOLIC BLOOD PRESSURE >= 90 MM HG: ICD-10-PCS | Mod: CPTII,S$GLB,, | Performed by: PHYSICIAN ASSISTANT

## 2023-01-20 PROCEDURE — 1160F RVW MEDS BY RX/DR IN RCRD: CPT | Mod: CPTII,S$GLB,, | Performed by: PHYSICIAN ASSISTANT

## 2023-01-20 PROCEDURE — 3074F SYST BP LT 130 MM HG: CPT | Mod: CPTII,S$GLB,, | Performed by: PHYSICIAN ASSISTANT

## 2023-01-20 PROCEDURE — 3080F DIAST BP >= 90 MM HG: CPT | Mod: CPTII,S$GLB,, | Performed by: PHYSICIAN ASSISTANT

## 2023-01-20 PROCEDURE — 36415 COLL VENOUS BLD VENIPUNCTURE: CPT | Performed by: PHYSICIAN ASSISTANT

## 2023-01-20 PROCEDURE — 99214 OFFICE O/P EST MOD 30 MIN: CPT | Mod: S$GLB,,, | Performed by: PHYSICIAN ASSISTANT

## 2023-01-20 PROCEDURE — 3008F BODY MASS INDEX DOCD: CPT | Mod: CPTII,S$GLB,, | Performed by: PHYSICIAN ASSISTANT

## 2023-01-20 PROCEDURE — 1159F PR MEDICATION LIST DOCUMENTED IN MEDICAL RECORD: ICD-10-PCS | Mod: CPTII,S$GLB,, | Performed by: PHYSICIAN ASSISTANT

## 2023-01-20 PROCEDURE — 82570 ASSAY OF URINE CREATININE: CPT | Performed by: PHYSICIAN ASSISTANT

## 2023-01-20 PROCEDURE — 1159F MED LIST DOCD IN RCRD: CPT | Mod: CPTII,S$GLB,, | Performed by: PHYSICIAN ASSISTANT

## 2023-01-20 PROCEDURE — 82962 GLUCOSE BLOOD TEST: CPT | Mod: S$GLB,,, | Performed by: PHYSICIAN ASSISTANT

## 2023-01-20 PROCEDURE — 83036 HEMOGLOBIN GLYCOSYLATED A1C: CPT | Performed by: PHYSICIAN ASSISTANT

## 2023-01-20 PROCEDURE — 1160F PR REVIEW ALL MEDS BY PRESCRIBER/CLIN PHARMACIST DOCUMENTED: ICD-10-PCS | Mod: CPTII,S$GLB,, | Performed by: PHYSICIAN ASSISTANT

## 2023-01-20 PROCEDURE — 99999 PR PBB SHADOW E&M-EST. PATIENT-LVL IV: CPT | Mod: PBBFAC,,, | Performed by: PHYSICIAN ASSISTANT

## 2023-01-20 PROCEDURE — 99999 PR PBB SHADOW E&M-EST. PATIENT-LVL IV: ICD-10-PCS | Mod: PBBFAC,,, | Performed by: PHYSICIAN ASSISTANT

## 2023-01-20 PROCEDURE — 3074F PR MOST RECENT SYSTOLIC BLOOD PRESSURE < 130 MM HG: ICD-10-PCS | Mod: CPTII,S$GLB,, | Performed by: PHYSICIAN ASSISTANT

## 2023-01-20 PROCEDURE — 3008F PR BODY MASS INDEX (BMI) DOCUMENTED: ICD-10-PCS | Mod: CPTII,S$GLB,, | Performed by: PHYSICIAN ASSISTANT

## 2023-01-20 PROCEDURE — 99214 PR OFFICE/OUTPT VISIT, EST, LEVL IV, 30-39 MIN: ICD-10-PCS | Mod: S$GLB,,, | Performed by: PHYSICIAN ASSISTANT

## 2023-01-20 PROCEDURE — 82962 POCT GLUCOSE, HAND-HELD DEVICE: ICD-10-PCS | Mod: S$GLB,,, | Performed by: PHYSICIAN ASSISTANT

## 2023-01-20 RX ORDER — INSULIN PUMP SYRINGE, 3 ML
EACH MISCELLANEOUS
Qty: 1 EACH | Refills: 0 | Status: SHIPPED | OUTPATIENT
Start: 2023-01-20

## 2023-01-20 RX ORDER — LANCETS
EACH MISCELLANEOUS
Qty: 100 EACH | Refills: 3 | Status: SHIPPED | OUTPATIENT
Start: 2023-01-20 | End: 2024-01-24 | Stop reason: SDUPTHER

## 2023-01-20 RX ORDER — SEMAGLUTIDE 1.34 MG/ML
0.25 INJECTION, SOLUTION SUBCUTANEOUS
Qty: 1 PEN | Refills: 5 | Status: SHIPPED | OUTPATIENT
Start: 2023-01-20 | End: 2023-04-12 | Stop reason: SDUPTHER

## 2023-01-20 NOTE — PROGRESS NOTES
Subjective:       Patient ID: Hany Devries is a 42 y.o. male.    Chief Complaint: Diabetes    Here to follow up DM.     Noticed urinary freq and increased thirst over the past several weeks. Urina with 4+ glucose. He reports unhealthy eating during the holidays, more sodas and sweets. He also noticed he was only taking metformin once a day instead of BID as prescribed. Interested in GLP1 for weight and better DM control.     Diabetes  He presents for his follow-up diabetic visit. He has type 2 diabetes mellitus. His disease course has been fluctuating. There are no hypoglycemic associated symptoms. Pertinent negatives for hypoglycemia include no headaches. Associated symptoms include blurred vision, fatigue, polydipsia, polyphagia and polyuria. Pertinent negatives for diabetes include no chest pain and no weakness. There are no hypoglycemic complications. Symptoms are improving. Diabetic complications include impotence. Current diabetic treatment includes oral agent (monotherapy). He is compliant with treatment most of the time.       Past Medical History:   Diagnosis Date    Asthma     Bilateral pulmonary embolism     Diabetes mellitus, type 2     DVT (deep venous thrombosis)      Social History     Tobacco Use    Smoking status: Never    Smokeless tobacco: Never   Substance Use Topics    Alcohol use: Yes     Alcohol/week: 2.0 standard drinks     Types: 2 Shots of liquor per week    Drug use: Never     Review of patient's allergies indicates:  No Known Allergies        Review of Systems   Constitutional:  Positive for fatigue. Negative for chills, diaphoresis and unexpected weight change.   Eyes:  Positive for blurred vision.   Respiratory:  Negative for cough and shortness of breath.    Cardiovascular:  Negative for chest pain and leg swelling.   Endocrine: Positive for polydipsia, polyphagia and polyuria.   Genitourinary:  Positive for impotence.   Integumentary:  Negative for rash.   Neurological:  Negative for  "weakness and headaches.       Objective: BP (!) 124/90 (BP Location: Left arm, Patient Position: Sitting, BP Method: Large (Manual))   Pulse 91   Ht 5' 11" (1.803 m)   Wt (!) 160.3 kg (353 lb 6.4 oz)   SpO2 96%   BMI 49.29 kg/m²         Physical Exam  Vitals reviewed.   Constitutional:       General: He is not in acute distress.     Appearance: He is well-developed.   HENT:      Head: Normocephalic and atraumatic.   Cardiovascular:      Rate and Rhythm: Normal rate and regular rhythm.      Pulses:           Dorsalis pedis pulses are 2+ on the right side and 2+ on the left side.        Posterior tibial pulses are 2+ on the right side and 2+ on the left side.      Heart sounds: No murmur heard.  Pulmonary:      Effort: Pulmonary effort is normal.      Breath sounds: Normal breath sounds. No wheezing or rales.   Abdominal:      General: Bowel sounds are normal.      Palpations: Abdomen is soft.      Tenderness: There is no abdominal tenderness.   Feet:      Right foot:      Protective Sensation: 9 sites tested.  9 sites sensed.      Skin integrity: No ulcer, blister or skin breakdown.      Toenail Condition: Right toenails are normal.      Left foot:      Protective Sensation: 9 sites tested.  9 sites sensed.      Skin integrity: No ulcer, blister or skin breakdown.      Toenail Condition: Left toenails are normal.   Skin:     General: Skin is warm and dry.      Findings: No rash.   Neurological:      Mental Status: He is alert and oriented to person, place, and time.       Assessment:       Problem List Items Addressed This Visit          Endocrine    Type 2 diabetes mellitus without complication, without long-term current use of insulin - Primary    Relevant Medications    semaglutide (OZEMPIC) 0.25 mg or 0.5 mg(2 mg/1.5 mL) pen injector    blood-glucose meter kit    lancets Misc    blood sugar diagnostic Strp         Plan:       Hany was seen today for diabetes.    Diagnoses and all orders for this " visit:    Type 2 diabetes mellitus with hyperglycemia, without long-term current use of insulin  -     MICROALBUMIN / CREATININE RATIO URINE  -     Ambulatory referral/consult to Ophthalmology; Future  -     Hemoglobin A1C; Future  -     POCT Glucose, Hand-Held Device  -     semaglutide (OZEMPIC) 0.25 mg or 0.5 mg(2 mg/1.5 mL) pen injector; Inject 0.25 mg into the skin every 7 days.  -     blood-glucose meter kit; One insurance preferred glucometer to monitor glucose daily  -     lancets Misc; To check BG 1 times daily, to use with insurance preferred meter  -     blood sugar diagnostic Strp; To check BG 1 times daily, to use with insurance preferred meter    Gluc 295 today in clinic  Resume metformin 500mg BID  Start Ozempic 0.25mg (med profiled discussed)  Follow up A1c for further med adjustment in necessary  BG goals and lifestyle mods reviewed  Schedule Eye exam.         Lauren Pool PA-C

## 2023-01-23 ENCOUNTER — PATIENT MESSAGE (OUTPATIENT)
Dept: INTERNAL MEDICINE | Facility: CLINIC | Age: 43
End: 2023-01-23
Payer: COMMERCIAL

## 2023-01-23 DIAGNOSIS — E11.65 TYPE 2 DIABETES MELLITUS WITH HYPERGLYCEMIA, WITHOUT LONG-TERM CURRENT USE OF INSULIN: Primary | ICD-10-CM

## 2023-01-23 RX ORDER — METFORMIN HYDROCHLORIDE 500 MG/1
1000 TABLET, EXTENDED RELEASE ORAL 2 TIMES DAILY WITH MEALS
Qty: 360 TABLET | Refills: 3 | Status: SHIPPED | OUTPATIENT
Start: 2023-01-23 | End: 2023-03-08 | Stop reason: SDUPTHER

## 2023-01-23 NOTE — TELEPHONE ENCOUNTER
""A1c is quite HIGH. Lets continue the plan of starting Ozempic, increasing Metformin to twice a day and being diligent with dietary changes(no sodas, sweets, reduce carbs, continue exercise). I recommend to repeat A1c in 3 months. Please let me know when you start the Ozempic. Let me know of any questions."  - ANA Pool from most recent A1C lab     Pt currently taking Metformin 500 twice daily. Does this need to be increased? Originally prescribed on 8/25/22.    Please advise   "

## 2023-01-24 NOTE — TELEPHONE ENCOUNTER
Pt has yet to find a pharmacy with Ozempic 2.5    Should pt's rx strength be changed to facilitate ease of search? Ozempic still on national back order.

## 2023-01-25 NOTE — TELEPHONE ENCOUNTER
Instructions for Rybelsus  3 mg for 1 month then go to 7 mg  remember 4 oz of water or less, empty stomach, wait 30-40 mins to eat/drink anything else

## 2023-01-26 ENCOUNTER — PATIENT MESSAGE (OUTPATIENT)
Dept: INTERNAL MEDICINE | Facility: CLINIC | Age: 43
End: 2023-01-26
Payer: COMMERCIAL

## 2023-01-26 RX ORDER — ORAL SEMAGLUTIDE 3 MG/1
3 TABLET ORAL DAILY
Qty: 30 TABLET | Refills: 1 | Status: SHIPPED | OUTPATIENT
Start: 2023-01-26 | End: 2023-01-26

## 2023-01-26 RX ORDER — ORAL SEMAGLUTIDE 3 MG/1
3 TABLET ORAL DAILY
Qty: 30 TABLET | Refills: 0 | Status: SHIPPED | OUTPATIENT
Start: 2023-01-26 | End: 2023-02-08 | Stop reason: SDUPTHER

## 2023-01-26 RX ORDER — ORAL SEMAGLUTIDE 7 MG/1
7 TABLET ORAL DAILY
Qty: 30 TABLET | Refills: 0 | Status: SHIPPED | OUTPATIENT
Start: 2023-02-27 | End: 2023-02-08 | Stop reason: SDUPTHER

## 2023-02-08 ENCOUNTER — TELEPHONE (OUTPATIENT)
Dept: INTERNAL MEDICINE | Facility: CLINIC | Age: 43
End: 2023-02-08
Payer: COMMERCIAL

## 2023-02-08 ENCOUNTER — PATIENT MESSAGE (OUTPATIENT)
Dept: INTERNAL MEDICINE | Facility: CLINIC | Age: 43
End: 2023-02-08
Payer: COMMERCIAL

## 2023-02-08 DIAGNOSIS — E11.65 TYPE 2 DIABETES MELLITUS WITH HYPERGLYCEMIA, WITHOUT LONG-TERM CURRENT USE OF INSULIN: ICD-10-CM

## 2023-02-08 RX ORDER — ORAL SEMAGLUTIDE 7 MG/1
7 TABLET ORAL DAILY
Qty: 90 TABLET | Refills: 0 | Status: SHIPPED | OUTPATIENT
Start: 2023-02-27 | End: 2023-04-13 | Stop reason: ALTCHOICE

## 2023-02-08 RX ORDER — ORAL SEMAGLUTIDE 3 MG/1
3 TABLET ORAL DAILY
Qty: 30 TABLET | Refills: 0 | Status: SHIPPED | OUTPATIENT
Start: 2023-02-08 | End: 2023-04-13 | Stop reason: ALTCHOICE

## 2023-02-08 NOTE — TELEPHONE ENCOUNTER
Pt [facility]    [facility] needed for Rybelsus with [facility]     Rx pended

## 2023-02-08 NOTE — TELEPHONE ENCOUNTER
PA for Rybelsus 3MG and 7mg tablets started   See copied reply below stating medication covered      Hany Devries (Key: [id number])  Rybelsus 3MG tablets  Message from Plan  Drug is covered by current benefit plan. No further PA activity needed            Hany Devries (Jeter: [id number])  Rybelsus 7MG tablets  Determination  Message from Plan  Drug is covered by current benefit plan. No further PA activity needed

## 2023-04-12 DIAGNOSIS — E11.65 TYPE 2 DIABETES MELLITUS WITH HYPERGLYCEMIA, WITHOUT LONG-TERM CURRENT USE OF INSULIN: ICD-10-CM

## 2023-04-13 RX ORDER — SEMAGLUTIDE 1.34 MG/ML
0.5 INJECTION, SOLUTION SUBCUTANEOUS
Qty: 1 EACH | Refills: 1 | Status: SHIPPED | OUTPATIENT
Start: 2023-04-13 | End: 2023-05-23

## 2023-04-14 ENCOUNTER — PATIENT MESSAGE (OUTPATIENT)
Dept: INTERNAL MEDICINE | Facility: CLINIC | Age: 43
End: 2023-04-14
Payer: COMMERCIAL

## 2023-04-21 ENCOUNTER — LAB VISIT (OUTPATIENT)
Dept: LAB | Facility: HOSPITAL | Age: 43
End: 2023-04-21
Payer: COMMERCIAL

## 2023-04-21 DIAGNOSIS — E11.65 TYPE 2 DIABETES MELLITUS WITH HYPERGLYCEMIA, WITHOUT LONG-TERM CURRENT USE OF INSULIN: ICD-10-CM

## 2023-04-21 LAB
ESTIMATED AVG GLUCOSE: ABNORMAL MG/DL (ref 68–131)
HBA1C MFR BLD: >14 % (ref 4–5.6)

## 2023-04-21 PROCEDURE — 83036 HEMOGLOBIN GLYCOSYLATED A1C: CPT | Performed by: PHYSICIAN ASSISTANT

## 2023-04-21 PROCEDURE — 36415 COLL VENOUS BLD VENIPUNCTURE: CPT | Performed by: PHYSICIAN ASSISTANT

## 2023-04-25 ENCOUNTER — PATIENT MESSAGE (OUTPATIENT)
Dept: INTERNAL MEDICINE | Facility: CLINIC | Age: 43
End: 2023-04-25
Payer: COMMERCIAL

## 2023-04-25 DIAGNOSIS — E11.65 UNCONTROLLED TYPE 2 DIABETES MELLITUS WITH HYPERGLYCEMIA: Primary | ICD-10-CM

## 2023-04-26 NOTE — TELEPHONE ENCOUNTER
PA for Ozempic started and approved  Please see copy from plan below:      Hany Devries (Key: [id number])  Ozempic (0.25 or 0.5 MG/DOSE) 2MG/1.5ML pen-injectors  [facility] Electronic PA Form (2017 NCPDP)  Sent to Plan today  Determination Message from Plan  This request has been approved using information available on the patient's profile. CaseId:[id number];Status:Approved;Review Type:Prior Auth;Coverage Start Date:03/27/2023;Coverage End Date:04/25/2024;

## 2023-05-05 NOTE — TELEPHONE ENCOUNTER
Please help schedule appt with NP Roche within the next 1 to 2 weeks for uncontrolled diabetes follow up.   If NP Roche unavailable please let me know.   Please help schedule appt with MD (Michael Hennessy) to fully establish care as aixa.   Lauren Pool PA-C

## 2023-05-22 ENCOUNTER — LAB VISIT (OUTPATIENT)
Dept: LAB | Facility: HOSPITAL | Age: 43
End: 2023-05-22
Attending: STUDENT IN AN ORGANIZED HEALTH CARE EDUCATION/TRAINING PROGRAM
Payer: COMMERCIAL

## 2023-05-22 DIAGNOSIS — I26.99 BILATERAL PULMONARY EMBOLISM: ICD-10-CM

## 2023-05-22 LAB
ALBUMIN SERPL BCP-MCNC: 3.8 G/DL (ref 3.5–5.2)
ALP SERPL-CCNC: 60 U/L (ref 55–135)
ALT SERPL W/O P-5'-P-CCNC: 23 U/L (ref 10–44)
ANION GAP SERPL CALC-SCNC: 9 MMOL/L (ref 8–16)
AST SERPL-CCNC: 20 U/L (ref 10–40)
BASOPHILS # BLD AUTO: 0.03 K/UL (ref 0–0.2)
BASOPHILS NFR BLD: 0.4 % (ref 0–1.9)
BILIRUB SERPL-MCNC: 0.6 MG/DL (ref 0.1–1)
BUN SERPL-MCNC: 7 MG/DL (ref 6–20)
CALCIUM SERPL-MCNC: 9.7 MG/DL (ref 8.7–10.5)
CHLORIDE SERPL-SCNC: 102 MMOL/L (ref 95–110)
CO2 SERPL-SCNC: 25 MMOL/L (ref 23–29)
CREAT SERPL-MCNC: 1 MG/DL (ref 0.5–1.4)
DIFFERENTIAL METHOD: ABNORMAL
EOSINOPHIL # BLD AUTO: 0.2 K/UL (ref 0–0.5)
EOSINOPHIL NFR BLD: 2.4 % (ref 0–8)
ERYTHROCYTE [DISTWIDTH] IN BLOOD BY AUTOMATED COUNT: 14 % (ref 11.5–14.5)
EST. GFR  (NO RACE VARIABLE): >60 ML/MIN/1.73 M^2
GLUCOSE SERPL-MCNC: 372 MG/DL (ref 70–110)
HCT VFR BLD AUTO: 41.7 % (ref 40–54)
HGB BLD-MCNC: 12.8 G/DL (ref 14–18)
IMM GRANULOCYTES # BLD AUTO: 0.01 K/UL (ref 0–0.04)
IMM GRANULOCYTES NFR BLD AUTO: 0.1 % (ref 0–0.5)
LYMPHOCYTES # BLD AUTO: 1.7 K/UL (ref 1–4.8)
LYMPHOCYTES NFR BLD: 21.3 % (ref 18–48)
MCH RBC QN AUTO: 25.5 PG (ref 27–31)
MCHC RBC AUTO-ENTMCNC: 30.7 G/DL (ref 32–36)
MCV RBC AUTO: 83 FL (ref 82–98)
MONOCYTES # BLD AUTO: 0.6 K/UL (ref 0.3–1)
MONOCYTES NFR BLD: 7.1 % (ref 4–15)
NEUTROPHILS # BLD AUTO: 5.4 K/UL (ref 1.8–7.7)
NEUTROPHILS NFR BLD: 68.7 % (ref 38–73)
NRBC BLD-RTO: 0 /100 WBC
PLATELET # BLD AUTO: 209 K/UL (ref 150–450)
PMV BLD AUTO: 12.3 FL (ref 9.2–12.9)
POTASSIUM SERPL-SCNC: 4.4 MMOL/L (ref 3.5–5.1)
PROT SERPL-MCNC: 7.4 G/DL (ref 6–8.4)
RBC # BLD AUTO: 5.02 M/UL (ref 4.6–6.2)
SODIUM SERPL-SCNC: 136 MMOL/L (ref 136–145)
WBC # BLD AUTO: 7.85 K/UL (ref 3.9–12.7)

## 2023-05-22 PROCEDURE — 80053 COMPREHEN METABOLIC PANEL: CPT | Performed by: STUDENT IN AN ORGANIZED HEALTH CARE EDUCATION/TRAINING PROGRAM

## 2023-05-22 PROCEDURE — 85025 COMPLETE CBC W/AUTO DIFF WBC: CPT | Performed by: STUDENT IN AN ORGANIZED HEALTH CARE EDUCATION/TRAINING PROGRAM

## 2023-05-22 PROCEDURE — 36415 COLL VENOUS BLD VENIPUNCTURE: CPT | Performed by: STUDENT IN AN ORGANIZED HEALTH CARE EDUCATION/TRAINING PROGRAM

## 2023-05-23 ENCOUNTER — OFFICE VISIT (OUTPATIENT)
Dept: INTERNAL MEDICINE | Facility: CLINIC | Age: 43
End: 2023-05-23
Payer: COMMERCIAL

## 2023-05-23 ENCOUNTER — TELEPHONE (OUTPATIENT)
Dept: DIABETES | Facility: CLINIC | Age: 43
End: 2023-05-23
Payer: COMMERCIAL

## 2023-05-23 VITALS
TEMPERATURE: 99 F | WEIGHT: 302.69 LBS | HEART RATE: 79 BPM | DIASTOLIC BLOOD PRESSURE: 80 MMHG | BODY MASS INDEX: 42.38 KG/M2 | OXYGEN SATURATION: 99 % | SYSTOLIC BLOOD PRESSURE: 120 MMHG | HEIGHT: 71 IN

## 2023-05-23 DIAGNOSIS — E11.9 TYPE 2 DIABETES MELLITUS WITHOUT COMPLICATION, WITHOUT LONG-TERM CURRENT USE OF INSULIN: ICD-10-CM

## 2023-05-23 DIAGNOSIS — Z76.89 ESTABLISHING CARE WITH NEW DOCTOR, ENCOUNTER FOR: Primary | ICD-10-CM

## 2023-05-23 DIAGNOSIS — I82.593 CHRONIC DEEP VEIN THROMBOSIS (DVT) OF OTHER VEIN OF BOTH LOWER EXTREMITIES: ICD-10-CM

## 2023-05-23 DIAGNOSIS — Z86.711 PERSONAL HISTORY OF PULMONARY EMBOLISM: ICD-10-CM

## 2023-05-23 DIAGNOSIS — D68.51 HETEROZYGOUS FACTOR V LEIDEN MUTATION: ICD-10-CM

## 2023-05-23 DIAGNOSIS — E66.01 CLASS 3 SEVERE OBESITY DUE TO EXCESS CALORIES WITH SERIOUS COMORBIDITY AND BODY MASS INDEX (BMI) OF 40.0 TO 44.9 IN ADULT: ICD-10-CM

## 2023-05-23 PROCEDURE — 3046F HEMOGLOBIN A1C LEVEL >9.0%: CPT | Mod: CPTII,S$GLB,, | Performed by: STUDENT IN AN ORGANIZED HEALTH CARE EDUCATION/TRAINING PROGRAM

## 2023-05-23 PROCEDURE — 1160F PR REVIEW ALL MEDS BY PRESCRIBER/CLIN PHARMACIST DOCUMENTED: ICD-10-PCS | Mod: CPTII,S$GLB,, | Performed by: STUDENT IN AN ORGANIZED HEALTH CARE EDUCATION/TRAINING PROGRAM

## 2023-05-23 PROCEDURE — 3008F PR BODY MASS INDEX (BMI) DOCUMENTED: ICD-10-PCS | Mod: CPTII,S$GLB,, | Performed by: STUDENT IN AN ORGANIZED HEALTH CARE EDUCATION/TRAINING PROGRAM

## 2023-05-23 PROCEDURE — 3066F NEPHROPATHY DOC TX: CPT | Mod: CPTII,S$GLB,, | Performed by: STUDENT IN AN ORGANIZED HEALTH CARE EDUCATION/TRAINING PROGRAM

## 2023-05-23 PROCEDURE — 99999 PR PBB SHADOW E&M-EST. PATIENT-LVL III: ICD-10-PCS | Mod: PBBFAC,,, | Performed by: STUDENT IN AN ORGANIZED HEALTH CARE EDUCATION/TRAINING PROGRAM

## 2023-05-23 PROCEDURE — 3074F SYST BP LT 130 MM HG: CPT | Mod: CPTII,S$GLB,, | Performed by: STUDENT IN AN ORGANIZED HEALTH CARE EDUCATION/TRAINING PROGRAM

## 2023-05-23 PROCEDURE — 90677 PCV20 VACCINE IM: CPT | Mod: S$GLB,,, | Performed by: STUDENT IN AN ORGANIZED HEALTH CARE EDUCATION/TRAINING PROGRAM

## 2023-05-23 PROCEDURE — 90471 PNEUMOCOCCAL CONJUGATE VACCINE 20-VALENT: ICD-10-PCS | Mod: S$GLB,,, | Performed by: STUDENT IN AN ORGANIZED HEALTH CARE EDUCATION/TRAINING PROGRAM

## 2023-05-23 PROCEDURE — 1160F RVW MEDS BY RX/DR IN RCRD: CPT | Mod: CPTII,S$GLB,, | Performed by: STUDENT IN AN ORGANIZED HEALTH CARE EDUCATION/TRAINING PROGRAM

## 2023-05-23 PROCEDURE — 90471 IMMUNIZATION ADMIN: CPT | Mod: S$GLB,,, | Performed by: STUDENT IN AN ORGANIZED HEALTH CARE EDUCATION/TRAINING PROGRAM

## 2023-05-23 PROCEDURE — 90677 PNEUMOCOCCAL CONJUGATE VACCINE 20-VALENT: ICD-10-PCS | Mod: S$GLB,,, | Performed by: STUDENT IN AN ORGANIZED HEALTH CARE EDUCATION/TRAINING PROGRAM

## 2023-05-23 PROCEDURE — 99214 OFFICE O/P EST MOD 30 MIN: CPT | Mod: 25,S$GLB,, | Performed by: STUDENT IN AN ORGANIZED HEALTH CARE EDUCATION/TRAINING PROGRAM

## 2023-05-23 PROCEDURE — 3066F PR DOCUMENTATION OF TREATMENT FOR NEPHROPATHY: ICD-10-PCS | Mod: CPTII,S$GLB,, | Performed by: STUDENT IN AN ORGANIZED HEALTH CARE EDUCATION/TRAINING PROGRAM

## 2023-05-23 PROCEDURE — 3008F BODY MASS INDEX DOCD: CPT | Mod: CPTII,S$GLB,, | Performed by: STUDENT IN AN ORGANIZED HEALTH CARE EDUCATION/TRAINING PROGRAM

## 2023-05-23 PROCEDURE — 1159F PR MEDICATION LIST DOCUMENTED IN MEDICAL RECORD: ICD-10-PCS | Mod: CPTII,S$GLB,, | Performed by: STUDENT IN AN ORGANIZED HEALTH CARE EDUCATION/TRAINING PROGRAM

## 2023-05-23 PROCEDURE — 3074F PR MOST RECENT SYSTOLIC BLOOD PRESSURE < 130 MM HG: ICD-10-PCS | Mod: CPTII,S$GLB,, | Performed by: STUDENT IN AN ORGANIZED HEALTH CARE EDUCATION/TRAINING PROGRAM

## 2023-05-23 PROCEDURE — 3061F NEG MICROALBUMINURIA REV: CPT | Mod: CPTII,S$GLB,, | Performed by: STUDENT IN AN ORGANIZED HEALTH CARE EDUCATION/TRAINING PROGRAM

## 2023-05-23 PROCEDURE — 3046F PR MOST RECENT HEMOGLOBIN A1C LEVEL > 9.0%: ICD-10-PCS | Mod: CPTII,S$GLB,, | Performed by: STUDENT IN AN ORGANIZED HEALTH CARE EDUCATION/TRAINING PROGRAM

## 2023-05-23 PROCEDURE — 99214 PR OFFICE/OUTPT VISIT, EST, LEVL IV, 30-39 MIN: ICD-10-PCS | Mod: 25,S$GLB,, | Performed by: STUDENT IN AN ORGANIZED HEALTH CARE EDUCATION/TRAINING PROGRAM

## 2023-05-23 PROCEDURE — 3079F PR MOST RECENT DIASTOLIC BLOOD PRESSURE 80-89 MM HG: ICD-10-PCS | Mod: CPTII,S$GLB,, | Performed by: STUDENT IN AN ORGANIZED HEALTH CARE EDUCATION/TRAINING PROGRAM

## 2023-05-23 PROCEDURE — 1159F MED LIST DOCD IN RCRD: CPT | Mod: CPTII,S$GLB,, | Performed by: STUDENT IN AN ORGANIZED HEALTH CARE EDUCATION/TRAINING PROGRAM

## 2023-05-23 PROCEDURE — 99999 PR PBB SHADOW E&M-EST. PATIENT-LVL III: CPT | Mod: PBBFAC,,, | Performed by: STUDENT IN AN ORGANIZED HEALTH CARE EDUCATION/TRAINING PROGRAM

## 2023-05-23 PROCEDURE — 3079F DIAST BP 80-89 MM HG: CPT | Mod: CPTII,S$GLB,, | Performed by: STUDENT IN AN ORGANIZED HEALTH CARE EDUCATION/TRAINING PROGRAM

## 2023-05-23 PROCEDURE — 3061F PR NEG MICROALBUMINURIA RESULT DOCUMENTED/REVIEW: ICD-10-PCS | Mod: CPTII,S$GLB,, | Performed by: STUDENT IN AN ORGANIZED HEALTH CARE EDUCATION/TRAINING PROGRAM

## 2023-05-23 RX ORDER — ORAL SEMAGLUTIDE 14 MG/1
14 TABLET ORAL DAILY
Qty: 90 TABLET | Refills: 3 | Status: SHIPPED | OUTPATIENT
Start: 2023-05-23 | End: 2023-05-24 | Stop reason: SDUPTHER

## 2023-05-23 RX ORDER — ATORVASTATIN CALCIUM 20 MG/1
20 TABLET, FILM COATED ORAL DAILY
Qty: 90 TABLET | Refills: 3 | Status: SHIPPED | OUTPATIENT
Start: 2023-05-23 | End: 2023-05-24 | Stop reason: SDUPTHER

## 2023-05-23 RX ORDER — INSULIN DETEMIR 100 [IU]/ML
25 INJECTION, SOLUTION SUBCUTANEOUS NIGHTLY
Qty: 15 ML | Refills: 3 | Status: SHIPPED | OUTPATIENT
Start: 2023-05-23 | End: 2023-05-24 | Stop reason: SDUPTHER

## 2023-05-23 RX ORDER — INSULIN DETEMIR 100 [IU]/ML
28 INJECTION, SOLUTION SUBCUTANEOUS NIGHTLY
Qty: 15 ML | Refills: 3 | Status: CANCELLED | OUTPATIENT
Start: 2023-05-23 | End: 2023-12-23

## 2023-05-23 NOTE — PROGRESS NOTES
2 pt identifiers confirm (name and date of birth)  Pt tolerated well pt advised to wait 15 min post injection

## 2023-05-23 NOTE — PROGRESS NOTES
SUBJECTIVE     Chief Complaint   Patient presents with    Follow-up     Est care       HPI  Hany Devries is a 42 y.o. male with  asthma, factor V Leiden mutation, T2DM, h/o DVT w/ bilateral pulmonary embolism  that presents for establishment of care.     This is a new patient to me but established to [facility].    T2DM:   A1c >14% in 4/2023.   Normal MACR in 1/2023  Rybelsus 7 mg daily  Metformin 1000 mg BID, increased after last A1c.   AM sugars in 210s-220s.   No symptoms of hypoglycemia.   Frequent urination, thirst.     H/o of unprovoked DVT/PE, most recenntly had b/l PE on 8/15/22. Seen by Heme/Onc who recommended lifelong Eliquis. Patient also tested positive for heterozygous Factor V Leiden mutation.     Patient down 61 lbs since 8/25/22.     PAST MEDICAL HISTORY:  Past Medical History:   Diagnosis Date    Asthma     Bilateral pulmonary embolism     Diabetes mellitus, type 2     DVT (deep venous thrombosis)        PAST SURGICAL HISTORY:  Past Surgical History:   Procedure Laterality Date    HERNIA REPAIR      VASECTOMY  4/2015       FAMILY HISTORY:  Family History   Problem Relation Age of Onset    Cancer Mother         breast/lung    Diabetes Sister     Stroke Brother        ALLERGIES AND MEDICATIONS: updated and reviewed.  Review of patient's allergies indicates:  No Known Allergies  Current Outpatient Medications   Medication Sig Dispense Refill    albuterol (PROVENTIL/VENTOLIN HFA) 90 mcg/actuation inhaler Inhale 1-2 puffs into the lungs every 4 (four) hours as needed for Shortness of Breath or Wheezing. 8.5 g 0    apixaban (ELIQUIS) 5 mg Tab Take 1 tablet (5 mg total) by mouth 2 (two) times daily. 180 tablet 3    blood sugar diagnostic Strp To check BG 1 times daily, to use with insurance preferred meter 100 each 3    blood-glucose meter kit One insurance preferred glucometer to monitor glucose daily 1 each 0    EScitalopram oxalate (LEXAPRO) 5 MG Tab Take 1 tablet (5 mg total) by mouth once daily. 30  tablet 3    lancets Misc To check BG 1 times daily, to use with insurance preferred meter 100 each 3    metFORMIN (GLUCOPHAGE-XR) 500 MG ER 24hr tablet Take 2 tablets (1,000 mg total) by mouth 2 (two) times daily with meals. 360 tablet 3    semaglutide (OZEMPIC) 0.25 mg or 0.5 mg(2 mg/1.5 mL) pen injector Inject 0.5 mg into the skin every 7 days. 1 each 1     No current facility-administered medications for this visit.       ROS  Review of Systems   Constitutional:  Negative for activity change, chills and fever.   HENT:  Negative for congestion and hearing loss.    Eyes:  Negative for pain and visual disturbance.   Respiratory:  Negative for cough and shortness of breath.    Cardiovascular:  Negative for chest pain and palpitations.   Gastrointestinal:  Negative for abdominal pain, constipation, diarrhea, nausea and vomiting.   Endocrine: Negative.    Genitourinary: Negative.    Musculoskeletal:  Negative for arthralgias and myalgias.   Skin: Negative.    Allergic/Immunologic: Negative.    Neurological:  Negative for dizziness, light-headedness and headaches.   Hematological: Negative.        OBJECTIVE     Physical Exam  Vitals:    05/23/23 1303   BP: 120/80   Pulse: 79   Temp: 98.6 °F (37 °C)    Body mass index is 42.22 kg/m².            Physical Exam  Vitals reviewed.   Constitutional:       General: He is not in acute distress.     Appearance: Normal appearance. He is obese.   HENT:      Head: Normocephalic and atraumatic.      Mouth/Throat:      Mouth: Mucous membranes are moist.      Pharynx: Oropharynx is clear.   Eyes:      Extraocular Movements: Extraocular movements intact.      Conjunctiva/sclera: Conjunctivae normal.      Pupils: Pupils are equal, round, and reactive to light.   Cardiovascular:      Rate and Rhythm: Normal rate and regular rhythm.      Pulses: Normal pulses.      Heart sounds: Normal heart sounds.   Pulmonary:      Effort: Pulmonary effort is normal.      Breath sounds: Normal breath  sounds.   Abdominal:      General: Bowel sounds are normal. There is no distension.      Palpations: Abdomen is soft. There is no mass.      Tenderness: There is no abdominal tenderness. There is no guarding.   Musculoskeletal:         General: Normal range of motion.      Cervical back: Normal range of motion and neck supple. No rigidity or tenderness.      Right lower leg: No edema.      Left lower leg: No edema.   Lymphadenopathy:      Cervical: No cervical adenopathy.   Skin:     General: Skin is warm and dry.   Neurological:      General: No focal deficit present.      Mental Status: He is alert.   Psychiatric:         Mood and Affect: Mood normal.         Behavior: Behavior normal.         Health Maintenance         Date Due Completion Date    Pneumococcal Vaccines (Age 0-64) (1 - PCV) Never done ---    Eye Exam Never done ---    Low Dose Statin Never done ---    COVID-19 Vaccine (3 - Booster for Moderna series) 05/20/2021 3/25/2021    Lipid Panel 04/16/2022 4/16/2021    Hemoglobin A1c 07/21/2023 4/21/2023    Influenza Vaccine (Season Ended) 09/01/2023 11/25/2019    Diabetes Urine Screening 01/20/2024 1/20/2023    Foot Exam 01/20/2024 1/20/2023    Override on 1/20/2023: Done    TETANUS VACCINE 10/24/2024 10/24/2014              ASSESSMENT     42 y.o. male with     1. Establishing care with new doctor, encounter for    2. Type 2 diabetes mellitus without complication, without long-term current use of insulin    3. Personal history of pulmonary embolism    4. Chronic deep vein thrombosis (DVT) of other vein of both lower extremities    5. Heterozygous factor V Leiden mutation    6. Class 3 severe obesity due to excess calories with serious comorbidity and body mass index (BMI) of 40.0 to 44.9 in adult        PLAN:     1. Establishing care with new doctor, encounter for  - Prior notes/labs/imaging reviewed.  - Reviewed patient's medical, surgical, family, and social history; chart updated.     2. Type 2 diabetes  mellitus without complication, without long-term current use of insulin  - Sugars not controlled on current regimen. Continue Metformin, increase Rybelsus to 14 mg qd. Given that A1C > 14 with symptoms of polyuria, polydipsia, will need to start insulin therapy. Will start on Levemir 25 units qhs. Counseled patient at length about FBG targets and what to do in case of hypoglycemia.   - Referral in place to follow up with Diabetic FNP, encouraged follow up.   - LIPID PANEL; Future  - atorvastatin (LIPITOR) 20 MG tablet; Take 1 tablet (20 mg total) by mouth once daily.  Dispense: 90 tablet; Refill: 3  - semaglutide (RYBELSUS) 14 mg tablet; Take 1 tablet (14 mg total) by mouth once daily.  Dispense: 90 tablet; Refill: 3    3. Personal history of pulmonary embolism  - Stable on Eliquis. Established with Heme/Onc, continue follow up.     4. Chronic deep vein thrombosis (DVT) of other vein of both lower extremities  - Stable on Eliquis. Established with Heme/Onc, continue follow up.     5. Heterozygous factor V Leiden mutation  - Stable on Eliquis. Established with Heme/Onc, continue follow up.     6. Class 3 severe obesity due to excess calories with serious comorbidity and body mass index (BMI) of 40.0 to 44.9 in adult  - Congratulated patient on progress and encouraged continued diet, exercise.   - Rybelsus increased to 14 mg qd.         RTC in 3 months, earlier PRN.      Michael Hennessy MD  Family Medicine  [facility]  05/23/2023    This document was created using voice recognition software (M*Modal Fluency Direct). Although it may be edited, this document may contain errors related to incorrect recognition of the spoken word. Please call the physician if clarification is needed.           No follow-ups on file.

## 2023-05-24 ENCOUNTER — PATIENT MESSAGE (OUTPATIENT)
Dept: INTERNAL MEDICINE | Facility: CLINIC | Age: 43
End: 2023-05-24
Payer: COMMERCIAL

## 2023-05-24 ENCOUNTER — OFFICE VISIT (OUTPATIENT)
Dept: HEMATOLOGY/ONCOLOGY | Facility: CLINIC | Age: 43
End: 2023-05-24
Payer: COMMERCIAL

## 2023-05-24 VITALS
DIASTOLIC BLOOD PRESSURE: 97 MMHG | HEART RATE: 94 BPM | RESPIRATION RATE: 18 BRPM | SYSTOLIC BLOOD PRESSURE: 146 MMHG | TEMPERATURE: 99 F | OXYGEN SATURATION: 97 % | WEIGHT: 298.31 LBS | HEIGHT: 71 IN | BODY MASS INDEX: 41.76 KG/M2

## 2023-05-24 DIAGNOSIS — Z86.718 HISTORY OF DVT (DEEP VEIN THROMBOSIS): Primary | ICD-10-CM

## 2023-05-24 DIAGNOSIS — D68.51 HETEROZYGOUS FACTOR V LEIDEN MUTATION: ICD-10-CM

## 2023-05-24 DIAGNOSIS — E66.01 CLASS 3 SEVERE OBESITY DUE TO EXCESS CALORIES WITH SERIOUS COMORBIDITY AND BODY MASS INDEX (BMI) OF 45.0 TO 49.9 IN ADULT: ICD-10-CM

## 2023-05-24 DIAGNOSIS — E11.9 TYPE 2 DIABETES MELLITUS WITHOUT COMPLICATION, WITHOUT LONG-TERM CURRENT USE OF INSULIN: ICD-10-CM

## 2023-05-24 DIAGNOSIS — Z86.711 PERSONAL HISTORY OF PULMONARY EMBOLISM: ICD-10-CM

## 2023-05-24 PROCEDURE — 3061F PR NEG MICROALBUMINURIA RESULT DOCUMENTED/REVIEW: ICD-10-PCS | Mod: CPTII,S$GLB,, | Performed by: STUDENT IN AN ORGANIZED HEALTH CARE EDUCATION/TRAINING PROGRAM

## 2023-05-24 PROCEDURE — 99214 OFFICE O/P EST MOD 30 MIN: CPT | Mod: S$GLB,,, | Performed by: STUDENT IN AN ORGANIZED HEALTH CARE EDUCATION/TRAINING PROGRAM

## 2023-05-24 PROCEDURE — 99999 PR PBB SHADOW E&M-EST. PATIENT-LVL IV: CPT | Mod: PBBFAC,,, | Performed by: STUDENT IN AN ORGANIZED HEALTH CARE EDUCATION/TRAINING PROGRAM

## 2023-05-24 PROCEDURE — 3046F PR MOST RECENT HEMOGLOBIN A1C LEVEL > 9.0%: ICD-10-PCS | Mod: CPTII,S$GLB,, | Performed by: STUDENT IN AN ORGANIZED HEALTH CARE EDUCATION/TRAINING PROGRAM

## 2023-05-24 PROCEDURE — 3077F SYST BP >= 140 MM HG: CPT | Mod: CPTII,S$GLB,, | Performed by: STUDENT IN AN ORGANIZED HEALTH CARE EDUCATION/TRAINING PROGRAM

## 2023-05-24 PROCEDURE — 3080F DIAST BP >= 90 MM HG: CPT | Mod: CPTII,S$GLB,, | Performed by: STUDENT IN AN ORGANIZED HEALTH CARE EDUCATION/TRAINING PROGRAM

## 2023-05-24 PROCEDURE — 1160F PR REVIEW ALL MEDS BY PRESCRIBER/CLIN PHARMACIST DOCUMENTED: ICD-10-PCS | Mod: CPTII,S$GLB,, | Performed by: STUDENT IN AN ORGANIZED HEALTH CARE EDUCATION/TRAINING PROGRAM

## 2023-05-24 PROCEDURE — 3046F HEMOGLOBIN A1C LEVEL >9.0%: CPT | Mod: CPTII,S$GLB,, | Performed by: STUDENT IN AN ORGANIZED HEALTH CARE EDUCATION/TRAINING PROGRAM

## 2023-05-24 PROCEDURE — 3061F NEG MICROALBUMINURIA REV: CPT | Mod: CPTII,S$GLB,, | Performed by: STUDENT IN AN ORGANIZED HEALTH CARE EDUCATION/TRAINING PROGRAM

## 2023-05-24 PROCEDURE — 99999 PR PBB SHADOW E&M-EST. PATIENT-LVL IV: ICD-10-PCS | Mod: PBBFAC,,, | Performed by: STUDENT IN AN ORGANIZED HEALTH CARE EDUCATION/TRAINING PROGRAM

## 2023-05-24 PROCEDURE — 1159F MED LIST DOCD IN RCRD: CPT | Mod: CPTII,S$GLB,, | Performed by: STUDENT IN AN ORGANIZED HEALTH CARE EDUCATION/TRAINING PROGRAM

## 2023-05-24 PROCEDURE — 1159F PR MEDICATION LIST DOCUMENTED IN MEDICAL RECORD: ICD-10-PCS | Mod: CPTII,S$GLB,, | Performed by: STUDENT IN AN ORGANIZED HEALTH CARE EDUCATION/TRAINING PROGRAM

## 2023-05-24 PROCEDURE — 1160F RVW MEDS BY RX/DR IN RCRD: CPT | Mod: CPTII,S$GLB,, | Performed by: STUDENT IN AN ORGANIZED HEALTH CARE EDUCATION/TRAINING PROGRAM

## 2023-05-24 PROCEDURE — 3066F NEPHROPATHY DOC TX: CPT | Mod: CPTII,S$GLB,, | Performed by: STUDENT IN AN ORGANIZED HEALTH CARE EDUCATION/TRAINING PROGRAM

## 2023-05-24 PROCEDURE — 3008F BODY MASS INDEX DOCD: CPT | Mod: CPTII,S$GLB,, | Performed by: STUDENT IN AN ORGANIZED HEALTH CARE EDUCATION/TRAINING PROGRAM

## 2023-05-24 PROCEDURE — 3008F PR BODY MASS INDEX (BMI) DOCUMENTED: ICD-10-PCS | Mod: CPTII,S$GLB,, | Performed by: STUDENT IN AN ORGANIZED HEALTH CARE EDUCATION/TRAINING PROGRAM

## 2023-05-24 PROCEDURE — 99214 PR OFFICE/OUTPT VISIT, EST, LEVL IV, 30-39 MIN: ICD-10-PCS | Mod: S$GLB,,, | Performed by: STUDENT IN AN ORGANIZED HEALTH CARE EDUCATION/TRAINING PROGRAM

## 2023-05-24 PROCEDURE — 3077F PR MOST RECENT SYSTOLIC BLOOD PRESSURE >= 140 MM HG: ICD-10-PCS | Mod: CPTII,S$GLB,, | Performed by: STUDENT IN AN ORGANIZED HEALTH CARE EDUCATION/TRAINING PROGRAM

## 2023-05-24 PROCEDURE — 3080F PR MOST RECENT DIASTOLIC BLOOD PRESSURE >= 90 MM HG: ICD-10-PCS | Mod: CPTII,S$GLB,, | Performed by: STUDENT IN AN ORGANIZED HEALTH CARE EDUCATION/TRAINING PROGRAM

## 2023-05-24 PROCEDURE — 3066F PR DOCUMENTATION OF TREATMENT FOR NEPHROPATHY: ICD-10-PCS | Mod: CPTII,S$GLB,, | Performed by: STUDENT IN AN ORGANIZED HEALTH CARE EDUCATION/TRAINING PROGRAM

## 2023-05-24 NOTE — TELEPHONE ENCOUNTER
Refill Routing Note   Medication(s) are not appropriate for processing by [facility] for the following reason(s):      - Outside of protocol  - [facility] REQUIREMENT; PARTICIPATING PROVIDER ESTABLISHED AS PCP  - Unclear if patient follows you    [facility] action(s):  Route          Medication reconciliation completed: No     Appointments  past 12m or future 3m with PCP    Date Provider   Last Visit   5/23/2023 Michael Hennessy MD   Next Visit   Visit date not found Michael Hnenessy MD   ED visits in past 90 days: 0        Note composed:10:53 AM 05/24/2023

## 2023-05-24 NOTE — PROGRESS NOTES
Hematology- Oncology Clinic Note :      5/24/2023    RFV / chief complaint- Bilateral pulmonary embolism          HPI  Pt is a 42 y.o. male who  has a past medical history of Asthma, Bilateral pulmonary embolism, Diabetes mellitus, type 2, and DVT (deep venous thrombosis).   Pt presents to the clinic today for DVT/ anticoagulation recs    DVT R leg at the age of 34- was on eliquis  2017 - was taken off eliquis for few months, then   developed DVT in LLE   since then has been on eliquis.   recently had a lapse and was not able to get eliquis due to insurance changes.   Aug 2022- b/l PE   immediate family- mother &7 sibling- none with VTE. father  had DVT after paralysis. maternal aunt passed away from VTE, her 3 daughters have DVT. pt 2 kids    Pt has been tolerating eliquis without any bleeding issues.    Reviewed past medical/surgical/social history    Past Medical History:   Diagnosis Date    Asthma     Bilateral pulmonary embolism     Diabetes mellitus, type 2     DVT (deep venous thrombosis)       Past Surgical History:   Procedure Laterality Date    HERNIA REPAIR      VASECTOMY  4/2015      Review of patient's allergies indicates:  No Known Allergies   Social History     Tobacco Use    Smoking status: Never    Smokeless tobacco: Never   Substance Use Topics    Alcohol use: Yes     Alcohol/week: 2.0 standard drinks     Types: 2 Shots of liquor per week      Family History   Problem Relation Age of Onset    Cancer Mother         breast/lung    Diabetes Sister     Stroke Brother           Review of Systems :  Review of Systems   Constitutional:  Negative for chills, diaphoresis, fever, malaise/fatigue and weight loss.   HENT: Negative.  Negative for congestion, hearing loss, nosebleeds, sore throat and tinnitus.    Eyes: Negative.  Negative for blurred vision and discharge.   Respiratory:  Negative for cough, hemoptysis, sputum production, shortness of breath and wheezing.    Cardiovascular:  "Negative.  Negative for chest pain, palpitations and leg swelling.   Gastrointestinal: Negative.  Negative for abdominal pain, blood in stool, constipation, diarrhea, heartburn, melena, nausea and vomiting.   Genitourinary: Negative.    Musculoskeletal:  Positive for joint pain (R knee pain). Negative for back pain, falls and myalgias.   Skin: Negative.  Negative for itching and rash.   Neurological: Negative.  Negative for dizziness, tingling, sensory change, speech change, focal weakness, seizures, loss of consciousness, weakness and headaches.   Endo/Heme/Allergies: Negative.  Does not bruise/bleed easily.   Psychiatric/Behavioral: Negative.  Negative for depression. The patient is not nervous/anxious and does not have insomnia.              Physical Exam :  BP (!) 146/97 (BP Location: Left arm, Patient Position: Sitting, BP Method: Large (Automatic))   Pulse 94   Temp 98.6 °F (37 °C) (Oral)   Resp 18   Ht 5' 11" (1.803 m)   Wt 135.3 kg (298 lb 4.5 oz)   SpO2 97%   BMI 41.60 kg/m²   Wt Readings from Last 3 Encounters:   05/24/23 135.3 kg (298 lb 4.5 oz)   05/23/23 (!) 137.3 kg (302 lb 11.1 oz)   01/20/23 (!) 160.3 kg (353 lb 6.4 oz)       Body mass index is 41.6 kg/m².      Physical Exam  Vitals and nursing note reviewed.   Constitutional:       General: He is not in acute distress.     Appearance: Normal appearance. He is not ill-appearing.   HENT:      Head: Normocephalic and atraumatic.      Right Ear: External ear normal.      Left Ear: External ear normal.      Mouth/Throat:      Pharynx: No oropharyngeal exudate.   Eyes:      General: No scleral icterus.        Right eye: No discharge.         Left eye: No discharge.   Cardiovascular:      Rate and Rhythm: Normal rate.   Pulmonary:      Effort: Pulmonary effort is normal. No respiratory distress.   Abdominal:      General: There is no distension.   Musculoskeletal:         General: No swelling or deformity.      Cervical back: Normal range of motion " and neck supple.      Right lower leg: No edema.      Left lower leg: No edema.   Skin:     Coloration: Skin is not jaundiced.      Findings: No bruising, erythema, lesion or rash.   Neurological:      General: No focal deficit present.      Mental Status: He is alert and oriented to person, place, and time. Mental status is at baseline.      Coordination: Coordination normal.      Gait: Gait normal.   Psychiatric:         Mood and Affect: Mood normal.         Behavior: Behavior normal.           Current Outpatient Medications   Medication Sig Dispense Refill    apixaban (ELIQUIS) 5 mg Tab Take 1 tablet (5 mg total) by mouth 2 (two) times daily. 180 tablet 3    atorvastatin (LIPITOR) 20 MG tablet Take 1 tablet (20 mg total) by mouth once daily. 90 tablet 3    blood sugar diagnostic Strp To check BG 1 times daily, to use with insurance preferred meter 100 each 3    blood-glucose meter kit One insurance preferred glucometer to monitor glucose daily 1 each 0    EScitalopram oxalate (LEXAPRO) 5 MG Tab Take 1 tablet (5 mg total) by mouth once daily. 30 tablet 3    insulin detemir U-100, Levemir, (LEVEMIR FLEXPEN) 100 unit/mL (3 mL) InPn pen Inject 25 Units into the skin every evening. 15 mL 3    lancets Misc To check BG 1 times daily, to use with insurance preferred meter 100 each 3    metFORMIN (GLUCOPHAGE-XR) 500 MG ER 24hr tablet Take 2 tablets (1,000 mg total) by mouth 2 (two) times daily with meals. 360 tablet 3    semaglutide (RYBELSUS) 14 mg tablet Take 1 tablet (14 mg total) by mouth once daily. 90 tablet 3     No current facility-administered medications for this visit.       Pertinent Diagnostic studies:      Lab Visit on 05/22/2023   Component Date Value Ref Range Status    WBC 05/22/2023 7.85  3.90 - 12.70 K/uL Final    RBC 05/22/2023 5.02  4.60 - 6.20 M/uL Final    Hemoglobin 05/22/2023 12.8 (L)  14.0 - 18.0 g/dL Final    Hematocrit 05/22/2023 41.7  40.0 - 54.0 % Final    MCV 05/22/2023 83  82  - 98 fL Final    MCH 05/22/2023 25.5 (L)  27.0 - 31.0 pg Final    MCHC 05/22/2023 30.7 (L)  32.0 - 36.0 g/dL Final    RDW 05/22/2023 14.0  11.5 - 14.5 % Final    Platelets 05/22/2023 209  150 - 450 K/uL Final    MPV 05/22/2023 12.3  9.2 - 12.9 fL Final    Immature Granulocytes 05/22/2023 0.1  0.0 - 0.5 % Final    Gran # (ANC) 05/22/2023 5.4  1.8 - 7.7 K/uL Final    Immature Grans (Abs) 05/22/2023 0.01  0.00 - 0.04 K/uL Final    Comment: Mild elevation in immature granulocytes is non specific and   can be seen in a variety of conditions including stress response,   acute inflammation, trauma and pregnancy. Correlation with other   laboratory and clinical findings is essential.      Lymph # 05/22/2023 1.7  1.0 - 4.8 K/uL Final    Mono # 05/22/2023 0.6  0.3 - 1.0 K/uL Final    Eos # 05/22/2023 0.2  0.0 - 0.5 K/uL Final    Baso # 05/22/2023 0.03  0.00 - 0.20 K/uL Final    nRBC 05/22/2023 0  0 /100 WBC Final    Gran % 05/22/2023 68.7  38.0 - 73.0 % Final    Lymph % 05/22/2023 21.3  18.0 - 48.0 % Final    Mono % 05/22/2023 7.1  4.0 - 15.0 % Final    Eosinophil % 05/22/2023 2.4  0.0 - 8.0 % Final    Basophil % 05/22/2023 0.4  0.0 - 1.9 % Final    Differential Method 05/22/2023 Automated   Final    Sodium 05/22/2023 136  136 - 145 mmol/L Final    Potassium 05/22/2023 4.4  3.5 - 5.1 mmol/L Final    Chloride 05/22/2023 102  95 - 110 mmol/L Final    CO2 05/22/2023 25  23 - 29 mmol/L Final    Glucose 05/22/2023 372 (H)  70 - 110 mg/dL Final    BUN 05/22/2023 7  6 - 20 mg/dL Final    Creatinine 05/22/2023 1.0  0.5 - 1.4 mg/dL Final    Calcium 05/22/2023 9.7  8.7 - 10.5 mg/dL Final    Total Protein 05/22/2023 7.4  6.0 - 8.4 g/dL Final    Albumin 05/22/2023 3.8  3.5 - 5.2 g/dL Final    Total Bilirubin 05/22/2023 0.6  0.1 - 1.0 mg/dL Final    Comment: For infants and newborns, interpretation of results should be based  on gestational age, weight and in agreement with  clinical  observations.    Premature Infant recommended reference ranges:  Up to 24 hours.............<8.0 mg/dL  Up to 48 hours............<12.0 mg/dL  3-5 days..................<15.0 mg/dL  6-29 days.................<15.0 mg/dL      Alkaline Phosphatase 2023 60  55 - 135 U/L Final    AST 2023 20  10 - 40 U/L Final    ALT 2023 23  10 - 44 U/L Final    Anion Gap 2023 9  8 - 16 mmol/L Final    eGFR 2023 >60.0  >60 mL/min/1.73 m^2 Final           Oncology History    No history exists.     Assessment :   ECOG 1    1. History of DVT (deep vein thrombosis)    2. Heterozygous factor V Leiden mutation    3. Personal history of pulmonary embolism    4. Class 3 severe obesity due to excess calories with serious comorbidity and body mass index (BMI) of 45.0 to 49.9 in adult    5. Type 2 diabetes mellitus without complication, without long-term current use of insulin        Plan :       Unprovoked DVT/PE  DVT R leg at the age of 34- was on eliquis  2017 - was taken off eliquis for few months, then developed DVT in LLE ; since then has been on eliquis.   recently had a lapse and was not able to get eliquis due to insurance changes.   Aug 2022- b/l PE   CTA 8/15/22- 1. Bilateral pulmonary emboli including distal main and segmental pulmonary arteries on the right and segmental pulmonary arteries on the left.   Continue eliquis lifelong unless contraindicated     Heterozygous FVL- results d/w pt. Printed copy given.   Pt advised to get kids and siblings checked. Both his parents are  but he has some maternal cousins who have issues with blood clots. He will discuss his results with them too.       Hand out on patient education regarding safety while taking oral anticoagulation given   Avoid excessive NSAID use  Limit alcohol intake  Home safety measures to avoid falls. Wearing helmet while on bike. Avoid sports with increased risk of injuries.   ER precautions were discussed.     Morbid  obesity- lifestyle modifications d/w pt   Lost some weight   encouraged to keep up     DM_ can be better controlled, f/u pcp     Electronically signed by Michelle Dang    [facility]      Future Appointments   Date Time Provider Department Center   8/23/2023  1:15 PM BORIS Watt             This note was created with voice recognition software.  Grammatical, syntax and spelling errors may be inevitable.

## 2023-05-25 RX ORDER — INSULIN DETEMIR 100 [IU]/ML
25 INJECTION, SOLUTION SUBCUTANEOUS NIGHTLY
Qty: 15 ML | Refills: 3 | Status: SHIPPED | OUTPATIENT
Start: 2023-05-25 | End: 2023-06-15 | Stop reason: SDUPTHER

## 2023-05-25 RX ORDER — ORAL SEMAGLUTIDE 14 MG/1
14 TABLET ORAL DAILY
Qty: 90 TABLET | Refills: 3 | Status: SHIPPED | OUTPATIENT
Start: 2023-05-25 | End: 2023-06-15 | Stop reason: SDUPTHER

## 2023-05-25 RX ORDER — ATORVASTATIN CALCIUM 20 MG/1
20 TABLET, FILM COATED ORAL DAILY
Qty: 90 TABLET | Refills: 3 | Status: SHIPPED | OUTPATIENT
Start: 2023-05-25 | End: 2023-06-15 | Stop reason: SDUPTHER

## 2023-05-25 RX ORDER — ORAL SEMAGLUTIDE 14 MG/1
14 TABLET ORAL DAILY
Qty: 90 TABLET | Refills: 3 | Status: SHIPPED | OUTPATIENT
Start: 2023-05-25 | End: 2024-01-26

## 2023-05-30 DIAGNOSIS — E11.9 TYPE 2 DIABETES MELLITUS WITHOUT COMPLICATION, WITHOUT LONG-TERM CURRENT USE OF INSULIN: ICD-10-CM

## 2023-05-30 RX ORDER — METFORMIN HYDROCHLORIDE 500 MG/1
TABLET, EXTENDED RELEASE ORAL
Refills: 0 | OUTPATIENT
Start: 2023-05-30

## 2023-05-30 NOTE — TELEPHONE ENCOUNTER
Care Due:                  Date            Visit Type   Department     Provider  --------------------------------------------------------------------------------                                NP -                              PRIMARY      [facility]  Last Visit: 05-      CARE ([facility])   MEDICINE       Michael Hennessy  Next Visit: None Scheduled  None         None Found                                                            Last  Test          Frequency    Reason                     Performed    Due Date  --------------------------------------------------------------------------------    Lipid Panel.  12 months..  atorvastatin.............  Not Found    Overdue    [employer] Embedded Care Due Messages. Reference number: [id number].   5/30/2023 9:47:10 AM CDT

## 2023-05-30 NOTE — TELEPHONE ENCOUNTER
Refill Decision Note   Hany Devries  is requesting a refill authorization.  Brief Assessment and Rationale for Refill:  Quick Discontinue     Medication Therapy Plan:    Pharmacy is requesting new scripts for the following medications without required information, (sig/ frequency/qty/etc)      Medication Reconciliation Completed: No     Comments: Pharmacies have been requesting medications for patients without required information, (sig, frequency, qty, etc.). In addition, requests are sent for medication(s) pt. are currently not taking, and medications patients have never taken.    We have spoken to the pharmacies about these request types and advised their teams previously that we are unable to assess these New Script requests and require all details for these requests. This is a known issue and has been reported.     Note composed:9:57 AM 05/30/2023

## 2023-06-02 ENCOUNTER — TELEPHONE (OUTPATIENT)
Dept: INTERNAL MEDICINE | Facility: CLINIC | Age: 43
End: 2023-06-02
Payer: COMMERCIAL

## 2023-06-02 NOTE — TELEPHONE ENCOUNTER
Patient was confused about his diabetic medication. We went over chart notes from Dr. Hennessy last visit to clarify and confirm. Pt expressed understanding with no further questions

## 2023-06-02 NOTE — TELEPHONE ENCOUNTER
----- Message from Beau Laws sent at 6/2/2023 11:10 AM CDT -----  Contact: [phone]  Pt said he needs a call back about questions he has about his mes. Please trang pt back.

## 2023-06-05 ENCOUNTER — TELEPHONE (OUTPATIENT)
Dept: INTERNAL MEDICINE | Facility: CLINIC | Age: 43
End: 2023-06-05
Payer: COMMERCIAL

## 2023-06-05 ENCOUNTER — CLINICAL SUPPORT (OUTPATIENT)
Dept: DIABETES | Facility: CLINIC | Age: 43
End: 2023-06-05
Payer: COMMERCIAL

## 2023-06-05 DIAGNOSIS — E11.65 UNCONTROLLED TYPE 2 DIABETES MELLITUS WITH HYPERGLYCEMIA: ICD-10-CM

## 2023-06-05 PROCEDURE — 99999 PR PBB SHADOW E&M-EST. PATIENT-LVL I: ICD-10-PCS | Mod: PBBFAC,,,

## 2023-06-05 PROCEDURE — 99999 PR PBB SHADOW E&M-EST. PATIENT-LVL I: CPT | Mod: PBBFAC,,,

## 2023-06-05 NOTE — PROGRESS NOTES
Diabetes Care Specialist Progress Note  Author: Jeana Benavides RD  Date: 6/5/2023    Program Intake  Reason for Diabetes Program Visit:: Initial Diabetes Assessment  Current diabetes risk level:: high  In the last 12 months, have you:: used emergency room services  Was the ER or hospital admission related to diabetes?: No    Lab Results   Component Value Date    HGBA1C >14.0 (H) 04/21/2023     Clinical    Clinical Assessment  Current Diabetes Treatment: Oral Medication, Insulin  Have you ever experienced hyperglycemia (high blood sugar)?: no    Medication Information  Medication adherence impacting ability to self-manage diabetes?: No    Labs  Do you have regular lab work to monitor your medications?: Yes  Type of Regular Lab Work: A1c, Cholesterol, Microalbumin, CBC, BMP  Lab Compliance Barriers: No    Nutritional Status  Diet: Regular  Meal Plan 24 Hour Recall: Breakfast, Lunch, Dinner, Snack  Meal Plan 24 Hour Recall - Breakfast: 2 hard boiled eggs, cheese, fruit, grapefruit juice  Meal Plan 24 Hour Recall - Lunch: Turkey sanwich with Smart Pop or Baked Lays  Meal Plan 24 Hour Recall - Dinner: Hot dogs with chili  Meal Plan 24 Hour Recall - Snack: Cheese crackers, fruit, nachos. Drinks: water with lemon, orange juice  Change in appetite?: Yes  Current nutritional status an area of need that is impacting patient's ability to self-manage diabetes?: No    Additional Social History    Support  Does anyone support you with your diabetes care?: yes  Who supports you?: self  Who takes you to your medical appointments?: self  Does the current support meet the patient's needs?: Yes  Is Support an area impacting ability to self-manage diabetes?: No    Cognitive/Behavioral Health  Alert and Oriented: Yes  Difficulty Thinking: No  Requires Prompting: No  Requires assistance for routine expression?: No  Cognitive or behavioral barriers impacting ability to self-manage diabetes?: No    Communication  Language preference:  English  Hearing Problems: No  Vision Problems: No  Communication needs impacting ability to self-manage diabetes?: No    Health Literacy  Preferred Learning Method: Face to Face, Reading Materials  Health literacy needs impacting ability to self-manage diabetes?: No    Diabetes Self-Management Skills Assessment    Diabetes Disease Process/Treatment Options  Diabetes Disease Process/Treatment Options: Skills Assessment Completed: No  Deferred due to:: Time    Nutrition/Healthy Eating  Challenges to healthy eating:: portion control  Method of carbohydrate measurement:: no method  Nutrition/Healthy Eating Skills Assessment Completed:: Yes  Assessment indicates:: Knowledge deficit, Instruction Needed  Area of need?: Yes    Physical Activity/Exercise  Patient's daily activity level:: lightly active  Patient formally exercises outside of work.: yes  Exercise Type: walking  Intensity: Low  Frequency: four or more times a week  Physical Activity/Exercise Skills Assessment Completed: : Yes  Assessment indicates:: Adequate understanding  Area of need?: No    Medications  Patient is able to describe current diabetes management routine.: yes  Diabetes management routine:: insulin, oral medications  Patient is able to identify current diabetes medications, dosages, and appropriate timing of medications.: yes (Metformin, Levemir:25 units/day, Rybelsus)  Patient reports problems or concerns with current medication regimen.: no  Medication Skills Assessment Completed:: Yes  Assessment indicates:: Adequate understanding  Area of need?: No    Home Blood Glucose Monitoring  Patient states that blood sugar is checked at home daily.: yes  Monitoring Method:: home glucometer  How often do you check your blood sugar?: Twice a day  When do you check your blood sugar?: Before breakfast, Before bedtime  When you check what is your typical blood sugar range? : 225 this AM  Blood glucose logs:: no, encouraged to keep logs, encouraged to bring  logs to provider visits  Blood glucose logs reviewed today?: no  Home Blood Glucose Monitoring Skills Assessment Completed: : Yes  Assessment indicates:: Adequate understanding  Area of need?: No    Acute Complications  Acute Complications Skills Assessment Completed: : No  Deffered due to:: Time    Chronic Complications  Patient is taking statin?: Yes  Chronic Complications Skills Assessment Completed: : No  Deferred due to:: Time    Psychosocial/Coping  Psychosocial/Coping Skills Assessment Completed: : No  Deffered due to:: Time    Diabetes Self Support Plan    Assessment Summary and Plan    Based on today's diabetes care assessment, the following areas of need were identified:      Social 6/5/2023   Support No   Cognitive/Behavioral Health No   Communication No   Health Literacy No        Clinical 6/5/2023   Medication Adherence No   Lab Compliance No   Nutritional Status No        Diabetes Self-Management Skills 6/5/2023   Nutrition/Healthy Eating Yes, see care plan.   Physical Activity/Exercise No   Medication No   Home Blood Glucose Monitoring No      Today's interventions were provided through individual discussion, instruction, and written materials were provided.      Patient verbalized understanding of instruction and written materials.  Pt was able to return back demonstration of instructions today. Patient understood key points, needs reinforcement and further instruction.     Diabetes Self-Management Care Plan:    Today's Diabetes Self-Management Care Plan was developed with Hany's input. Hany has agreed to work toward the following goal(s) to improve his/her overall diabetes control.      Care Plan: Diabetes Management   Updates made since 5/6/2023 12:00 AM        Problem: Healthy Eating         Goal: Eat 3 meals daily with 45-60g/3-4 servings of Carbohydrate per meal.    Start Date: 6/5/2023   Expected End Date: 9/5/2023   This Visit's Progress: Deferred   Priority: High   Barriers: No Barriers  Identified   Note:    Reviewed meal planning and general nutritional counseling with regards to diabetes management. Meal habits reviewed. Reviewed basic Carbohydrate Counting principles--Food groups, label reading, use of dry measuring cups for accurate portion sizes       Task: Provided visual examples using dry measuring cups, food models, and other familiar objects such as computer mouse, deck or cards, tennis ball etc. to help with visualization of portions. Completed 6/5/2023        Task: Explained how to count carbohydrates using the food label and the use of dry measuring cups for accurate carb counting. Completed 6/5/2023        Task: Review the importance of balancing carbohydrates with each meal using portion control techniques to count servings of carbohydrate and label reading to identify serving size and amount of total carbs per serving. Completed 6/5/2023        Follow Up Plan     Follow up in about 4 weeks (around 7/3/2023) for 1-month F/U.    Today's care plan and follow up schedule was discussed with patient.  Hany verbalized understanding of the care plan, goals, and agrees to follow up plan.        The patient was encouraged to communicate with his/her health care provider/physician and care team regarding his/her condition(s) and treatment.  I provided the patient with my contact information today and encouraged to contact me via phone or [facility] Patient Portal as needed.     Length of Visit   Total Time: 30 Minutes

## 2023-06-05 NOTE — TELEPHONE ENCOUNTER
----- Message from Ursula Reyes sent at 6/5/2023 12:09 PM CDT -----  Contact: [phone] Patient  Pt is calling in regards to his semaglutide (RYBELSUS) 14 mg tablet needing a PA. Pt stated [facility] reached out to the office but has not got a response yet. Please call and advise. Thank you.

## 2023-06-12 ENCOUNTER — PATIENT MESSAGE (OUTPATIENT)
Dept: INTERNAL MEDICINE | Facility: CLINIC | Age: 43
End: 2023-06-12
Payer: COMMERCIAL

## 2023-06-12 DIAGNOSIS — F32.A DEPRESSION, UNSPECIFIED DEPRESSION TYPE: ICD-10-CM

## 2023-06-12 DIAGNOSIS — E11.9 TYPE 2 DIABETES MELLITUS WITHOUT COMPLICATION, WITHOUT LONG-TERM CURRENT USE OF INSULIN: ICD-10-CM

## 2023-06-12 DIAGNOSIS — I26.99 BILATERAL PULMONARY EMBOLISM: ICD-10-CM

## 2023-06-12 DIAGNOSIS — F41.9 ANXIETY: ICD-10-CM

## 2023-06-15 ENCOUNTER — TELEPHONE (OUTPATIENT)
Dept: INTERNAL MEDICINE | Facility: CLINIC | Age: 43
End: 2023-06-15
Payer: COMMERCIAL

## 2023-06-15 ENCOUNTER — PATIENT MESSAGE (OUTPATIENT)
Dept: INTERNAL MEDICINE | Facility: CLINIC | Age: 43
End: 2023-06-15
Payer: COMMERCIAL

## 2023-06-15 RX ORDER — ATORVASTATIN CALCIUM 20 MG/1
20 TABLET, FILM COATED ORAL DAILY
Qty: 90 TABLET | Refills: 3 | Status: SHIPPED | OUTPATIENT
Start: 2023-06-15 | End: 2024-06-14

## 2023-06-15 RX ORDER — METFORMIN HYDROCHLORIDE 500 MG/1
1000 TABLET, EXTENDED RELEASE ORAL 2 TIMES DAILY WITH MEALS
Qty: 360 TABLET | Refills: 3 | Status: SHIPPED | OUTPATIENT
Start: 2023-06-15 | End: 2024-06-14

## 2023-06-15 RX ORDER — INSULIN DETEMIR 100 [IU]/ML
25 INJECTION, SOLUTION SUBCUTANEOUS NIGHTLY
Qty: 15 ML | Refills: 3 | Status: SHIPPED | OUTPATIENT
Start: 2023-06-15 | End: 2024-01-16

## 2023-06-15 RX ORDER — ORAL SEMAGLUTIDE 14 MG/1
14 TABLET ORAL DAILY
Qty: 90 TABLET | Refills: 3 | Status: SHIPPED | OUTPATIENT
Start: 2023-06-15 | End: 2024-01-26

## 2023-06-15 RX ORDER — ESCITALOPRAM OXALATE 5 MG/1
5 TABLET ORAL DAILY
Qty: 30 TABLET | Refills: 3 | Status: SHIPPED | OUTPATIENT
Start: 2023-06-15

## 2023-06-19 ENCOUNTER — PATIENT MESSAGE (OUTPATIENT)
Dept: INTERNAL MEDICINE | Facility: CLINIC | Age: 43
End: 2023-06-19
Payer: COMMERCIAL

## 2023-06-19 NOTE — TELEPHONE ENCOUNTER
[facility] message sent with recommendations from Dr. Hennessy. Staff message sent to Optometry department regarding earlier appt

## 2023-08-02 DIAGNOSIS — E11.9 TYPE 2 DIABETES MELLITUS WITHOUT COMPLICATION: ICD-10-CM

## 2023-10-19 ENCOUNTER — PATIENT MESSAGE (OUTPATIENT)
Dept: ADMINISTRATIVE | Facility: HOSPITAL | Age: 43
End: 2023-10-19
Payer: COMMERCIAL

## 2023-10-19 DIAGNOSIS — E11.9 TYPE 2 DIABETES MELLITUS WITHOUT COMPLICATION, UNSPECIFIED WHETHER LONG TERM INSULIN USE: ICD-10-CM

## 2024-01-05 ENCOUNTER — OFFICE VISIT (OUTPATIENT)
Dept: INTERNAL MEDICINE | Facility: CLINIC | Age: 44
End: 2024-01-05
Payer: COMMERCIAL

## 2024-01-05 ENCOUNTER — LAB VISIT (OUTPATIENT)
Dept: LAB | Facility: HOSPITAL | Age: 44
End: 2024-01-05
Attending: STUDENT IN AN ORGANIZED HEALTH CARE EDUCATION/TRAINING PROGRAM
Payer: COMMERCIAL

## 2024-01-05 VITALS
OXYGEN SATURATION: 96 % | BODY MASS INDEX: 44.1 KG/M2 | SYSTOLIC BLOOD PRESSURE: 126 MMHG | DIASTOLIC BLOOD PRESSURE: 80 MMHG | HEART RATE: 97 BPM | HEIGHT: 71 IN | WEIGHT: 315 LBS

## 2024-01-05 DIAGNOSIS — E78.5 HYPERLIPIDEMIA, UNSPECIFIED HYPERLIPIDEMIA TYPE: ICD-10-CM

## 2024-01-05 DIAGNOSIS — E11.9 TYPE 2 DIABETES MELLITUS WITHOUT COMPLICATION, WITH LONG-TERM CURRENT USE OF INSULIN: ICD-10-CM

## 2024-01-05 DIAGNOSIS — E11.9 TYPE 2 DIABETES MELLITUS WITHOUT COMPLICATION: ICD-10-CM

## 2024-01-05 DIAGNOSIS — J01.90 ACUTE SINUSITIS, RECURRENCE NOT SPECIFIED, UNSPECIFIED LOCATION: Primary | ICD-10-CM

## 2024-01-05 DIAGNOSIS — Z79.4 TYPE 2 DIABETES MELLITUS WITHOUT COMPLICATION, WITH LONG-TERM CURRENT USE OF INSULIN: ICD-10-CM

## 2024-01-05 DIAGNOSIS — R05.9 COUGH, UNSPECIFIED TYPE: ICD-10-CM

## 2024-01-05 DIAGNOSIS — J02.9 PHARYNGITIS, UNSPECIFIED ETIOLOGY: ICD-10-CM

## 2024-01-05 DIAGNOSIS — E11.9 TYPE 2 DIABETES MELLITUS WITHOUT COMPLICATION, WITHOUT LONG-TERM CURRENT USE OF INSULIN: ICD-10-CM

## 2024-01-05 LAB
ALBUMIN SERPL BCP-MCNC: 3.8 G/DL (ref 3.5–5.2)
ALP SERPL-CCNC: 57 U/L (ref 55–135)
ALT SERPL W/O P-5'-P-CCNC: 30 U/L (ref 10–44)
ANION GAP SERPL CALC-SCNC: 11 MMOL/L (ref 8–16)
AST SERPL-CCNC: 20 U/L (ref 10–40)
BILIRUB SERPL-MCNC: 0.4 MG/DL (ref 0.1–1)
BUN SERPL-MCNC: 11 MG/DL (ref 6–20)
CALCIUM SERPL-MCNC: 9.3 MG/DL (ref 8.7–10.5)
CHLORIDE SERPL-SCNC: 102 MMOL/L (ref 95–110)
CHOLEST SERPL-MCNC: 180 MG/DL (ref 120–199)
CHOLEST/HDLC SERPL: 5.6 {RATIO} (ref 2–5)
CO2 SERPL-SCNC: 24 MMOL/L (ref 23–29)
CREAT SERPL-MCNC: 1.2 MG/DL (ref 0.5–1.4)
EST. GFR  (NO RACE VARIABLE): >60 ML/MIN/1.73 M^2
ESTIMATED AVG GLUCOSE: 235 MG/DL (ref 68–131)
ESTIMATED AVG GLUCOSE: 235 MG/DL (ref 68–131)
GLUCOSE SERPL-MCNC: 319 MG/DL (ref 70–110)
HBA1C MFR BLD: 9.8 % (ref 4–5.6)
HBA1C MFR BLD: 9.8 % (ref 4–5.6)
HDLC SERPL-MCNC: 32 MG/DL (ref 40–75)
HDLC SERPL: 17.8 % (ref 20–50)
LDLC SERPL CALC-MCNC: 102.6 MG/DL (ref 63–159)
NONHDLC SERPL-MCNC: 148 MG/DL
POTASSIUM SERPL-SCNC: 4.3 MMOL/L (ref 3.5–5.1)
PROT SERPL-MCNC: 8.1 G/DL (ref 6–8.4)
SODIUM SERPL-SCNC: 137 MMOL/L (ref 136–145)
TRIGL SERPL-MCNC: 227 MG/DL (ref 30–150)

## 2024-01-05 PROCEDURE — 1159F MED LIST DOCD IN RCRD: CPT | Mod: CPTII,S$GLB,, | Performed by: NURSE PRACTITIONER

## 2024-01-05 PROCEDURE — 80061 LIPID PANEL: CPT | Performed by: STUDENT IN AN ORGANIZED HEALTH CARE EDUCATION/TRAINING PROGRAM

## 2024-01-05 PROCEDURE — 80053 COMPREHEN METABOLIC PANEL: CPT | Performed by: NURSE PRACTITIONER

## 2024-01-05 PROCEDURE — 83036 HEMOGLOBIN GLYCOSYLATED A1C: CPT | Performed by: STUDENT IN AN ORGANIZED HEALTH CARE EDUCATION/TRAINING PROGRAM

## 2024-01-05 PROCEDURE — 99214 OFFICE O/P EST MOD 30 MIN: CPT | Mod: S$GLB,,, | Performed by: NURSE PRACTITIONER

## 2024-01-05 PROCEDURE — 3079F DIAST BP 80-89 MM HG: CPT | Mod: CPTII,S$GLB,, | Performed by: NURSE PRACTITIONER

## 2024-01-05 PROCEDURE — 99999 PR PBB SHADOW E&M-EST. PATIENT-LVL IV: CPT | Mod: PBBFAC,,, | Performed by: NURSE PRACTITIONER

## 2024-01-05 PROCEDURE — 36415 COLL VENOUS BLD VENIPUNCTURE: CPT | Performed by: STUDENT IN AN ORGANIZED HEALTH CARE EDUCATION/TRAINING PROGRAM

## 2024-01-05 PROCEDURE — 3008F BODY MASS INDEX DOCD: CPT | Mod: CPTII,S$GLB,, | Performed by: NURSE PRACTITIONER

## 2024-01-05 PROCEDURE — 3074F SYST BP LT 130 MM HG: CPT | Mod: CPTII,S$GLB,, | Performed by: NURSE PRACTITIONER

## 2024-01-05 RX ORDER — LEVOCETIRIZINE DIHYDROCHLORIDE 5 MG/1
5 TABLET, FILM COATED ORAL NIGHTLY
Qty: 30 TABLET | Refills: 11 | Status: SHIPPED | OUTPATIENT
Start: 2024-01-05 | End: 2024-02-07

## 2024-01-05 RX ORDER — AZITHROMYCIN 250 MG/1
TABLET, FILM COATED ORAL
Qty: 6 EACH | Refills: 0 | Status: SHIPPED | OUTPATIENT
Start: 2024-01-05 | End: 2024-01-10

## 2024-01-05 RX ORDER — PROMETHAZINE HYDROCHLORIDE AND DEXTROMETHORPHAN HYDROBROMIDE 6.25; 15 MG/5ML; MG/5ML
5 SYRUP ORAL NIGHTLY PRN
Qty: 118 ML | Refills: 0 | Status: SHIPPED | OUTPATIENT
Start: 2024-01-05 | End: 2024-01-15

## 2024-01-05 NOTE — PROGRESS NOTES
INTERNAL MEDICINE PROGRESS/URGENT CARE NOTE    CHIEF COMPLAINT     Chief Complaint   Patient presents with    Cough       HPI     Hany Devries is a 43 y.o. male who presents for an urgent/follow up visit today.    Started over 10 days ago with cough congestion sore throat body aches. All symptoms improved except still has a lingering sore throat, slight congestion, and nonproductive cough. Taking delsym. Was taking dayquil initially.     Hx of DM- last A1c was over 14. Taking insulin, rybelsus, metformin. Recent fasting sugars around 110.     On statin for HLD. No SE. Due for repeat lipid panel.     Past Medical History:  Past Medical History:   Diagnosis Date    Asthma     Bilateral pulmonary embolism     Diabetes mellitus, type 2     DVT (deep venous thrombosis)         Past Surgical History:  Past Surgical History:   Procedure Laterality Date    HERNIA REPAIR      VASECTOMY  4/2015        Allergies:  Review of patient's allergies indicates:  No Known Allergies    Home Medications:    Current Outpatient Medications:     apixaban (ELIQUIS) 5 mg Tab, Take 1 tablet (5 mg total) by mouth 2 (two) times daily., Disp: 180 tablet, Rfl: 3    atorvastatin (LIPITOR) 20 MG tablet, Take 1 tablet (20 mg total) by mouth once daily., Disp: 90 tablet, Rfl: 3    azithromycin (Z-AMRITA) 250 MG tablet, Take 2 tablets by mouth on day 1; Take 1 tablet by mouth on days 2-5, Disp: 6 each, Rfl: 0    blood sugar diagnostic Strp, To check BG 1 times daily, to use with insurance preferred meter, Disp: 100 each, Rfl: 3    blood-glucose meter kit, One insurance preferred glucometer to monitor glucose daily, Disp: 1 each, Rfl: 0    EScitalopram oxalate (LEXAPRO) 5 MG Tab, Take 1 tablet (5 mg total) by mouth once daily., Disp: 30 tablet, Rfl: 3    insulin detemir U-100, Levemir, (LEVEMIR FLEXPEN) 100 unit/mL (3 mL) InPn pen, Inject 25 Units into the skin every evening., Disp: 15 mL, Rfl: 3    lancets Misc, To check BG 1 times daily, to use with  "insurance preferred meter, Disp: 100 each, Rfl: 3    levocetirizine (XYZAL) 5 MG tablet, Take 1 tablet (5 mg total) by mouth every evening., Disp: 30 tablet, Rfl: 11    metFORMIN (GLUCOPHAGE-XR) 500 MG ER 24hr tablet, Take 2 tablets (1,000 mg total) by mouth 2 (two) times daily with meals., Disp: 360 tablet, Rfl: 3    promethazine-dextromethorphan (PROMETHAZINE-DM) 6.25-15 mg/5 mL Syrp, Take 5 mLs by mouth nightly as needed (cough)., Disp: 118 mL, Rfl: 0    semaglutide (RYBELSUS) 14 mg tablet, Take 1 tablet (14 mg total) by mouth once daily., Disp: 90 tablet, Rfl: 3    semaglutide (RYBELSUS) 14 mg tablet, Take 1 tablet (14 mg total) by mouth once daily., Disp: 90 tablet, Rfl: 3     Review of Systems:  Review of Systems   Constitutional:  Negative for chills and fever.   HENT:  Positive for postnasal drip and sore throat. Negative for ear pain and rhinorrhea.    Respiratory:  Positive for cough. Negative for shortness of breath and wheezing.    Cardiovascular:  Negative for chest pain.   Musculoskeletal:  Negative for myalgias.   Skin:  Negative for rash.   Allergic/Immunologic: Negative for environmental allergies.   Neurological:  Negative for headaches.         PHYSICAL EXAM     Vitals:    01/05/24 0804 01/05/24 0819   BP: (!) 140/80 126/80   BP Location: Left arm    Patient Position: Sitting    BP Method: Large (Manual)    Pulse: 97    SpO2: 96%    Weight: (!) 148.8 kg (328 lb)    Height: 5' 11" (1.803 m)       Body mass index is 45.75 kg/m².     Physical Exam  Vitals reviewed.   Constitutional:       Appearance: Normal appearance.   HENT:      Head: Normocephalic and atraumatic.      Right Ear: Tympanic membrane and ear canal normal.      Left Ear: Tympanic membrane and ear canal normal.      Mouth/Throat:      Mouth: Mucous membranes are moist.      Pharynx: Oropharynx is clear. Posterior oropharyngeal erythema present. No oropharyngeal exudate.   Eyes:      Conjunctiva/sclera: Conjunctivae normal.      Pupils: " Pupils are equal, round, and reactive to light.   Cardiovascular:      Rate and Rhythm: Normal rate and regular rhythm.   Pulmonary:      Effort: Pulmonary effort is normal.      Breath sounds: Normal breath sounds.   Musculoskeletal:      Cervical back: Normal range of motion and neck supple.   Lymphadenopathy:      Cervical: No cervical adenopathy.   Skin:     General: Skin is warm and dry.      Findings: No rash.   Neurological:      General: No focal deficit present.      Mental Status: He is alert.   Psychiatric:         Mood and Affect: Mood normal.         Behavior: Behavior normal.         LABS     Lab Results   Component Value Date    HGBA1C >14.0 (H) 04/21/2023     CMP  Sodium   Date Value Ref Range Status   05/22/2023 136 136 - 145 mmol/L Final     Potassium   Date Value Ref Range Status   05/22/2023 4.4 3.5 - 5.1 mmol/L Final     Chloride   Date Value Ref Range Status   05/22/2023 102 95 - 110 mmol/L Final     CO2   Date Value Ref Range Status   05/22/2023 25 23 - 29 mmol/L Final     Glucose   Date Value Ref Range Status   05/22/2023 372 (H) 70 - 110 mg/dL Final     BUN   Date Value Ref Range Status   05/22/2023 7 6 - 20 mg/dL Final     Creatinine   Date Value Ref Range Status   05/22/2023 1.0 0.5 - 1.4 mg/dL Final     Calcium   Date Value Ref Range Status   05/22/2023 9.7 8.7 - 10.5 mg/dL Final     Total Protein   Date Value Ref Range Status   05/22/2023 7.4 6.0 - 8.4 g/dL Final     Albumin   Date Value Ref Range Status   05/22/2023 3.8 3.5 - 5.2 g/dL Final     Total Bilirubin   Date Value Ref Range Status   05/22/2023 0.6 0.1 - 1.0 mg/dL Final     Comment:     For infants and newborns, interpretation of results should be based  on gestational age, weight and in agreement with clinical  observations.    Premature Infant recommended reference ranges:  Up to 24 hours.............<8.0 mg/dL  Up to 48 hours............<12.0 mg/dL  3-5 days..................<15.0 mg/dL  6-29 days.................<15.0  "mg/dL       Alkaline Phosphatase   Date Value Ref Range Status   05/22/2023 60 55 - 135 U/L Final     AST   Date Value Ref Range Status   05/22/2023 20 10 - 40 U/L Final     ALT   Date Value Ref Range Status   05/22/2023 23 10 - 44 U/L Final     Anion Gap   Date Value Ref Range Status   05/22/2023 9 8 - 16 mmol/L Final     Lab Results   Component Value Date    WBC 7.85 05/22/2023    HGB 12.8 (L) 05/22/2023    HCT 41.7 05/22/2023    MCV 83 05/22/2023     05/22/2023     No results found for: "CHOL"  No results found for: "HDL"  No results found for: "LDLCALC"  No results found for: "TRIG"  No results found for: "CHOLHDL"  No results found for: "TSH", "G4CRWHZ", "W8VANGW", "THYROIDAB"    ASSESSMENT     1. Acute sinusitis, recurrence not specified, unspecified location    2. Cough, unspecified type    3. Pharyngitis, unspecified etiology    4. Type 2 diabetes mellitus without complication, with long-term current use of insulin    5. Hyperlipidemia, unspecified hyperlipidemia type           PLAN  Given duration of symptoms, will tx empirically for underlying bacterial process with azithromycin. Cough meds sent for night. Can take mucinex DM or robitussin DM during day. Xyzal at night as well. Rest and hydrate well.   Will check labs today. Fasting today. Will call with results and any further instruction. Continue current meds.     1. Acute sinusitis, recurrence not specified, unspecified location  - azithromycin (Z-AMRITA) 250 MG tablet; Take 2 tablets by mouth on day 1; Take 1 tablet by mouth on days 2-5  Dispense: 6 each; Refill: 0  - levocetirizine (XYZAL) 5 MG tablet; Take 1 tablet (5 mg total) by mouth every evening.  Dispense: 30 tablet; Refill: 11  - promethazine-dextromethorphan (PROMETHAZINE-DM) 6.25-15 mg/5 mL Syrp; Take 5 mLs by mouth nightly as needed (cough).  Dispense: 118 mL; Refill: 0    2. Cough, unspecified type    3. Pharyngitis, unspecified etiology    4. Type 2 diabetes mellitus without " complication, with long-term current use of insulin  - Comprehensive Metabolic Panel; Future  - Lipid Panel; Future  - Hemoglobin A1C; Future  - Microalbumin/Creatinine Ratio, Urine; Future    5. Hyperlipidemia, unspecified hyperlipidemia type  - Comprehensive Metabolic Panel; Future  - Lipid Panel; Future  - Hemoglobin A1C; Future  - Microalbumin/Creatinine Ratio, Urine; Future       Follow up with PCP     Patient was counseled on when to seek emergent care. Patient's plan/treatment was discussed including medications and possible side effects. Verbalized understanding of all instructions.          JOE Bridges  Department of Internal Medicine - [facility]  01/05/2024

## 2024-01-05 NOTE — LETTER
January 5, 2024      [facility]  [address]  [facility] [address]  Phone: [phone]  Fax: [phone]       Patient: Hany Devries   YOB: 1980  Date of Visit: 01/05/2024    To Whom It May Concern:    Marie Devries  was at [facility] on 01/05/2024. The patient may return to work/school on 1/5/2024 with no restrictions. If you have any questions or concerns, or if I can be of further assistance, please do not hesitate to contact me.    Sincerely,     Patricia Walker NP

## 2024-01-05 NOTE — LETTER
January 5, 2024      [facility]  [address]  [facility] [address]  Phone: [phone]  Fax: [phone]       Patient: Hany Devries   YOB: 1980  Date of Visit: 01/05/2024    To Whom It May Concern:    Marie Devries  was at [facility] on 01/05/2024. The patient may return to work/school on 1/8/2024 with no restrictions. If you have any questions or concerns, or if I can be of further assistance, please do not hesitate to contact me.    Sincerely,    Patricia Walker NP

## 2024-01-08 ENCOUNTER — TELEPHONE (OUTPATIENT)
Dept: INTERNAL MEDICINE | Facility: CLINIC | Age: 44
End: 2024-01-08
Payer: COMMERCIAL

## 2024-01-08 NOTE — TELEPHONE ENCOUNTER
Good morning,    Wanted to see if you can schedule this patient. His A1c is still high despite meds including insulin.   Thank you!

## 2024-01-16 ENCOUNTER — TELEPHONE (OUTPATIENT)
Dept: INTERNAL MEDICINE | Facility: CLINIC | Age: 44
End: 2024-01-16
Payer: COMMERCIAL

## 2024-01-16 RX ORDER — INSULIN GLARGINE 300 [IU]/ML
20 INJECTION, SOLUTION SUBCUTANEOUS NIGHTLY
Qty: 6 ML | Refills: 3 | Status: SHIPPED | OUTPATIENT
Start: 2024-01-16 | End: 2025-01-15

## 2024-01-16 NOTE — TELEPHONE ENCOUNTER
----- Message from Vivian Qiu MA sent at 1/16/2024 12:20 PM CST -----  [employer] [facility] calling to speak with staff. Says the Levemir Flex Pen is not covered by insurance. The insurance sent alternatives Semglee Pen, Toujeo Solostar Pen and Toujeo Max Solostar Pen. Please call them back at [phone] Ref#: [id number].

## 2024-01-22 NOTE — PROGRESS NOTES
CHIEF COMPLAINT: Type 2 Diabetes     HPI: Mr. Hany Devries is a 43 y.o. male who was diagnosed with Type 2 DM in 2021 , A1c 6.5%.  In April of 2023, A1c >14%, currently 9.8%.   H/o dvts, on eliquis  Currently on basal insulin, rybelsus , metformin- had to stop r/t gi issues.  BG on labs 318 mg/dl   Familial hx type 2 dm , cva, sister type 2 dm   Blurry vision noted with higher BG   Lately bg  in am   H/o hypoglycemia 69 mg/dl   Interested in cgm.   Has missed 4 days of rybelsus recently     The patient location is: home  The chief complaint leading to consultation is: home     Visit type: audiovisual    Face to Face time with patient: 15, 20   minutes of total time spent on the encounter, which includes face to face time and non-face to face time preparing to see the patient (eg, review of tests), Obtaining and/or reviewing separately obtained history, Documenting clinical information in the electronic or other health record, Independently interpreting results (not separately reported) and communicating results to the patient/family/caregiver, or Care coordination (not separately reported).         Each patient to whom he or she provides medical services by telemedicine is:  (1) informed of the relationship between the physician and patient and the respective role of any other health care provider with respect to management of the patient; and (2) notified that he or she may decline to receive medical services by telemedicine and may withdraw from such care at any time.    Notes:        PREVIOUS DIABETES MEDICATIONS TRIED  Levemir   Ryblesus   Metformin xr     CURRENT DIABETIC MEDS: rybelsus 14 mg daily , levemir/toujeo 25 units at night, metformin xr   Injection 1x a day   Makes frequent changes to his/her insulin regimen on the basis of blood glucose data  Testing 4 x a day  Patient is willing and able to use the device  Demonstrated an understanding of the technology and is motivated to use CGM  Patient  expected to adhere to a comprehensive diabetes treatment plan and patient has adequate medical supervision  Patient experiences recurrent, unexplained, severe (lows of <50 mg/dl) hypoglycemia   Has multiple impaired awareness of hypoglycemia (hypoglycemia unawareness)  30 day log show at least 2 lows 50.    Diabetes Management Status    Statin: Taking  ACE/ARB: Not taking    Screening or Prevention Patient's value Goal Complete/Controlled?   HgA1C Testing and Control   Lab Results   Component Value Date    HGBA1C 9.8 (H) 01/05/2024    HGBA1C 9.8 (H) 01/05/2024      Annually/Less than 8% No   Lipid profile : 01/05/2024 Annually Yes   LDL control Lab Results   Component Value Date    LDLCALC 102.6 01/05/2024    Annually/Less than 100 mg/dl  No   Nephropathy screening Lab Results   Component Value Date    LABMICR 21.0 01/05/2024     Lab Results   Component Value Date    PROTEINUA Negative 01/19/2023    Annually Yes   Blood pressure BP Readings from Last 1 Encounters:   01/05/24 126/80    Less than 140/90 Yes   Dilated retinal exam Most Recent Eye Exam Date: Not Found Annually Yes   Foot exam   : 01/20/2023 Annually No     REVIEW OF SYSTEMS  General: no weakness, fatigue, or weight changes.   Eyes: no double + blurred vision, eye pain, or redness  Cardiovascular: no chest pain, palpitations, edema, or murmurs.   Respiratory: no cough or dyspnea.   GI: no heartburn, nausea, or changes in bowel patterns; good appetite.   Skin: no rashes, dryness, itching, or reactions at insulin injection sites.  Neuro: no numbness, tingling, tremors, or vertigo.   Endocrine: no polyuria, polydipsia, polyphagia, heat or cold intolerance.     Vital Signs  There were no vitals taken for this visit.    Hemoglobin A1C   Date Value Ref Range Status   01/05/2024 9.8 (H) 4.0 - 5.6 % Final     Comment:     ADA Screening Guidelines:  5.7-6.4%  Consistent with prediabetes  >or=6.5%  Consistent with diabetes    High levels of fetal hemoglobin  "interfere with the HbA1C  assay. Heterozygous hemoglobin variants (HbS, HgC, etc)do  not significantly interfere with this assay.   However, presence of multiple variants may affect accuracy.     01/05/2024 9.8 (H) 4.0 - 5.6 % Final     Comment:     ADA Screening Guidelines:  5.7-6.4%  Consistent with prediabetes  >or=6.5%  Consistent with diabetes    High levels of fetal hemoglobin interfere with the HbA1C  assay. Heterozygous hemoglobin variants (HbS, HgC, etc)do  not significantly interfere with this assay.   However, presence of multiple variants may affect accuracy.     04/21/2023 >14.0 (H) 4.0 - 5.6 % Final     Comment:     ADA Screening Guidelines:  5.7-6.4%  Consistent with prediabetes  >or=6.5%  Consistent with diabetes    High levels of fetal hemoglobin interfere with the HbA1C  assay. Heterozygous hemoglobin variants (HbS, HgC, etc)do  not significantly interfere with this assay.   However, presence of multiple variants may affect accuracy.          Chemistry        Component Value Date/Time     01/05/2024 0843    K 4.3 01/05/2024 0843     01/05/2024 0843    CO2 24 01/05/2024 0843    BUN 11 01/05/2024 0843    CREATININE 1.2 01/05/2024 0843     (H) 01/05/2024 0843        Component Value Date/Time    CALCIUM 9.3 01/05/2024 0843    ALKPHOS 57 01/05/2024 0843    AST 20 01/05/2024 0843    ALT 30 01/05/2024 0843    BILITOT 0.4 01/05/2024 0843           No results found for: "TSH"   Lab Results   Component Value Date    CHOL 180 01/05/2024     Lab Results   Component Value Date    HDL 32 (L) 01/05/2024     Lab Results   Component Value Date    LDLCALC 102.6 01/05/2024     Lab Results   Component Value Date    TRIG 227 (H) 01/05/2024     Lab Results   Component Value Date    CHOLHDL 17.8 (L) 01/05/2024         PHYSICAL EXAMINATION  Constitutional: Appears well, no distress      Diabetes Foot Exam: deferred         Assessment/Plan  1. Type 2 diabetes mellitus without complication, without " long-term current use of insulin  Hemoglobin A1C    blood-glucose sensor (FREESTYLE MARVEL 3 SENSOR) Gal[place]    tirzepatide 7.5 mg/0.5 mL PnIj      2. Class 3 severe obesity due to excess calories with serious comorbidity and body mass index (BMI) of 45.0 to 49.9 in adult        3. Chronic deep vein thrombosis (DVT) of other vein of both lower extremities        4. History of DVT (deep vein thrombosis)          1-4. Follow up in 3-4 months w/ me   Send toujeo  Add mounjaro 7.5 mg weekly   Stop rybelsus   Add marvel 3  Discussed dietary habits, stressors   On eliquis  F/u with vascular , cards   A1c goal less than 7%     FOLLOW UP  No follow-ups on file.     Time: 30 mins            Answers submitted by the patient for this visit:  Diabetes Questionnaire (Submitted on 1/24/2024)  Chief Complaint: Diabetes problem  Diabetes type: type 2  MedicAlert ID: No  Disease duration: 9 Months  blurred vision: No  chest pain: No  fatigue: No  foot paresthesias: No  foot ulcerations: No  polydipsia: No  polyphagia: No  polyuria: No  visual change: No  weakness: No  weight loss: No  Symptom course: stable  confusion: No  dizziness: No  headaches: No  hunger: No  mood changes: No  nervous/anxious: No  pallor: No  seizures: No  sleepiness: No  speech difficulty: No  sweats: No  tremors: No  blackouts: No  hospitalization: No  nocturnal hypoglycemia: No  required assistance: No  required glucagon: No  CVA: No  heart disease: No  impotence: No  nephropathy: No  peripheral neuropathy: No  PVD: No  retinopathy: No  autonomic neuropathy: No  CAD risks: no known risk factors  Current treatments: insulin injections  Treatment compliance: all of the time  Dose schedule: at bedtime  Given by: patient  Injection sites: abdominal wall  Home blood tests: 3-4 x per day  Home urines: <1 x per month  Monitoring compliance: good  Blood glucose trend: decreasing steadily  Weight trend: stable  Current diet: generally healthy  Meal planning:  none  Exercise: every other day  Dietitian visit: No  Eye exam current: Yes  Sees podiatrist: No

## 2024-01-24 ENCOUNTER — OFFICE VISIT (OUTPATIENT)
Dept: INTERNAL MEDICINE | Facility: CLINIC | Age: 44
End: 2024-01-24
Payer: COMMERCIAL

## 2024-01-24 DIAGNOSIS — Z86.718 HISTORY OF DVT (DEEP VEIN THROMBOSIS): ICD-10-CM

## 2024-01-24 DIAGNOSIS — E66.01 CLASS 3 SEVERE OBESITY DUE TO EXCESS CALORIES WITH SERIOUS COMORBIDITY AND BODY MASS INDEX (BMI) OF 45.0 TO 49.9 IN ADULT: ICD-10-CM

## 2024-01-24 DIAGNOSIS — I26.99 BILATERAL PULMONARY EMBOLISM: ICD-10-CM

## 2024-01-24 DIAGNOSIS — E11.65 TYPE 2 DIABETES MELLITUS WITH HYPERGLYCEMIA, WITHOUT LONG-TERM CURRENT USE OF INSULIN: ICD-10-CM

## 2024-01-24 DIAGNOSIS — I82.593 CHRONIC DEEP VEIN THROMBOSIS (DVT) OF OTHER VEIN OF BOTH LOWER EXTREMITIES: ICD-10-CM

## 2024-01-24 DIAGNOSIS — E11.9 TYPE 2 DIABETES MELLITUS WITHOUT COMPLICATION, WITHOUT LONG-TERM CURRENT USE OF INSULIN: Primary | ICD-10-CM

## 2024-01-24 PROCEDURE — 99214 OFFICE O/P EST MOD 30 MIN: CPT | Mod: 95,,, | Performed by: NURSE PRACTITIONER

## 2024-01-24 PROCEDURE — 3066F NEPHROPATHY DOC TX: CPT | Mod: CPTII,95,, | Performed by: NURSE PRACTITIONER

## 2024-01-24 PROCEDURE — 3061F NEG MICROALBUMINURIA REV: CPT | Mod: CPTII,95,, | Performed by: NURSE PRACTITIONER

## 2024-01-24 PROCEDURE — 1159F MED LIST DOCD IN RCRD: CPT | Mod: CPTII,95,, | Performed by: NURSE PRACTITIONER

## 2024-01-24 PROCEDURE — 3046F HEMOGLOBIN A1C LEVEL >9.0%: CPT | Mod: CPTII,95,, | Performed by: NURSE PRACTITIONER

## 2024-01-24 PROCEDURE — 1160F RVW MEDS BY RX/DR IN RCRD: CPT | Mod: CPTII,95,, | Performed by: NURSE PRACTITIONER

## 2024-01-24 RX ORDER — LANCETS
EACH MISCELLANEOUS
Qty: 100 EACH | Refills: 3 | Status: SHIPPED | OUTPATIENT
Start: 2024-01-24

## 2024-01-24 NOTE — TELEPHONE ENCOUNTER
Refill Routing Note   Medication(s) are not appropriate for processing by [facility] for the following reason(s):        Outside of protocol    [facility] action(s):  Route               Appointments  past 12m or future 3m with PCP    Date Provider   Last Visit   5/23/2023 Michael Hennessy MD   Next Visit   Visit date not found Michael Hennessy MD   ED visits in past 90 days: 0        Note composed:9:17 AM 01/24/2024

## 2024-01-24 NOTE — Clinical Note
Added mounjaro and dequan 3 Pas for mounjaro/dequan 3, on insulin Ursula -book pt in 3-3.5 months virtual or in person A1c prior Thanks Santos

## 2024-01-24 NOTE — TELEPHONE ENCOUNTER
Refill Routing Note   Medication(s) are not appropriate for processing by [facility] for the following reason(s):        No active prescription written by provider    [facility] action(s):  Route             Appointments  past 12m or future 3m with PCP    Date Provider   Last Visit   5/23/2023 Michael Hennessy MD   Next Visit   1/24/2024 Michael Hennessy MD   ED visits in past 90 days: 0        Note composed:9:30 AM 01/24/2024

## 2024-01-26 ENCOUNTER — TELEPHONE (OUTPATIENT)
Dept: INTERNAL MEDICINE | Facility: CLINIC | Age: 44
End: 2024-01-26
Payer: COMMERCIAL

## 2024-01-26 RX ORDER — BLOOD-GLUCOSE SENSOR
EACH MISCELLANEOUS
Qty: 2 EACH | Refills: 11 | Status: SHIPPED | OUTPATIENT
Start: 2024-01-26 | End: 2024-03-21 | Stop reason: SDUPTHER

## 2024-01-26 NOTE — TELEPHONE ENCOUNTER
Notified pt of message from insurer...  Prior authorization approved blood-glucose sensor (FREESTYLE MARVEL 3 SENSOR) [place]  Case ID: [id number]      Payer: [employer] Patient's Payer    [phone]   CaseId:[id number];Status:Approved;Review Type:Prior Auth;Coverage Start Date:12/27/2023;Coverage End Date:01/25/2025;   Approval Details    Authorized from December 27, 2023 to January 25, 2025        Pt has agreed to see the DM educator.

## 2024-01-31 DIAGNOSIS — E11.9 TYPE 2 DIABETES MELLITUS WITHOUT COMPLICATION, WITHOUT LONG-TERM CURRENT USE OF INSULIN: Primary | ICD-10-CM

## 2024-02-01 ENCOUNTER — TELEPHONE (OUTPATIENT)
Dept: DIABETES | Facility: CLINIC | Age: 44
End: 2024-02-01
Payer: COMMERCIAL

## 2024-02-06 ENCOUNTER — TELEPHONE (OUTPATIENT)
Dept: DIABETES | Facility: CLINIC | Age: 44
End: 2024-02-06
Payer: COMMERCIAL

## 2024-02-06 DIAGNOSIS — J01.90 ACUTE SINUSITIS, RECURRENCE NOT SPECIFIED, UNSPECIFIED LOCATION: ICD-10-CM

## 2024-02-07 ENCOUNTER — CLINICAL SUPPORT (OUTPATIENT)
Dept: DIABETES | Facility: CLINIC | Age: 44
End: 2024-02-07
Payer: COMMERCIAL

## 2024-02-07 VITALS — BODY MASS INDEX: 44.1 KG/M2 | WEIGHT: 315 LBS | HEIGHT: 71 IN

## 2024-02-07 DIAGNOSIS — E11.9 TYPE 2 DIABETES MELLITUS WITHOUT COMPLICATION, WITHOUT LONG-TERM CURRENT USE OF INSULIN: Primary | ICD-10-CM

## 2024-02-07 PROCEDURE — G0108 DIAB MANAGE TRN  PER INDIV: HCPCS | Mod: S$GLB,,, | Performed by: STUDENT IN AN ORGANIZED HEALTH CARE EDUCATION/TRAINING PROGRAM

## 2024-02-07 PROCEDURE — 99999 PR PBB SHADOW E&M-EST. PATIENT-LVL II: CPT | Mod: PBBFAC,,,

## 2024-02-07 RX ORDER — LEVOCETIRIZINE DIHYDROCHLORIDE 5 MG/1
5 TABLET, FILM COATED ORAL NIGHTLY
Qty: 90 TABLET | Refills: 1 | Status: SHIPPED | OUTPATIENT
Start: 2024-02-07

## 2024-02-07 NOTE — PROGRESS NOTES
"Diabetes Care Specialist Progress Note  Author: Nancy Shankar RN  Date: 2/7/2024    Program Intake  Reason for Diabetes Program Visit:: Initial Diabetes Assessment  Current diabetes risk level:: high  In the last 12 months, have you:: none  Permission to speak with others about care:: yes  Continuous Glucose Monitoring  Patient has CGM: Yes  Personal CGM type:: dequan 3- starting today    Lab Results   Component Value Date    HGBA1C 9.8 (H) 01/05/2024    HGBA1C 9.8 (H) 01/05/2024       Clinical    Weight: (!) 148.8 kg (328 lb)   Height: 5' 11" (180.3 cm)   Body mass index is 45.75 kg/m².    Patient Health Rating  Compared to other people your age, how would you rate your health?: Fair    Problem Review  Reviewed Problem List with Patient: yes  Active comorbidities affecting diabetes self-care.: yes  Comorbidities:  (pulmonary embolism, dvt)  Reviewed health maintenance: yes    Clinical Assessment  Current Diabetes Treatment: Diet, Injectable, Exercise, Insulin  Have you ever experienced hypoglycemia (low blood sugar)?: no  Have you ever experienced hyperglycemia (high blood sugar)?: yes  In the last month, how often have you experienced high blood sugar?: more than once a day  Are you able to tell when your blood sugar is high?: No (comment)  Have you ever been hospitalized because your blood sugar was high?: no    Medication Information  How do you obtain your medications?: Patient drives  How many days a week do you miss your medications?: Never  Do you sometimes have difficulty refilling your medications?: No  Medication adherence impacting ability to self-manage diabetes?: No    Labs  Do you have regular lab work to monitor your medications?: Yes  Type of Regular Lab Work: A1c, BMP  Where do you get your labs drawn?: [facility]  Lab Compliance Barriers: No    Nutritional Status  Diet: Regular  Meal Plan 24 Hour Recall: Breakfast, Lunch, Dinner, Snack  Meal Plan 24 Hour Recall - Breakfast: 2 oranges, watermelon, " grapes, chick aaron chicken biscuit  Meal Plan 24 Hour Recall - Lunch: 6 inch subway turkey sandwich  Meal Plan 24 Hour Recall - Dinner: baked chicken, asparagus, pasta  Meal Plan 24 Hour Recall - Snack: cheeseits, pretzels, jeff crackers- 100 trang snack packs    drinks water  Change in appetite?: No  Recent Changes in Weight: No Recent Weight Change  Current nutritional status an area of need that is impacting patient's ability to self-manage diabetes?: Yes    Additional Social History    Support  Does anyone support you with your diabetes care?: yes  Who supports you?: spouse  Who takes you to your medical appointments?: self  Does the current support meet the patient's needs?: Yes  Is Support an area impacting ability to self-manage diabetes?: No    Access to Mass Media & Technology  Does the patient have access to any of the following devices or technologies?: Smart phone, Home computer, Internet Access  Media or technology needs impacting ability to self-manage diabetes?: No    Cognitive/Behavioral Health  Alert and Oriented: Yes  Difficulty Thinking: No  Requires Prompting: No  Requires assistance for routine expression?: No  Cognitive or behavioral barriers impacting ability to self-manage diabetes?: No    Culture/Hoahaoism  Culture or Bahai beliefs that may impact ability to access healthcare: No    Communication  Language preference: English  Hearing Problems: No  Vision Problems: No  Communication needs impacting ability to self-manage diabetes?: No    Health Literacy  Preferred Learning Method: Face to Face, Demonstration, Reading Materials  How often do you need to have someone help you read instructions, pamphlets, or written material from your doctor or pharmacy?: Never  Health literacy needs impacting ability to self-manage diabetes?: No      Diabetes Self-Management Skills Assessment    Diabetes Disease Process/Treatment Options  Patient/caregiver able to state what happens when someone has  diabetes.: no  Patient/caregiver knows what type of diabetes they have.: yes  Diabetes Type : Type II  Patient/caregiver able to identify at least three signs and symptoms of diabetes.: no  Patient able to identify at least three risk factors for diabetes.: no  Diabetes Disease Process/Treatment Options: Skills Assessment Completed: Yes  Assessment indicates:: Knowledge deficit, Instruction Needed  Area of need?: Yes    Nutrition/Healthy Eating  Method of carbohydrate measurement:: no method  Patient can identify foods that impact blood sugar.: no (see comments)  Nutrition/Healthy Eating Skills Assessment Completed:: Yes  Assessment indicates:: Knowledge deficit, Instruction Needed  Area of need?: Yes    Physical Activity/Exercise  Patient's daily activity level:: lightly active  Patient formally exercises outside of work.: yes  Exercise Type: walking  Intensity: Low  Frequency: four or more times a week  Duration: 30 min  Patient can identify forms of physical activity.: yes  Stated forms of physical activity:: housework, yardwork  Patient can identify reasons why exercise/physical activity is important in diabetes management.: yes  Identified reasons:: improves blood circulation  Physical Activity/Exercise Skills Assessment Completed: : Yes  Assessment indicates:: Adequate understanding  Area of need?: No    Medications  Patient is able to describe current diabetes management routine.: yes  Diabetes management routine:: diet, exercise, injectable medications, insulin, oral medications (still taking Levemir at night till he gets Toujeo, waiting on Mounjaro approval and still taking Rybelsus until he starts Mounjaro)  Patient is able to identify current diabetes medications, dosages, and appropriate timing of medications.: yes  Patient understands the purpose of the medications taken for diabetes.: yes  Patient reports problems or concerns with current medication regimen.: no  Medication Skills Assessment  Completed:: Yes  Assessment indicates:: Adequate understanding  Area of need?: No    Home Blood Glucose Monitoring  Patient states that blood sugar is checked at home daily.: yes  Monitoring Method:: home glucometer  How often do you check your blood sugar?: Twice a day  When do you check your blood sugar?: Before breakfast, 2 hours after meal  When you check what is your typical blood sugar range? : 120-250  Blood glucose logs:: no  Blood glucose logs reviewed today?: no  Personal CGM type:: dequan 3- starting today  Home Blood Glucose Monitoring Skills Assessment Completed: : Yes  Assessment indicates:: Knowledge deficit, Instruction Needed  Area of need?: Yes    Acute Complications  Patient is able to identify types of acute complications: No  Acute Complications Skills Assessment Completed: : Yes  Assessment indicates:: Knowledge deficit, Instruction Needed  Area of need?: Yes    Chronic Complications  Patient can identify major chronic complications of diabetes.: no  Patient can identify ways to prevent or delay diabetes complications.: no  Patient is aware that having diabetes increases risk of heart disease?: No  Patient is aware that heart disease is the leading cause of death and disability in people with diabetes?: No  Patient able to state risk factors for heart disease?: No  Patient is taking statin?: Yes  Chronic Complications Skills Assessment Completed: : Yes  Assessment indicates:: Knowledge deficit, Instruction Needed  Area of need?: Yes    Psychosocial/Coping  Patient can identify ways of coping with chronic disease.: yes  Patient-stated ways of coping with chronic disease:: support from loved ones  Psychosocial/Coping Skills Assessment Completed: : Yes  Assessment indicates:: Adequate understanding  Area of need?: No      Assessment Summary and Plan    Based on today's diabetes care assessment, the following areas of need were identified:          2/7/2024    12:01 AM   Social   Support No   Access  to Mass Media/Tech No   Cognitive/Behavioral Health No   Culture/Latter-day No   Communication No   Health Literacy No            2/7/2024    12:01 AM   Clinical   Medication Adherence No   Lab Compliance No   Nutritional Status Yes            2/7/2024    12:01 AM   Diabetes Self-Management Skills   Diabetes Disease Process/Treatment Options YesProvided Diabetes management guide and provided instruction regarding SS and consume increased amount of carbohydrate foods and risk factors of diabetes.Instructed patient on what is Diabetes and the progression of uncontrolled diabetes. Discussed qualifying parameters of diabetes diagnosis. Reviewed/Instructed on disease process. Discussed current treatment options, especially dietary and lifestyle changes as well as possible medication additions and/or changes. Patient verbalizes understanding of received information/education.   Nutrition/Healthy Eating YesEmphasized importance of eliminating all SSB. Discussed SF drink options. Discussed carb vs non-carb foods and reviewed appropriate amounts of carbs to have at meals vs snacks. Recommended 45 - 60 gm carb at meals and 15 gm carb at snacks. Instructed on appropriate label reading and serving sizes of specific carb containing foods. Reviewed need to limit total/saturated fats. Discussed meal plans and snack ideas amenable to pt. Reviewed the plate method. Patient verbalizes understanding of received information/education.       Physical Activity/Exercise No   Medication No   Home Blood Glucose Monitoring YesProvided patient with blood glucose logs, reviewed appropriate timing and frequency to SMBG, education on parameters on when to notify provider and advised patient to bring logs to all appts with PCP/Endocrinologist/Diabetes Care Specialist.Reviewed the importance of self-monitoring blood glucose and keeping logs.Instructed on how to self-monitor blood glucose using a home glucometer, how to properly dispose of used  strips and lancets after use, and how to appropriately store meter and supplies.   Acute Complications YesDiscussed hypoglycemia vs hyperglycemia symptoms and discussed appropriate treatments for each. Reviewed that pt is at high risk of hypo with current medication regimen. Discussed general vs severe hyperglycemia and risk of DKA.   Chronic Complications YesProvided Diabetes management guide and provided instruction regarding the disease progression if diabetes is uncontrolled. Reviewed ways to decrease risk of chronic complications by healthy eating and having regular activity. Maintaining optimal blood glucose control. Following up with Diabetic team which includes PCP, CHON LEBRON (if applicable), yearly eye exam, yearly foot exam and Diabetes care specialist .Patient verbalizes understanding of received information/education. FREESTYLE MARVEL 3 CGM TRAINING     Patient referred to Diabetes Management today for Freestyle Marvel 2 continuous glucose sensor system training.   Patient arrived with his reader charged and 1 sensor for application or will be using their compatible smart phone.      Provided personal FreeStyle Marvel 3 training - reviewed the following with patient:   No fingersticks required but if feeling s/s that do not match with SG reading or in event of hypoglycemia confirm with fingerstick  To receive alarms, reader must be within 20 feet  Low and High alarms discussed  Vitamin C (ascorbic acid) more than 500 mg can cause false readings  Can bath, shower or swim  Sensor is 14-day wear  FDA approved for back of the arm wear only  Sensor should be removed prior to exposing it to X-rays  Site rotation  1 hour warm-up period  Scanning only required with new sensor start  Encouraged patient to scan more often - before each meal and before bed.      Explained in detail how the CGM works.  Explained graphs and arrow directions to determine the direction of blood sugar readings.     Caregiver/Patient  successfully set up reader/phone and placed sensor on back of upper  arm. Reviewed additional adhesive options if having any difficulty with sensor staying on.     If using smart device, instructed to go to [employer] in phone and connect with [facility] #[phone] to enable sharing directly with clinic.    Customer support number was provided and instructed to call them for any additional help or questions.    Psychosocial/Coping No          Today's interventions were provided through individual discussion, instruction, and written materials were provided.      Patient verbalized understanding of instruction and written materials.  Pt was able to return back demonstration of instructions today. Patient understood key points, needs reinforcement and further instruction.     Diabetes Self-Management Care Plan:    Today's Diabetes Self-Management Care Plan was developed with Hany's input. Hany has agreed to work toward the following goal(s) to improve his/her overall diabetes control.      Care Plan: Diabetes Management   Updates made since 1/8/2024 12:00 AM        Problem: Healthy Eating         Goal: Eat 3 meals daily with 45-60g/3-4 servings of Carbohydrate per meal.    Start Date: 2/7/2024   Expected End Date: 6/7/2024   Priority: High   Barriers: Knowledge deficit   Note:    2/7/24  Instructed pt on the food groups, how to read labels and count carbs. Pt was given sample menus and meal plans as examples. Discussed with pt the importance of eating 3 balanced and portioned meals per day. Pt has been eating too many carbs at his meals. Discussed with pt how to put his meals together. Discussed with pt what adjustments he needs to make to make his meals more balanced. Pt will work on this. Pt is now aware that he needs to read labels and measure foods. Instructed pt on the insertion and use of the Noreen 3- see notes       Task: Reviewed the sources and role of Carbohydrate, Protein, and Fat and how each nutrient  impacts blood sugar.         Task: Provided visual examples using dry measuring cups, food models, and other familiar objects such as computer mouse, deck or cards, tennis ball etc. to help with visualization of portions. Completed 2/7/2024        Task: Explained how to count carbohydrates using the food label and the use of dry measuring cups for accurate carb counting. Completed 2/7/2024        Task: Discussed strategies for choosing healthier menu options when dining out. Completed 2/7/2024        Task: Recommended replacing beverages containing high sugar content with noncaloric/sugar free options and/or water.         Task: Review the importance of balancing carbohydrates with each meal using portion control techniques to count servings of carbohydrate and label reading to identify serving size and amount of total carbs per serving. Completed 2/7/2024        Task: Provided Sample plate method and reviewed the use of the plate to estimate amounts of carbohydrate per meal. Completed 2/7/2024          Follow Up Plan     Follow up in about 4 weeks (around 3/6/2024) for evaluate meal planning and blood sugars.    Today's care plan and follow up schedule was discussed with patient.  Hany verbalized understanding of the care plan, goals, and agrees to follow up plan.        The patient was encouraged to communicate with his/her health care provider/physician and care team regarding his/her condition(s) and treatment.  I provided the patient with my contact information today and encouraged to contact me via phone or [facility] as needed.     Length of Visit   Total Time: 60 Minutes

## 2024-02-15 ENCOUNTER — DOCUMENTATION ONLY (OUTPATIENT)
Dept: INTERNAL MEDICINE | Facility: CLINIC | Age: 44
End: 2024-02-15
Payer: COMMERCIAL

## 2024-02-15 NOTE — PROGRESS NOTES
.Submitted the PA, see below :       AYO JEFFERS (Key: [id number])  PA Case ID #: [id number]  Need Help? Call us at [phone]  Archived on February 14  Outcome  N/A  This request has been approved using information available on the patient's profile. CaseId:[id number];Status:Approved;Review Type:Prior Auth;Coverage Start Date:01/07/2024;Coverage End Date:02/05/2025;  Drug  Mounjaro 7.5MG/0.5ML pen-injectors

## 2024-03-21 DIAGNOSIS — I26.99 BILATERAL PULMONARY EMBOLISM: ICD-10-CM

## 2024-03-21 DIAGNOSIS — E11.9 TYPE 2 DIABETES MELLITUS WITHOUT COMPLICATION, WITHOUT LONG-TERM CURRENT USE OF INSULIN: ICD-10-CM

## 2024-03-22 ENCOUNTER — PATIENT MESSAGE (OUTPATIENT)
Dept: INTERNAL MEDICINE | Facility: CLINIC | Age: 44
End: 2024-03-22
Payer: COMMERCIAL

## 2024-03-22 DIAGNOSIS — E11.9 TYPE 2 DIABETES MELLITUS WITHOUT COMPLICATION, WITHOUT LONG-TERM CURRENT USE OF INSULIN: ICD-10-CM

## 2024-03-22 RX ORDER — BLOOD-GLUCOSE SENSOR
EACH MISCELLANEOUS
Qty: 2 EACH | Refills: 11 | Status: SHIPPED | OUTPATIENT
Start: 2024-03-22

## 2024-03-22 NOTE — TELEPHONE ENCOUNTER
Refill Routing Note   Medication(s) are not appropriate for processing by [facility] for the following reason(s):        Outside of protocol    [facility] action(s):  Route               Appointments  past 12m or future 3m with PCP    Date Provider   Last Visit   5/23/2023 Michael Hennessy MD   Next Visit   Visit date not found Michael Hennessy MD   ED visits in past 90 days: 0        Note composed:7:43 AM 03/22/2024

## 2024-03-27 DIAGNOSIS — E11.9 TYPE 2 DIABETES MELLITUS WITHOUT COMPLICATION, UNSPECIFIED WHETHER LONG TERM INSULIN USE: ICD-10-CM

## 2024-04-02 ENCOUNTER — PATIENT OUTREACH (OUTPATIENT)
Dept: ADMINISTRATIVE | Facility: HOSPITAL | Age: 44
End: 2024-04-02
Payer: COMMERCIAL

## 2024-04-02 NOTE — PROGRESS NOTES
Health Maintenance Due   Topic Date Due    Eye Exam  Never done    Influenza Vaccine (1) 09/01/2023    COVID-19 Vaccine (3 - 2023-24 season) 09/01/2023    Hemoglobin A1c  04/05/2024     Triggered LINKS and reconciled immunizations. Updated Care Everywhere. Pt responded to outreach asking to schedule diabetic eye camera screening. Pt scheduled for diabetic eye cam on 4/11/2024 at 1:30pm per pt's preferences. Chart review completed.

## 2024-04-11 ENCOUNTER — TELEPHONE (OUTPATIENT)
Dept: INTERNAL MEDICINE | Facility: CLINIC | Age: 44
End: 2024-04-11
Payer: COMMERCIAL

## 2024-04-11 NOTE — TELEPHONE ENCOUNTER
Called patient to see if he would like to r/s his diabetic eye camera appt he missed today. [place] [facility] [place].   Lorrie Whiting RN HC

## 2024-04-16 DIAGNOSIS — E11.9 TYPE 2 DIABETES MELLITUS WITHOUT COMPLICATION, WITHOUT LONG-TERM CURRENT USE OF INSULIN: ICD-10-CM

## 2024-04-17 ENCOUNTER — PATIENT MESSAGE (OUTPATIENT)
Dept: INTERNAL MEDICINE | Facility: CLINIC | Age: 44
End: 2024-04-17
Payer: COMMERCIAL

## 2024-04-17 DIAGNOSIS — E11.9 TYPE 2 DIABETES MELLITUS WITHOUT COMPLICATION, WITHOUT LONG-TERM CURRENT USE OF INSULIN: ICD-10-CM

## 2024-04-17 RX ORDER — ORAL SEMAGLUTIDE 14 MG/1
14 TABLET ORAL DAILY
Qty: 90 TABLET | Refills: 1 | Status: SHIPPED | OUTPATIENT
Start: 2024-04-17 | End: 2024-04-25 | Stop reason: SDUPTHER

## 2024-04-17 RX ORDER — ORAL SEMAGLUTIDE 7 MG/1
TABLET ORAL
Qty: 90 TABLET | Refills: 0 | Status: SHIPPED | OUTPATIENT
Start: 2024-04-17 | End: 2024-04-17

## 2024-04-17 RX ORDER — METFORMIN HYDROCHLORIDE 500 MG/1
TABLET, EXTENDED RELEASE ORAL
Refills: 0 | OUTPATIENT
Start: 2024-04-17

## 2024-04-17 NOTE — TELEPHONE ENCOUNTER
No care due was identified.  [facility] Embedded Care Due Messages. Reference number: [id number].   4/17/2024 10:42:56 AM CDT

## 2024-04-17 NOTE — TELEPHONE ENCOUNTER
Pt states the mounjaro is on backorder and he is requesting an order for rybelsus. Pt states he only has 3 pills left.

## 2024-04-17 NOTE — TELEPHONE ENCOUNTER
Pt sent msg stating he is Im currently taking 14mg of rybelsus.    Please review portal msg and respond,  Thank You.

## 2024-04-17 NOTE — TELEPHONE ENCOUNTER
Refill Decision Note   Hany Devries  is requesting a refill authorization.  Brief Assessment and Rationale for Refill:  Quick Discontinue     Medication Therapy Plan:    Pharmacy is requesting new scripts for the following medications without required information, (sig/ frequency/qty/etc)      Medication Reconciliation Completed: No     Comments: Pharmacies have been requesting medications for patients without required information, (sig, frequency, qty, etc.). In addition, requests are sent for medication(s) pt. are currently not taking, and medications patients have never taken.    We have spoken to the pharmacies about these request types and advised their teams previously that we are unable to assess these New Script requests and require all details for these requests. This is a known issue and has been reported.     Note composed:11:43 AM 04/17/2024

## 2024-04-25 DIAGNOSIS — F41.9 ANXIETY: ICD-10-CM

## 2024-04-25 DIAGNOSIS — F32.A DEPRESSION, UNSPECIFIED DEPRESSION TYPE: ICD-10-CM

## 2024-04-25 RX ORDER — ORAL SEMAGLUTIDE 14 MG/1
14 TABLET ORAL DAILY
Qty: 90 TABLET | Refills: 1 | Status: SHIPPED | OUTPATIENT
Start: 2024-04-25 | End: 2024-05-05 | Stop reason: SDUPTHER

## 2024-04-25 RX ORDER — ESCITALOPRAM OXALATE 5 MG/1
5 TABLET ORAL DAILY
Qty: 90 TABLET | Refills: 0 | Status: SHIPPED | OUTPATIENT
Start: 2024-04-25

## 2024-04-25 NOTE — TELEPHONE ENCOUNTER
No care due was identified.  [facility] Embedded Care Due Messages. Reference number: [id number].   4/25/2024 11:31:32 AM CDT

## 2024-04-26 NOTE — TELEPHONE ENCOUNTER
Refill Decision Note   Hany Devries  is requesting a refill authorization.  Brief Assessment and Rationale for Refill:  Approve     Medication Therapy Plan:  LOV 5/23/23      Comments:     Note composed:10:35 PM 04/25/2024

## 2024-05-06 RX ORDER — ORAL SEMAGLUTIDE 14 MG/1
14 TABLET ORAL DAILY
Qty: 90 TABLET | Refills: 1 | Status: SHIPPED | OUTPATIENT
Start: 2024-05-06

## 2024-06-10 ENCOUNTER — PATIENT MESSAGE (OUTPATIENT)
Dept: INTERNAL MEDICINE | Facility: CLINIC | Age: 44
End: 2024-06-10
Payer: COMMERCIAL

## 2024-06-23 DIAGNOSIS — I26.99 BILATERAL PULMONARY EMBOLISM: ICD-10-CM

## 2024-06-23 DIAGNOSIS — F32.A DEPRESSION, UNSPECIFIED DEPRESSION TYPE: ICD-10-CM

## 2024-06-23 DIAGNOSIS — F41.9 ANXIETY: ICD-10-CM

## 2024-06-23 DIAGNOSIS — E11.9 TYPE 2 DIABETES MELLITUS WITHOUT COMPLICATION, WITHOUT LONG-TERM CURRENT USE OF INSULIN: ICD-10-CM

## 2024-06-23 NOTE — TELEPHONE ENCOUNTER
Care Due:                  Date            Visit Type   Department     Provider  --------------------------------------------------------------------------------                                NP -                              PRIMARY      [facility]  Last Visit: 05-      CARE ([facility])   MEDICINE       Michael Hennessy  Next Visit: None Scheduled  None         None Found                                                            Last  Test          Frequency    Reason                     Performed    Due Date  --------------------------------------------------------------------------------    Office Visit  15 months..  EScitalopram,              05-   08-                             atorvastatin, insulin,                             metFORMIN................    HBA1C.......  6 months...  insulin, metFORMIN.......  01- 07-    Health [facility] Embedded Care Due Messages. Reference number: [id number].   6/23/2024 11:39:17 AM CDT

## 2024-06-24 RX ORDER — ESCITALOPRAM OXALATE 5 MG/1
5 TABLET ORAL DAILY
Qty: 90 TABLET | Refills: 0 | Status: SHIPPED | OUTPATIENT
Start: 2024-06-24

## 2024-06-24 RX ORDER — BLOOD-GLUCOSE SENSOR
EACH MISCELLANEOUS
Qty: 2 EACH | Refills: 11 | Status: SHIPPED | OUTPATIENT
Start: 2024-06-24

## 2024-07-27 DIAGNOSIS — F32.A DEPRESSION, UNSPECIFIED DEPRESSION TYPE: ICD-10-CM

## 2024-07-27 DIAGNOSIS — F41.9 ANXIETY: ICD-10-CM

## 2024-07-27 NOTE — TELEPHONE ENCOUNTER
No care due was identified.  Health [facility] Embedded Care Due Messages. Reference number: [id number].   7/27/2024 9:48:26 AM CDT

## 2024-07-30 RX ORDER — ESCITALOPRAM OXALATE 5 MG/1
5 TABLET ORAL DAILY
Qty: 90 TABLET | Refills: 1 | Status: SHIPPED | OUTPATIENT
Start: 2024-07-30

## 2024-07-31 ENCOUNTER — OFFICE VISIT (OUTPATIENT)
Dept: INTERNAL MEDICINE | Facility: CLINIC | Age: 44
End: 2024-07-31
Payer: COMMERCIAL

## 2024-07-31 ENCOUNTER — PATIENT MESSAGE (OUTPATIENT)
Dept: INTERNAL MEDICINE | Facility: CLINIC | Age: 44
End: 2024-07-31

## 2024-07-31 ENCOUNTER — LAB VISIT (OUTPATIENT)
Dept: LAB | Facility: HOSPITAL | Age: 44
End: 2024-07-31
Payer: COMMERCIAL

## 2024-07-31 ENCOUNTER — TELEPHONE (OUTPATIENT)
Dept: INTERNAL MEDICINE | Facility: CLINIC | Age: 44
End: 2024-07-31

## 2024-07-31 VITALS
DIASTOLIC BLOOD PRESSURE: 90 MMHG | HEART RATE: 87 BPM | HEIGHT: 71 IN | WEIGHT: 271.19 LBS | SYSTOLIC BLOOD PRESSURE: 122 MMHG | OXYGEN SATURATION: 98 % | BODY MASS INDEX: 37.97 KG/M2

## 2024-07-31 DIAGNOSIS — E11.9 TYPE 2 DIABETES MELLITUS WITHOUT COMPLICATION, WITHOUT LONG-TERM CURRENT USE OF INSULIN: ICD-10-CM

## 2024-07-31 DIAGNOSIS — D68.51 HETEROZYGOUS FACTOR V LEIDEN MUTATION: ICD-10-CM

## 2024-07-31 DIAGNOSIS — E11.9 TYPE 2 DIABETES MELLITUS WITHOUT COMPLICATION, WITH LONG-TERM CURRENT USE OF INSULIN: ICD-10-CM

## 2024-07-31 DIAGNOSIS — Z00.00 ANNUAL PHYSICAL EXAM: ICD-10-CM

## 2024-07-31 DIAGNOSIS — R03.0 ELEVATED BLOOD PRESSURE READING: ICD-10-CM

## 2024-07-31 DIAGNOSIS — E11.65 TYPE 2 DIABETES MELLITUS WITH HYPERGLYCEMIA, WITHOUT LONG-TERM CURRENT USE OF INSULIN: ICD-10-CM

## 2024-07-31 DIAGNOSIS — I82.593 CHRONIC DEEP VEIN THROMBOSIS (DVT) OF OTHER VEIN OF BOTH LOWER EXTREMITIES: ICD-10-CM

## 2024-07-31 DIAGNOSIS — E66.01 MORBID OBESITY: ICD-10-CM

## 2024-07-31 DIAGNOSIS — Z79.4 TYPE 2 DIABETES MELLITUS WITHOUT COMPLICATION, WITH LONG-TERM CURRENT USE OF INSULIN: ICD-10-CM

## 2024-07-31 DIAGNOSIS — M54.50 ACUTE LEFT-SIDED LOW BACK PAIN, UNSPECIFIED WHETHER SCIATICA PRESENT: ICD-10-CM

## 2024-07-31 DIAGNOSIS — Z00.00 ANNUAL PHYSICAL EXAM: Primary | ICD-10-CM

## 2024-07-31 LAB
ALBUMIN SERPL BCP-MCNC: 3.7 G/DL (ref 3.5–5.2)
ALP SERPL-CCNC: 63 U/L (ref 55–135)
ALT SERPL W/O P-5'-P-CCNC: 30 U/L (ref 10–44)
ANION GAP SERPL CALC-SCNC: 16 MMOL/L (ref 8–16)
AST SERPL-CCNC: 15 U/L (ref 10–40)
BASOPHILS # BLD AUTO: 0.02 K/UL (ref 0–0.2)
BASOPHILS NFR BLD: 0.3 % (ref 0–1.9)
BILIRUB SERPL-MCNC: 0.4 MG/DL (ref 0.1–1)
BUN SERPL-MCNC: 8 MG/DL (ref 6–20)
CALCIUM SERPL-MCNC: 9.7 MG/DL (ref 8.7–10.5)
CHLORIDE SERPL-SCNC: 100 MMOL/L (ref 95–110)
CHOLEST SERPL-MCNC: 375 MG/DL (ref 120–199)
CHOLEST/HDLC SERPL: 11.4 {RATIO} (ref 2–5)
CO2 SERPL-SCNC: 19 MMOL/L (ref 23–29)
CREAT SERPL-MCNC: 1.1 MG/DL (ref 0.5–1.4)
DIFFERENTIAL METHOD BLD: ABNORMAL
EOSINOPHIL # BLD AUTO: 0.2 K/UL (ref 0–0.5)
EOSINOPHIL NFR BLD: 2.6 % (ref 0–8)
ERYTHROCYTE [DISTWIDTH] IN BLOOD BY AUTOMATED COUNT: 13.4 % (ref 11.5–14.5)
EST. GFR  (NO RACE VARIABLE): >60 ML/MIN/1.73 M^2
ESTIMATED AVG GLUCOSE: ABNORMAL MG/DL (ref 68–131)
GLUCOSE SERPL-MCNC: 289 MG/DL (ref 70–110)
HBA1C MFR BLD: >14 % (ref 4–5.6)
HCT VFR BLD AUTO: 44 % (ref 40–54)
HDLC SERPL-MCNC: 33 MG/DL (ref 40–75)
HDLC SERPL: 8.8 % (ref 20–50)
HGB BLD-MCNC: 13.9 G/DL (ref 14–18)
IMM GRANULOCYTES # BLD AUTO: 0.02 K/UL (ref 0–0.04)
IMM GRANULOCYTES NFR BLD AUTO: 0.3 % (ref 0–0.5)
LDLC SERPL CALC-MCNC: ABNORMAL MG/DL (ref 63–159)
LYMPHOCYTES # BLD AUTO: 2.1 K/UL (ref 1–4.8)
LYMPHOCYTES NFR BLD: 33.4 % (ref 18–48)
MCH RBC QN AUTO: 26.8 PG (ref 27–31)
MCHC RBC AUTO-ENTMCNC: 31.6 G/DL (ref 32–36)
MCV RBC AUTO: 85 FL (ref 82–98)
MONOCYTES # BLD AUTO: 0.5 K/UL (ref 0.3–1)
MONOCYTES NFR BLD: 7.2 % (ref 4–15)
NEUTROPHILS # BLD AUTO: 3.5 K/UL (ref 1.8–7.7)
NEUTROPHILS NFR BLD: 56.2 % (ref 38–73)
NONHDLC SERPL-MCNC: 342 MG/DL
NRBC BLD-RTO: 0 /100 WBC
PLATELET # BLD AUTO: 176 K/UL (ref 150–450)
PMV BLD AUTO: 13.8 FL (ref 9.2–12.9)
POTASSIUM SERPL-SCNC: 4.3 MMOL/L (ref 3.5–5.1)
PROT SERPL-MCNC: 7.9 G/DL (ref 6–8.4)
RBC # BLD AUTO: 5.19 M/UL (ref 4.6–6.2)
SODIUM SERPL-SCNC: 135 MMOL/L (ref 136–145)
TRIGL SERPL-MCNC: 1279 MG/DL (ref 30–150)
WBC # BLD AUTO: 6.22 K/UL (ref 3.9–12.7)

## 2024-07-31 PROCEDURE — 3061F NEG MICROALBUMINURIA REV: CPT | Mod: CPTII,S$GLB,, | Performed by: NURSE PRACTITIONER

## 2024-07-31 PROCEDURE — 1159F MED LIST DOCD IN RCRD: CPT | Mod: CPTII,S$GLB,, | Performed by: NURSE PRACTITIONER

## 2024-07-31 PROCEDURE — 99999 PR PBB SHADOW E&M-EST. PATIENT-LVL V: CPT | Mod: PBBFAC,,, | Performed by: NURSE PRACTITIONER

## 2024-07-31 PROCEDURE — 36415 COLL VENOUS BLD VENIPUNCTURE: CPT | Performed by: NURSE PRACTITIONER

## 2024-07-31 PROCEDURE — 83036 HEMOGLOBIN GLYCOSYLATED A1C: CPT | Performed by: NURSE PRACTITIONER

## 2024-07-31 PROCEDURE — 3008F BODY MASS INDEX DOCD: CPT | Mod: CPTII,S$GLB,, | Performed by: NURSE PRACTITIONER

## 2024-07-31 PROCEDURE — 3046F HEMOGLOBIN A1C LEVEL >9.0%: CPT | Mod: CPTII,S$GLB,, | Performed by: NURSE PRACTITIONER

## 2024-07-31 PROCEDURE — 3074F SYST BP LT 130 MM HG: CPT | Mod: CPTII,S$GLB,, | Performed by: NURSE PRACTITIONER

## 2024-07-31 PROCEDURE — 3080F DIAST BP >= 90 MM HG: CPT | Mod: CPTII,S$GLB,, | Performed by: NURSE PRACTITIONER

## 2024-07-31 PROCEDURE — 1160F RVW MEDS BY RX/DR IN RCRD: CPT | Mod: CPTII,S$GLB,, | Performed by: NURSE PRACTITIONER

## 2024-07-31 PROCEDURE — 3066F NEPHROPATHY DOC TX: CPT | Mod: CPTII,S$GLB,, | Performed by: NURSE PRACTITIONER

## 2024-07-31 PROCEDURE — 99396 PREV VISIT EST AGE 40-64: CPT | Mod: S$GLB,,, | Performed by: NURSE PRACTITIONER

## 2024-07-31 PROCEDURE — 80061 LIPID PANEL: CPT | Performed by: NURSE PRACTITIONER

## 2024-07-31 PROCEDURE — 80053 COMPREHEN METABOLIC PANEL: CPT | Performed by: NURSE PRACTITIONER

## 2024-07-31 PROCEDURE — 85025 COMPLETE CBC W/AUTO DIFF WBC: CPT | Performed by: NURSE PRACTITIONER

## 2024-07-31 RX ORDER — ATORVASTATIN CALCIUM 20 MG/1
20 TABLET, FILM COATED ORAL DAILY
Qty: 90 TABLET | Refills: 3 | Status: SHIPPED | OUTPATIENT
Start: 2024-07-31 | End: 2025-07-31

## 2024-07-31 NOTE — TELEPHONE ENCOUNTER
----- Message from TOMA Garcia, FNP sent at 7/31/2024 12:40 PM CDT -----  Regarding: a1c  Let pt know mounjaro was sent to [facility]   A1c is in for f/u  F/u needed in next 3-4 months  Thanks  Santos albright once started on mounjaro

## 2024-07-31 NOTE — PROGRESS NOTES
INTERNAL MEDICINE PROGRESS/URGENT CARE NOTE    CHIEF COMPLAINT     Chief Complaint   Patient presents with    Annual Exam    Hip Pain       HPI     Hany Devries is a 44 y.o. male who presents for an annual exam up visit today.    Feels well overall.     DM2- seeing Ms. Roche. On max dose metformin and Rybelsus and 20 units of Toujeo.  Last A1c 9.8.  Was previously over 14 a year ago.  He is due for his eye exam.  Recent KIRK negative for microalbuminuria.  He has lost a significant amount of weight since January.  Has really changed up his lifestyle habits regarding eating and exercising.  He walks 5 miles a day and drinks almost 2 gal of water a day.  Has cut out a lot of junk food as well.  Ms. Roche was trying to get him on tirzepatide but could not find it at any pharmacy.  He recently found out it was available at [facility] and is asking if we can start him on this instead of taking a daily pill of the rybelsus. He is on a statin.     Anxiety- has been stable on 5 mg of Lexapro.  He actually started seeing a therapist which he found very helpful too.    His only issue is he started having some left low back pain that radiates into the left buttocks into posterior hip.  Only occurs with certain movements and when he lies down on it.  No lower extremity numbness tingling or weakness.    He has a history of factor 5 Leiden and chronic DVT. Has been on Eliquis for years.  No issues with this      Wt Readings from Last 3 Encounters:   07/31/24 123 kg (271 lb 2.7 oz)   02/07/24 (!) 148.8 kg (328 lb)   01/05/24 (!) 148.8 kg (328 lb)   ]      Patient Active Problem List   Diagnosis    Elevated troponin    History of DVT (deep vein thrombosis)    Asthma    Type 2 diabetes mellitus without complication, without long-term current use of insulin    Class 3 severe obesity due to excess calories with serious comorbidity and body mass index (BMI) of 45.0 to 49.9 in adult    Heterozygous factor V Leiden mutation    Personal history  of pulmonary embolism    Chronic deep vein thrombosis (DVT) of other vein of both lower extremities        Past Medical History:  Past Medical History:   Diagnosis Date    Asthma     Bilateral pulmonary embolism     Diabetes mellitus, type 2     DVT (deep venous thrombosis)         Past Surgical History:  Past Surgical History:   Procedure Laterality Date    HERNIA REPAIR      VASECTOMY  4/2015        Allergies:  Review of patient's allergies indicates:  No Known Allergies    Home Medications:    Current Outpatient Medications:     apixaban (ELIQUIS) 5 mg Tab, Take 1 tablet (5 mg total) by mouth 2 (two) times daily., Disp: 180 tablet, Rfl: 3    blood sugar diagnostic Strp, To check BG 1 times daily, to use with insurance preferred meter, Disp: 100 each, Rfl: 3    blood-glucose meter kit, One insurance preferred glucometer to monitor glucose daily, Disp: 1 each, Rfl: 0    blood-glucose sensor (FREESTYLE MARVEL 3 SENSOR) [place], Change every 14 days, e 11.65, Disp: 2 each, Rfl: 11    EScitalopram oxalate (LEXAPRO) 5 MG Tab, Take 1 tablet (5 mg total) by mouth once daily., Disp: 90 tablet, Rfl: 1    insulin glargine U-300 conc (TOUJEO MAX SOLOSTAR) 300 unit/mL (3 mL) insulin pen, Inject 20 Units into the skin every evening., Disp: 6 mL, Rfl: 3    lancets Misc, To check BG 1 times daily, to use with insurance preferred meter, Disp: 100 each, Rfl: 3    levocetirizine (XYZAL) 5 MG tablet, Take 1 tablet (5 mg total) by mouth every evening., Disp: 90 tablet, Rfl: 1    metFORMIN (GLUCOPHAGE-XR) 500 MG ER 24hr tablet, Take 2 tablets (1,000 mg total) by mouth 2 (two) times daily with meals., Disp: 360 tablet, Rfl: 0    semaglutide (RYBELSUS) 14 mg tablet, Take 1 tablet (14 mg total) by mouth once daily. Take on empty stomach, wait 30 mins before eating or taking other meds, dx code e 11.65, Disp: 90 tablet, Rfl: 1    tirzepatide 7.5 mg/0.5 mL PnIj, Inject 7.5 mg weekly into skin, e 11.65, Disp: 4 Pen, Rfl: 6    atorvastatin  "(LIPITOR) 20 MG tablet, Take 1 tablet (20 mg total) by mouth once daily., Disp: 90 tablet, Rfl: 3     Review of Systems:  Review of Systems   Constitutional:  Negative for fever and unexpected weight change.   HENT:  Negative for hearing loss, rhinorrhea and trouble swallowing.    Eyes:  Negative for discharge.   Respiratory:  Negative for chest tightness and wheezing.    Cardiovascular:  Negative for chest pain and palpitations.   Gastrointestinal:  Negative for blood in stool, constipation, diarrhea and vomiting.   Endocrine: Negative for polydipsia and polyuria.   Genitourinary:  Negative for difficulty urinating, dysuria, frequency, hematuria and urgency.   Musculoskeletal:  Positive for arthralgias. Negative for joint swelling and neck pain.   Neurological:  Negative for weakness and headaches.   Psychiatric/Behavioral:  Negative for confusion.          PHYSICAL EXAM     Vitals:    07/31/24 1008 07/31/24 1045   BP: (!) 128/90 (!) 122/90   BP Location: Left arm    Patient Position: Sitting    BP Method: Large (Manual)    Pulse: 87    SpO2: 98%    Weight: 123 kg (271 lb 2.7 oz)    Height: 5' 11" (1.803 m)       Body mass index is 37.82 kg/m².     Physical Exam  Vitals reviewed.   Constitutional:       Appearance: Normal appearance.   HENT:      Head: Normocephalic.      Right Ear: Tympanic membrane normal.      Left Ear: Tympanic membrane normal.      Mouth/Throat:      Mouth: Mucous membranes are moist.      Pharynx: Oropharynx is clear.   Eyes:      Conjunctiva/sclera: Conjunctivae normal.      Pupils: Pupils are equal, round, and reactive to light.   Cardiovascular:      Rate and Rhythm: Normal rate and regular rhythm.      Heart sounds: Normal heart sounds.   Pulmonary:      Effort: Pulmonary effort is normal.      Breath sounds: Normal breath sounds.   Abdominal:      General: Bowel sounds are normal.      Palpations: Abdomen is soft.      Tenderness: There is no abdominal tenderness.   Skin:     General: " Skin is warm and dry.   Neurological:      Mental Status: He is alert and oriented to person, place, and time.   Psychiatric:         Mood and Affect: Mood normal.         Behavior: Behavior normal.       LABS     Lab Results   Component Value Date    HGBA1C 9.8 (H) 01/05/2024    HGBA1C 9.8 (H) 01/05/2024     CMP  Sodium   Date Value Ref Range Status   01/05/2024 137 136 - 145 mmol/L Final     Potassium   Date Value Ref Range Status   01/05/2024 4.3 3.5 - 5.1 mmol/L Final     Chloride   Date Value Ref Range Status   01/05/2024 102 95 - 110 mmol/L Final     CO2   Date Value Ref Range Status   01/05/2024 24 23 - 29 mmol/L Final     Glucose   Date Value Ref Range Status   01/05/2024 319 (H) 70 - 110 mg/dL Final     BUN   Date Value Ref Range Status   01/05/2024 11 6 - 20 mg/dL Final     Creatinine   Date Value Ref Range Status   01/05/2024 1.2 0.5 - 1.4 mg/dL Final     Calcium   Date Value Ref Range Status   01/05/2024 9.3 8.7 - 10.5 mg/dL Final     Total Protein   Date Value Ref Range Status   01/05/2024 8.1 6.0 - 8.4 g/dL Final     Albumin   Date Value Ref Range Status   01/05/2024 3.8 3.5 - 5.2 g/dL Final     Total Bilirubin   Date Value Ref Range Status   01/05/2024 0.4 0.1 - 1.0 mg/dL Final     Comment:     For infants and newborns, interpretation of results should be based  on gestational age, weight and in agreement with clinical  observations.    Premature Infant recommended reference ranges:  Up to 24 hours.............<8.0 mg/dL  Up to 48 hours............<12.0 mg/dL  3-5 days..................<15.0 mg/dL  6-29 days.................<15.0 mg/dL       Alkaline Phosphatase   Date Value Ref Range Status   01/05/2024 57 55 - 135 U/L Final     AST   Date Value Ref Range Status   01/05/2024 20 10 - 40 U/L Final     ALT   Date Value Ref Range Status   01/05/2024 30 10 - 44 U/L Final     Anion Gap   Date Value Ref Range Status   01/05/2024 11 8 - 16 mmol/L Final     Lab Results   Component Value Date    WBC 7.85  "05/22/2023    HGB 12.8 (L) 05/22/2023    HCT 41.7 05/22/2023    MCV 83 05/22/2023     05/22/2023     Lab Results   Component Value Date    CHOL 180 01/05/2024     Lab Results   Component Value Date    HDL 32 (L) 01/05/2024     Lab Results   Component Value Date    LDLCALC 102.6 01/05/2024     Lab Results   Component Value Date    TRIG 227 (H) 01/05/2024     Lab Results   Component Value Date    CHOLHDL 17.8 (L) 01/05/2024     No results found for: "TSH", "R2QHASK", "M0YMGRW", "THYROIDAB"    ASSESSMENT     1. Annual physical exam    2. Heterozygous factor V Leiden mutation    3. Type 2 diabetes mellitus with hyperglycemia, without long-term current use of insulin    4. Chronic deep vein thrombosis (DVT) of other vein of both lower extremities    5. Type 2 diabetes mellitus without complication, with long-term current use of insulin    6. Morbid obesity    7. Acute left-sided low back pain, unspecified whether sciatica present    8. Elevated blood pressure reading           PLAN  1. Annual physical exam  Discussed age and gender appropriate screenings at this visit and encouraged a healthy diet low in simple carbohydrates, and increased physical activity.  Counseled on medically appropriate vaccines based on age and current health condition.  Screening test reviewed and discussed with patient.    -     CBC Auto Differential; Future; Expected date: 07/31/2024  -     Comprehensive Metabolic Panel; Future; Expected date: 07/31/2024  -     Hemoglobin A1C; Future; Expected date: 07/31/2024  -     Lipid Panel; Future; Expected date: 07/31/2024    2. Heterozygous factor V Leiden mutation  Stable. On eliquis    3. Type 2 diabetes mellitus with hyperglycemia, without long-term current use of insulin  Repeat A1c. Continue current meds and will reach out to Santos eller regarding the mounjaro. He has a personal eye doctor he sees. Will schedule with them soon  -     atorvastatin (LIPITOR) 20 MG tablet; Take 1 tablet (20 mg " total) by mouth once daily.  Dispense: 90 tablet; Refill: 3  -     CBC Auto Differential; Future; Expected date: 07/31/2024  -     Comprehensive Metabolic Panel; Future; Expected date: 07/31/2024  -     Hemoglobin A1C; Future; Expected date: 07/31/2024  -     Lipid Panel; Future; Expected date: 07/31/2024    4. Chronic deep vein thrombosis (DVT) of other vein of both lower extremities  Stable. Continue current treatment.       5. Type 2 diabetes mellitus without complication, with long-term current use of insulin  A1c recently 9.8 6 months ago. Repeat today. Continue current tx  Overview:  Formatting of this note might be different from the original.  4/18/2021   A1c 6.5      6. Morbid obesity  Weight down significantly with meds and improved lifestyle changes. Congratulated him on this. Continue     7. Acute left-sided low back pain, unspecified whether sciatica present  Refer to PT. Tylenol prn  -     Ambulatory referral/consult to Physical/Occupational Therapy; Future; Expected date: 08/07/2024    8. Elevated blood pressure reading  Still elevated.  Recommended to monitor twice a day and log for 2 weeks.  He can message me a picture of a log in 2 weeks.          Follow up with PCP in 6 months    Patient's plan/treatment was discussed including medications and possible side effects. Verbalized understanding of all instructions.     This note was partly generated with [employer] voice recognition software. I apologize for any possible typographical errors.          JOE Bridges  Department of Internal Medicine - [facility]  07/31/2024

## 2024-08-01 ENCOUNTER — TELEPHONE (OUTPATIENT)
Dept: INTERNAL MEDICINE | Facility: CLINIC | Age: 44
End: 2024-08-01
Payer: COMMERCIAL

## 2024-08-01 NOTE — TELEPHONE ENCOUNTER
I spoke with patient.  His A1c is greater than 14 again.  Lipid panel elevated as well with a triglycerides ever 1000 which is likely just coming from his sugars.  He has been compliant with all of his meds including his insulin metformin rybelsus and lipitor. Fasting AM sugars range from 100-180.     Discussed for now I want him to increase his Lipitor to 40 mg (he will take 2 of what he just picked up) and will send a message to Ms. Roche regarding insulin titration and further med management and evaluation.

## 2024-08-13 ENCOUNTER — OFFICE VISIT (OUTPATIENT)
Dept: INTERNAL MEDICINE | Facility: CLINIC | Age: 44
End: 2024-08-13
Payer: COMMERCIAL

## 2024-08-13 VITALS
OXYGEN SATURATION: 97 % | WEIGHT: 271.81 LBS | HEART RATE: 94 BPM | HEIGHT: 71 IN | DIASTOLIC BLOOD PRESSURE: 83 MMHG | BODY MASS INDEX: 38.05 KG/M2 | SYSTOLIC BLOOD PRESSURE: 127 MMHG

## 2024-08-13 DIAGNOSIS — F41.9 ANXIETY: Primary | ICD-10-CM

## 2024-08-13 PROCEDURE — 3061F NEG MICROALBUMINURIA REV: CPT | Mod: CPTII,S$GLB,, | Performed by: NURSE PRACTITIONER

## 2024-08-13 PROCEDURE — 3074F SYST BP LT 130 MM HG: CPT | Mod: CPTII,S$GLB,, | Performed by: NURSE PRACTITIONER

## 2024-08-13 PROCEDURE — 3008F BODY MASS INDEX DOCD: CPT | Mod: CPTII,S$GLB,, | Performed by: NURSE PRACTITIONER

## 2024-08-13 PROCEDURE — 3046F HEMOGLOBIN A1C LEVEL >9.0%: CPT | Mod: CPTII,S$GLB,, | Performed by: NURSE PRACTITIONER

## 2024-08-13 PROCEDURE — 1159F MED LIST DOCD IN RCRD: CPT | Mod: CPTII,S$GLB,, | Performed by: NURSE PRACTITIONER

## 2024-08-13 PROCEDURE — 99214 OFFICE O/P EST MOD 30 MIN: CPT | Mod: S$GLB,,, | Performed by: NURSE PRACTITIONER

## 2024-08-13 PROCEDURE — 99999 PR PBB SHADOW E&M-EST. PATIENT-LVL IV: CPT | Mod: PBBFAC,,, | Performed by: NURSE PRACTITIONER

## 2024-08-13 PROCEDURE — 3079F DIAST BP 80-89 MM HG: CPT | Mod: CPTII,S$GLB,, | Performed by: NURSE PRACTITIONER

## 2024-08-13 PROCEDURE — 3066F NEPHROPATHY DOC TX: CPT | Mod: CPTII,S$GLB,, | Performed by: NURSE PRACTITIONER

## 2024-08-13 RX ORDER — ALPRAZOLAM 0.25 MG/1
0.25 TABLET ORAL 2 TIMES DAILY PRN
Qty: 30 TABLET | Refills: 0 | Status: SHIPPED | OUTPATIENT
Start: 2024-08-13 | End: 2024-09-12

## 2024-08-13 NOTE — PROGRESS NOTES
INTERNAL MEDICINE PROGRESS/URGENT CARE NOTE    CHIEF COMPLAINT     Chief Complaint   Patient presents with    Anxiety       HPI     Hany Devries is a 44 y.o. male who presents for an urgent/follow up visit today.    Patient with history of anxiety, on 5mg lexapro, currently seeing psychology for talk therapy. Presenting with exacerbation of anxiety over the past month due to increased work stress. He is the  of a [employer]. 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 He is fine outside of work. But having panic attacks before going to work. Patient plans to leave his current position early September to help with his anxiety. He does not wish to increase his lexapro today, but he is wondering if there is something else we can do for him in the interim. Gets headaches. No depression, SI or HI.  He has a good support system.     Patient Active Problem List   Diagnosis    Elevated troponin    History of DVT (deep vein thrombosis)    Asthma    Type 2 diabetes mellitus without complication, without long-term current use of insulin    Class 3 severe obesity due to excess calories with serious comorbidity and body mass index (BMI) of 45.0 to 49.9 in adult    Heterozygous factor V Leiden mutation    Personal history of pulmonary embolism    Chronic deep vein thrombosis (DVT) of other vein of both lower extremities        Past Medical History:  Past Medical History:   Diagnosis Date    Asthma     Bilateral pulmonary embolism     Diabetes mellitus, type 2     DVT (deep venous thrombosis)         Past Surgical History:  Past Surgical History:   Procedure Laterality Date    HERNIA REPAIR      VASECTOMY  4/2015        Allergies:  Review of patient's allergies indicates:  No Known Allergies    Home Medications:    Current Outpatient Medications:     apixaban (ELIQUIS) 5 mg Tab, Take 1 tablet (5 mg total) by mouth 2 (two) times daily., Disp: 180 tablet, Rfl: 3     atorvastatin (LIPITOR) 20 MG tablet, Take 1 tablet (20 mg total) by mouth once daily., Disp: 90 tablet, Rfl: 3    blood sugar diagnostic Strp, To check BG 1 times daily, to use with insurance preferred meter, Disp: 100 each, Rfl: 3    blood-glucose meter kit, One insurance preferred glucometer to monitor glucose daily, Disp: 1 each, Rfl: 0    blood-glucose sensor (FREESTYLE MARVEL 3 SENSOR) [place], Change every 14 days, e 11.65, Disp: 2 each, Rfl: 11    EScitalopram oxalate (LEXAPRO) 5 MG Tab, Take 1 tablet (5 mg total) by mouth once daily., Disp: 90 tablet, Rfl: 1    insulin glargine U-300 conc (TOUJEO MAX SOLOSTAR) 300 unit/mL (3 mL) insulin pen, Inject 20 Units into the skin every evening., Disp: 6 mL, Rfl: 3    lancets Misc, To check BG 1 times daily, to use with insurance preferred meter, Disp: 100 each, Rfl: 3    levocetirizine (XYZAL) 5 MG tablet, Take 1 tablet (5 mg total) by mouth every evening., Disp: 90 tablet, Rfl: 1    metFORMIN (GLUCOPHAGE-XR) 500 MG ER 24hr tablet, Take 2 tablets (1,000 mg total) by mouth 2 (two) times daily with meals., Disp: 360 tablet, Rfl: 0    tirzepatide 7.5 mg/0.5 mL PnIj, Inject 7.5 mg weekly into skin, e 11.65, Disp: 4 Pen, Rfl: 6    ALPRAZolam (XANAX) 0.25 MG tablet, Take 1 tablet (0.25 mg total) by mouth 2 (two) times daily as needed for Anxiety., Disp: 30 tablet, Rfl: 0     Review of Systems:  Review of Systems   Constitutional:  Negative for activity change and fever.   HENT:  Negative for hearing loss, rhinorrhea and trouble swallowing.    Eyes:  Negative for discharge and visual disturbance.   Respiratory:  Negative for chest tightness and wheezing.    Cardiovascular:  Negative for chest pain.   Gastrointestinal:  Negative for blood in stool, constipation, diarrhea and vomiting.   Endocrine: Negative for polydipsia and polyuria.   Genitourinary:  Negative for difficulty urinating, hematuria and urgency.   Musculoskeletal:  Negative for arthralgias, joint swelling and neck  "pain.   Neurological:  Positive for headaches. Negative for weakness.   Psychiatric/Behavioral:  Positive for dysphoric mood. Negative for confusion.          PHYSICAL EXAM     Vitals:    08/13/24 1330   BP: 127/83   Pulse: 94   SpO2: 97%   Weight: 123.3 kg (271 lb 13.2 oz)   Height: 5' 11" (1.803 m)      Body mass index is 37.91 kg/m².     Physical Exam  Vitals reviewed.   Constitutional:       Appearance: Normal appearance.   HENT:      Head: Normocephalic.   Eyes:      Conjunctiva/sclera: Conjunctivae normal.   Cardiovascular:      Rate and Rhythm: Normal rate.   Pulmonary:      Effort: Pulmonary effort is normal.   Skin:     General: Skin is warm and dry.   Neurological:      General: No focal deficit present.      Mental Status: He is alert.   Psychiatric:         Mood and Affect: Mood and affect normal.         Behavior: Behavior normal.         LABS     Lab Results   Component Value Date    HGBA1C >14.0 (H) 07/31/2024     CMP  Sodium   Date Value Ref Range Status   07/31/2024 135 (L) 136 - 145 mmol/L Final     Potassium   Date Value Ref Range Status   07/31/2024 4.3 3.5 - 5.1 mmol/L Final     Chloride   Date Value Ref Range Status   07/31/2024 100 95 - 110 mmol/L Final     CO2   Date Value Ref Range Status   07/31/2024 19 (L) 23 - 29 mmol/L Final     Glucose   Date Value Ref Range Status   07/31/2024 289 (H) 70 - 110 mg/dL Final     BUN   Date Value Ref Range Status   07/31/2024 8 6 - 20 mg/dL Final     Creatinine   Date Value Ref Range Status   07/31/2024 1.1 0.5 - 1.4 mg/dL Final     Calcium   Date Value Ref Range Status   07/31/2024 9.7 8.7 - 10.5 mg/dL Final     Total Protein   Date Value Ref Range Status   07/31/2024 7.9 6.0 - 8.4 g/dL Final     Comment:     *Specimen lipemic.  Result may be interfered by lipemia     Albumin   Date Value Ref Range Status   07/31/2024 3.7 3.5 - 5.2 g/dL Final     Total Bilirubin   Date Value Ref Range Status   07/31/2024 0.4 0.1 - 1.0 mg/dL Final     Comment:     For " "infants and newborns, interpretation of results should be based  on gestational age, weight and in agreement with clinical  observations.    Premature Infant recommended reference ranges:  Up to 24 hours.............<8.0 mg/dL  Up to 48 hours............<12.0 mg/dL  3-5 days..................<15.0 mg/dL  6-29 days.................<15.0 mg/dL       Alkaline Phosphatase   Date Value Ref Range Status   07/31/2024 63 55 - 135 U/L Final     AST   Date Value Ref Range Status   07/31/2024 15 10 - 40 U/L Final     ALT   Date Value Ref Range Status   07/31/2024 30 10 - 44 U/L Final     Anion Gap   Date Value Ref Range Status   07/31/2024 16 8 - 16 mmol/L Final     Lab Results   Component Value Date    WBC 6.22 07/31/2024    HGB 13.9 (L) 07/31/2024    HCT 44.0 07/31/2024    MCV 85 07/31/2024     07/31/2024     Lab Results   Component Value Date    CHOL 375 (H) 07/31/2024    CHOL 180 01/05/2024     Lab Results   Component Value Date    HDL 33 (L) 07/31/2024    HDL 32 (L) 01/05/2024     Lab Results   Component Value Date    LDLCALC Invalid, Trig>400.0 07/31/2024    LDLCALC 102.6 01/05/2024     Lab Results   Component Value Date    TRIG 1,279 (H) 07/31/2024    TRIG 227 (H) 01/05/2024     Lab Results   Component Value Date    CHOLHDL 8.8 (L) 07/31/2024    CHOLHDL 17.8 (L) 01/05/2024     No results found for: "TSH", "P0TMQAA", "M4MBCNH", "THYROIDAB"    ASSESSMENT     1. Anxiety           PLAN  Having adjustment issues at work. He is confident quitting his current job will ameliorate his symptoms. I think prn medication is appropriate for now since he plans to quit in 2-3 weeks. We discussed risk of misuse/abuse/addiction. Continue his current dose of the lexapro at 5 mg. He verbalized understanding. If anything changes, he will follow up.     1. Anxiety  - ALPRAZolam (XANAX) 0.25 MG tablet; Take 1 tablet (0.25 mg total) by mouth 2 (two) times daily as needed for Anxiety.  Dispense: 30 tablet; Refill: 0       Follow up with " PCP     Patient's plan/treatment was discussed including medications and possible side effects. Verbalized understanding of all instructions.     This note was partly generated with [employer] voice recognition software. I apologize for any possible typographical errors.          JOE Bridges  Department of Internal Medicine - [facility]  08/13/2024

## 2024-08-16 DIAGNOSIS — I26.99 BILATERAL PULMONARY EMBOLISM: ICD-10-CM

## 2024-08-16 DIAGNOSIS — E11.9 TYPE 2 DIABETES MELLITUS WITHOUT COMPLICATION, WITHOUT LONG-TERM CURRENT USE OF INSULIN: ICD-10-CM

## 2024-08-16 NOTE — TELEPHONE ENCOUNTER
No care due was identified.  [facility] Embedded Care Due Messages. Reference number: [id number].   8/16/2024 8:46:44 AM CDT

## 2024-08-17 NOTE — TELEPHONE ENCOUNTER
Refill Routing Note   Medication(s) are not appropriate for processing by [facility] for the following reason(s):        Outside of protocol: Eliquis  Required labs outdated:Metformin    [facility] action(s):  Route  Defer             Appointments  past 12m or future 3m with PCP    Date Provider   Last Visit   5/23/2023 Michael Hennessy MD   Next Visit   1/30/2025 Michael Hennessy MD   ED visits in past 90 days: 0        Note composed:7:42 PM 08/16/2024

## 2024-08-19 RX ORDER — METFORMIN HYDROCHLORIDE 500 MG/1
1000 TABLET, EXTENDED RELEASE ORAL 2 TIMES DAILY WITH MEALS
Qty: 360 TABLET | Refills: 0 | Status: SHIPPED | OUTPATIENT
Start: 2024-08-19

## 2024-09-29 ENCOUNTER — PATIENT MESSAGE (OUTPATIENT)
Dept: INTERNAL MEDICINE | Facility: CLINIC | Age: 44
End: 2024-09-29
Payer: COMMERCIAL

## 2024-09-30 DIAGNOSIS — E11.9 TYPE 2 DIABETES MELLITUS WITHOUT COMPLICATION, WITHOUT LONG-TERM CURRENT USE OF INSULIN: Primary | ICD-10-CM

## 2024-09-30 RX ORDER — TIRZEPATIDE 10 MG/.5ML
10 INJECTION, SOLUTION SUBCUTANEOUS
Qty: 4 PEN | Refills: 5 | Status: SHIPPED | OUTPATIENT
Start: 2024-09-30

## 2024-11-26 ENCOUNTER — PATIENT MESSAGE (OUTPATIENT)
Dept: INTERNAL MEDICINE | Facility: CLINIC | Age: 44
End: 2024-11-26

## 2024-12-26 ENCOUNTER — PATIENT OUTREACH (OUTPATIENT)
Dept: ADMINISTRATIVE | Facility: HOSPITAL | Age: 44
End: 2024-12-26

## 2024-12-26 NOTE — PROGRESS NOTES
Chart reviewed and updated. Reconciled immunizations.  Attempted to contact pt to schedule diabetic eye cam exam, no success lvm and portal msg.      Cinthya RUSSELL, Medical Assistant  Panel Care Coordinator  [facility]

## 2025-01-28 ENCOUNTER — PATIENT OUTREACH (OUTPATIENT)
Dept: INTERNAL MEDICINE | Facility: CLINIC | Age: 45
End: 2025-01-28

## 2025-01-28 DIAGNOSIS — E11.9 TYPE 2 DIABETES MELLITUS WITHOUT COMPLICATION, WITHOUT LONG-TERM CURRENT USE OF INSULIN: Primary | ICD-10-CM

## 2025-02-20 ENCOUNTER — PATIENT MESSAGE (OUTPATIENT)
Dept: ADMINISTRATIVE | Facility: HOSPITAL | Age: 45
End: 2025-02-20

## 2025-02-26 ENCOUNTER — PATIENT OUTREACH (OUTPATIENT)
Dept: ADMINISTRATIVE | Facility: HOSPITAL | Age: 45
End: 2025-02-26

## 2025-02-26 DIAGNOSIS — E11.9 TYPE 2 DIABETES MELLITUS WITHOUT COMPLICATION, WITHOUT LONG-TERM CURRENT USE OF INSULIN: Primary | ICD-10-CM

## 2025-02-26 NOTE — PROGRESS NOTES
Chart reviewed and updated. Reconciled immunizations, health maintenance and care everywhere.  Placed microalbumin.      Cinthya Thrasher [facility]  Panel Care Coordinator  [facility]  [phone]  For Gerhard, Michael ZAMORA MD

## 2025-04-28 ENCOUNTER — PATIENT OUTREACH (OUTPATIENT)
Dept: ADMINISTRATIVE | Facility: HOSPITAL | Age: 45
End: 2025-04-28

## 2025-04-28 DIAGNOSIS — E11.9 ENCOUNTER FOR DIABETIC FOOT EXAM: Primary | ICD-10-CM

## 2025-05-01 ENCOUNTER — PATIENT OUTREACH (OUTPATIENT)
Dept: ADMINISTRATIVE | Facility: HOSPITAL | Age: 45
End: 2025-05-01

## 2025-05-01 DIAGNOSIS — Z00.00 HEALTHCARE MAINTENANCE: Primary | ICD-10-CM

## 2025-05-01 NOTE — PROGRESS NOTES
Chart reviewed and updated. Reconciled immunizations, health maintenance and care everywhere.  Placed other lab and sent portal msg for all scheduling.      Cinthya Thrasher [facility]  Panel Care Coordinator  [facility]  [phone]  For Gerhard, Michael ZAMORA MD

## 2025-05-10 ENCOUNTER — HOSPITAL ENCOUNTER (EMERGENCY)
Facility: OTHER | Age: 45
Discharge: HOME | End: 2025-05-10
Attending: EMERGENCY MEDICINE

## 2025-05-10 VITALS
HEART RATE: 72 BPM | HEIGHT: 71 IN | WEIGHT: 300 LBS | TEMPERATURE: 99 F | SYSTOLIC BLOOD PRESSURE: 147 MMHG | DIASTOLIC BLOOD PRESSURE: 71 MMHG | BODY MASS INDEX: 42 KG/M2 | RESPIRATION RATE: 18 BRPM | OXYGEN SATURATION: 96 %

## 2025-05-10 DIAGNOSIS — N20.0 KIDNEY STONE: ICD-10-CM

## 2025-05-10 DIAGNOSIS — N23 RENAL COLIC: Primary | ICD-10-CM

## 2025-05-10 LAB
ABSOLUTE EOSINOPHIL (OHS): 0.21 K/UL
ABSOLUTE MONOCYTE (OHS): 0.7 K/UL (ref 0.3–1)
ABSOLUTE NEUTROPHIL COUNT (OHS): 4.41 K/UL (ref 1.8–7.7)
ALBUMIN SERPL BCP-MCNC: 4 G/DL (ref 3.5–5.2)
ALP SERPL-CCNC: 52 UNIT/L (ref 40–150)
ALT SERPL W/O P-5'-P-CCNC: 22 UNIT/L (ref 10–44)
ANION GAP (OHS): 14 MMOL/L (ref 8–16)
AST SERPL-CCNC: 18 UNIT/L (ref 11–45)
BACTERIA #/AREA URNS AUTO: ABNORMAL /HPF
BASOPHILS # BLD AUTO: 0.02 K/UL
BASOPHILS NFR BLD AUTO: 0.2 %
BILIRUB SERPL-MCNC: 0.5 MG/DL (ref 0.1–1)
BILIRUB UR QL STRIP.AUTO: NEGATIVE
BUN SERPL-MCNC: 24 MG/DL (ref 6–20)
CALCIUM SERPL-MCNC: 8.9 MG/DL (ref 8.7–10.5)
CHLORIDE SERPL-SCNC: 102 MMOL/L (ref 95–110)
CLARITY UR: CLEAR
CO2 SERPL-SCNC: 20 MMOL/L (ref 23–29)
COLOR UR AUTO: COLORLESS
CREAT SERPL-MCNC: 1.4 MG/DL (ref 0.5–1.4)
ERYTHROCYTE [DISTWIDTH] IN BLOOD BY AUTOMATED COUNT: 12.5 % (ref 11.5–14.5)
GFR SERPLBLD CREATININE-BSD FMLA CKD-EPI: >60 ML/MIN/1.73/M2
GLUCOSE SERPL-MCNC: 408 MG/DL (ref 70–110)
GLUCOSE UR QL STRIP: ABNORMAL
HCT VFR BLD AUTO: 42.4 % (ref 40–54)
HGB BLD-MCNC: 14.2 GM/DL (ref 14–18)
HGB UR QL STRIP: ABNORMAL
HOLD SPECIMEN: NORMAL
IMM GRANULOCYTES # BLD AUTO: 0.02 K/UL (ref 0–0.04)
IMM GRANULOCYTES NFR BLD AUTO: 0.2 % (ref 0–0.5)
KETONES UR QL STRIP: NEGATIVE
LEUKOCYTE ESTERASE UR QL STRIP: NEGATIVE
LIPASE SERPL-CCNC: 78 U/L (ref 4–60)
LYMPHOCYTES # BLD AUTO: 3.15 K/UL (ref 1–4.8)
MAGNESIUM SERPL-MCNC: 1.7 MG/DL (ref 1.6–2.6)
MCH RBC QN AUTO: 26.9 PG (ref 27–31)
MCHC RBC AUTO-ENTMCNC: 33.5 G/DL (ref 32–36)
MCV RBC AUTO: 81 FL (ref 82–98)
MICROSCOPIC COMMENT: ABNORMAL
NITRITE UR QL STRIP: NEGATIVE
NUCLEATED RBC (/100WBC) (OHS): 0 /100 WBC
PH UR STRIP: 5 [PH]
PLATELET # BLD AUTO: 190 K/UL (ref 150–450)
PMV BLD AUTO: 11.7 FL (ref 9.2–12.9)
POCT GLUCOSE: 390 MG/DL (ref 70–110)
POTASSIUM SERPL-SCNC: 4 MMOL/L (ref 3.5–5.1)
PROT SERPL-MCNC: 8.2 GM/DL (ref 6–8.4)
PROT UR QL STRIP: NEGATIVE
RBC # BLD AUTO: 5.27 M/UL (ref 4.6–6.2)
RBC #/AREA URNS AUTO: >100 /HPF (ref 0–4)
RELATIVE EOSINOPHIL (OHS): 2.5 %
RELATIVE LYMPHOCYTE (OHS): 37 % (ref 18–48)
RELATIVE MONOCYTE (OHS): 8.2 % (ref 4–15)
RELATIVE NEUTROPHIL (OHS): 51.9 % (ref 38–73)
SODIUM SERPL-SCNC: 136 MMOL/L (ref 136–145)
SP GR UR STRIP: 1.03
SQUAMOUS #/AREA URNS AUTO: 1 /HPF
UROBILINOGEN UR STRIP-ACNC: NEGATIVE EU/DL
WBC # BLD AUTO: 8.51 K/UL (ref 3.9–12.7)
WBC #/AREA URNS AUTO: 1 /HPF (ref 0–5)
YEAST UR QL AUTO: ABNORMAL /HPF

## 2025-05-10 PROCEDURE — 25000003 PHARM REV CODE 250: Performed by: EMERGENCY MEDICINE

## 2025-05-10 PROCEDURE — 96376 TX/PRO/DX INJ SAME DRUG ADON: CPT

## 2025-05-10 PROCEDURE — 85025 COMPLETE CBC W/AUTO DIFF WBC: CPT | Performed by: EMERGENCY MEDICINE

## 2025-05-10 PROCEDURE — 96374 THER/PROPH/DIAG INJ IV PUSH: CPT

## 2025-05-10 PROCEDURE — 81003 URINALYSIS AUTO W/O SCOPE: CPT | Performed by: EMERGENCY MEDICINE

## 2025-05-10 PROCEDURE — 82040 ASSAY OF SERUM ALBUMIN: CPT | Performed by: EMERGENCY MEDICINE

## 2025-05-10 PROCEDURE — 63600175 PHARM REV CODE 636 W HCPCS: Performed by: EMERGENCY MEDICINE

## 2025-05-10 PROCEDURE — 96361 HYDRATE IV INFUSION ADD-ON: CPT

## 2025-05-10 PROCEDURE — 99285 EMERGENCY DEPT VISIT HI MDM: CPT | Mod: 25

## 2025-05-10 PROCEDURE — 96375 TX/PRO/DX INJ NEW DRUG ADDON: CPT

## 2025-05-10 PROCEDURE — 83735 ASSAY OF MAGNESIUM: CPT | Performed by: EMERGENCY MEDICINE

## 2025-05-10 PROCEDURE — 83690 ASSAY OF LIPASE: CPT | Performed by: EMERGENCY MEDICINE

## 2025-05-10 PROCEDURE — 82962 GLUCOSE BLOOD TEST: CPT

## 2025-05-10 RX ORDER — HYDROMORPHONE HYDROCHLORIDE 1 MG/ML
1 INJECTION, SOLUTION INTRAMUSCULAR; INTRAVENOUS; SUBCUTANEOUS
Refills: 0 | Status: COMPLETED | OUTPATIENT
Start: 2025-05-10 | End: 2025-05-10

## 2025-05-10 RX ORDER — TAMSULOSIN HYDROCHLORIDE 0.4 MG/1
0.4 CAPSULE ORAL
Status: COMPLETED | OUTPATIENT
Start: 2025-05-10 | End: 2025-05-10

## 2025-05-10 RX ORDER — HYDROMORPHONE HYDROCHLORIDE 1 MG/ML
1 INJECTION, SOLUTION INTRAMUSCULAR; INTRAVENOUS; SUBCUTANEOUS
Status: COMPLETED | OUTPATIENT
Start: 2025-05-10 | End: 2025-05-10

## 2025-05-10 RX ORDER — ONDANSETRON 4 MG/1
4 TABLET, ORALLY DISINTEGRATING ORAL EVERY 6 HOURS PRN
Qty: 9 TABLET | Refills: 0 | Status: SHIPPED | OUTPATIENT
Start: 2025-05-10

## 2025-05-10 RX ORDER — KETOROLAC TROMETHAMINE 30 MG/ML
10 INJECTION, SOLUTION INTRAMUSCULAR; INTRAVENOUS
Status: COMPLETED | OUTPATIENT
Start: 2025-05-10 | End: 2025-05-10

## 2025-05-10 RX ORDER — NAPROXEN 500 MG/1
500 TABLET ORAL 2 TIMES DAILY WITH MEALS
Qty: 20 TABLET | Refills: 0 | Status: SHIPPED | OUTPATIENT
Start: 2025-05-10 | End: 2025-05-20

## 2025-05-10 RX ORDER — MORPHINE SULFATE 4 MG/ML
4 INJECTION, SOLUTION INTRAMUSCULAR; INTRAVENOUS
Refills: 0 | Status: COMPLETED | OUTPATIENT
Start: 2025-05-10 | End: 2025-05-10

## 2025-05-10 RX ORDER — OXYCODONE AND ACETAMINOPHEN 5; 325 MG/1; MG/1
2 TABLET ORAL
Refills: 0 | Status: COMPLETED | OUTPATIENT
Start: 2025-05-10 | End: 2025-05-10

## 2025-05-10 RX ORDER — TAMSULOSIN HYDROCHLORIDE 0.4 MG/1
0.4 CAPSULE ORAL DAILY
Qty: 30 CAPSULE | Refills: 11 | Status: SHIPPED | OUTPATIENT
Start: 2025-05-10 | End: 2026-05-10

## 2025-05-10 RX ORDER — ONDANSETRON HYDROCHLORIDE 2 MG/ML
4 INJECTION, SOLUTION INTRAVENOUS
Status: COMPLETED | OUTPATIENT
Start: 2025-05-10 | End: 2025-05-10

## 2025-05-10 RX ORDER — OXYCODONE AND ACETAMINOPHEN 5; 325 MG/1; MG/1
1 TABLET ORAL EVERY 6 HOURS PRN
Qty: 12 TABLET | Refills: 0 | Status: SHIPPED | OUTPATIENT
Start: 2025-05-10

## 2025-05-10 RX ADMIN — KETOROLAC TROMETHAMINE 10 MG: 30 INJECTION, SOLUTION INTRAMUSCULAR at 05:05

## 2025-05-10 RX ADMIN — KETOROLAC TROMETHAMINE 10 MG: 30 INJECTION, SOLUTION INTRAMUSCULAR at 08:05

## 2025-05-10 RX ADMIN — SODIUM CHLORIDE 1000 ML: 9 INJECTION, SOLUTION INTRAVENOUS at 05:05

## 2025-05-10 RX ADMIN — TAMSULOSIN HYDROCHLORIDE 0.4 MG: 0.4 CAPSULE ORAL at 08:05

## 2025-05-10 RX ADMIN — MORPHINE SULFATE 4 MG: 4 INJECTION INTRAVENOUS at 05:05

## 2025-05-10 RX ADMIN — HYDROMORPHONE HYDROCHLORIDE 1 MG: 1 INJECTION, SOLUTION INTRAMUSCULAR; INTRAVENOUS; SUBCUTANEOUS at 08:05

## 2025-05-10 RX ADMIN — SODIUM CHLORIDE 1000 ML: 9 INJECTION, SOLUTION INTRAVENOUS at 08:05

## 2025-05-10 RX ADMIN — HYDROMORPHONE HYDROCHLORIDE 1 MG: 1 INJECTION, SOLUTION INTRAMUSCULAR; INTRAVENOUS; SUBCUTANEOUS at 07:05

## 2025-05-10 RX ADMIN — INSULIN HUMAN 6 UNITS: 100 INJECTION, SOLUTION PARENTERAL at 09:05

## 2025-05-10 RX ADMIN — OXYCODONE HYDROCHLORIDE AND ACETAMINOPHEN 2 TABLET: 5; 325 TABLET ORAL at 09:05

## 2025-05-10 RX ADMIN — ONDANSETRON 4 MG: 2 INJECTION INTRAMUSCULAR; INTRAVENOUS at 05:05

## 2025-05-10 NOTE — DISCHARGE INSTRUCTIONS
Strain all urine, and increase fluid intake.  Save your stone if you pass it, and bring it with you to your urology appointment.  Return to the ER immediately if you experience fever, uncontrollable vomiting, or uncontrollable pain.

## 2025-05-10 NOTE — ED PROVIDER NOTES
Encounter Date: 5/10/2025       History     Chief Complaint   Patient presents with    Flank Pain    Vomiting     Pt c/o L flank pain and several bouts of vomiting since 02:00 hrs this am. Pt endorses normal urination and defalcation. Denies fever, chills and body aches. Pain 10/10.      44-year-old male with history of DM, previous DVT/PE with factor 5 Leiden still on Eliquis, presents for evaluation of acute onset left-sided abdominal pain.  He was at normal baseline last night, woke up at 2:00 a.m. with left-sided abdominal pain that caused in urge to have a bowel movement.  Pain has been colicky and persistent since then, with nausea and multiple episodes of nonbloody emesis as well.  He denies any hematuria or dysuria associated, no fevers or other associated complaints.  He has normal BMs in his not constipated.  No chest pain, SOB, cough, or other new complaints.  No history of similar previous episodes      Review of patient's allergies indicates:  No Known Allergies  Past Medical History:   Diagnosis Date    Asthma     Bilateral pulmonary embolism     Diabetes mellitus, type 2     DVT (deep venous thrombosis)      Past Surgical History:   Procedure Laterality Date    HERNIA REPAIR      VASECTOMY  4/2015     Family History   Problem Relation Name Age of Onset    Cancer Mother Jessy Devries         breast/lung    Diabetes Sister      Stroke Brother       Social History[1]  Review of Systems   Constitutional:  Negative for fever.   HENT:  Negative for congestion.    Eyes:  Negative for redness.   Respiratory:  Negative for shortness of breath.    Cardiovascular:  Negative for chest pain.   Gastrointestinal:  Positive for abdominal pain, nausea and vomiting.   Genitourinary:  Negative for dysuria.   Skin:  Negative for rash.   Neurological:  Negative for headaches.   Psychiatric/Behavioral:  Negative for confusion.        Physical Exam     Initial Vitals [05/10/25 0455]   BP Pulse Resp Temp SpO2   (!) 167/99 82  20 98 °F (36.7 °C) 99 %      MAP       --         Physical Exam    Nursing note and vitals reviewed.  Constitutional: He appears well-developed and well-nourished. He is not diaphoretic. No distress.   HENT:   Head: Normocephalic and atraumatic.   Eyes: Conjunctivae are normal. No scleral icterus.   Neck: Neck supple.   Cardiovascular:  Normal rate, regular rhythm, normal heart sounds and intact distal pulses.           No murmur heard.  Pulmonary/Chest: Breath sounds normal. No respiratory distress. He has no wheezes. He has no rhonchi. He has no rales.   Abdominal: Abdomen is soft. There is abdominal tenderness.   Mild left lower quadrant tenderness There is no rebound and no guarding.   Musculoskeletal:         General: No edema.      Cervical back: Neck supple.     Neurological: He is alert and oriented to person, place, and time.   Skin: Skin is warm and dry.   Psychiatric: He has a normal mood and affect.         ED Course   Procedures  Labs Reviewed   COMPREHENSIVE METABOLIC PANEL - Abnormal       Result Value    Sodium 136      Potassium 4.0      Chloride 102      CO2 20 (*)     Glucose 408 (*)     BUN 24 (*)     Creatinine 1.4      Calcium 8.9      Protein Total 8.2      Albumin 4.0      Bilirubin Total 0.5      ALP 52      AST 18      ALT 22      Anion Gap 14      eGFR >60      Narrative:     Specimen slightly hemolyzed    LIPASE - Abnormal    Lipase Level 78 (*)    URINALYSIS, REFLEX TO URINE CULTURE - Abnormal    Color, UA Colorless (*)     Appearance, UA Clear      pH, UA 5.0      Spec Grav UA 1.030      Protein, UA Negative      Glucose, UA 4+ (*)     Ketones, UA Negative      Bilirubin, UA Negative      Blood, UA 1+ (*)     Nitrites, UA Negative      Urobilinogen, UA Negative      Leukocyte Esterase, UA Negative     CBC WITH DIFFERENTIAL - Abnormal    WBC 8.51      RBC 5.27      HGB 14.2      HCT 42.4      MCV 81 (*)     MCH 26.9 (*)     MCHC 33.5      RDW 12.5      Platelet Count 190      MPV 11.7       Nucleated RBC 0      Neut % 51.9      Lymph % 37.0      Mono % 8.2      Eos % 2.5      Basophil % 0.2      Imm Grans % 0.2      Neut # 4.41      Lymph # 3.15      Mono # 0.70      Eos # 0.21      Baso # 0.02      Imm Grans # 0.02     URINALYSIS MICROSCOPIC - Abnormal    RBC, UA >100 (*)     WBC, UA 1      Bacteria, UA Rare      Yeast, UA None      Squamous Epithelial Cells, UA 1      Microscopic Comment       POCT GLUCOSE - Abnormal    POCT Glucose 390 (*)    MAGNESIUM - Normal    Magnesium  1.7     CBC W/ AUTO DIFFERENTIAL    Narrative:     The following orders were created for panel order CBC auto differential.  Procedure                               Abnormality         Status                     ---------                               -----------         ------                     CBC with Differential[[id number]]       Abnormal            Final result                 Please view results for these tests on the individual orders.   GREY TOP URINE HOLD    Extra Tube Hold for add-ons.            Imaging Results               CT Abdomen Pelvis  Without Contrast (Final result)  Result time 05/10/25 07:41:26      Final result by Jude Aguirre MD (05/10/25 07:41:26)                   Impression:      3 mm stone at the left ureterovesicular junction with mild-to-moderate obstructive uropathy.  Significant left perinephric fat stranding and small volume free fluid in the left retroperitoneum, suggestive of calyceal rupture or superimposed infection.  Suggest correlation with symptoms and urinalysis.    Hepatomegaly.  Few indeterminate hypoattenuating lesions in the liver, potentially hemangiomas.  Suggest follow-up with outpatient multiphase abdominal MRI if there is no prior imaging available to confirm stability.    Other incidental findings discussed in the body of the report.    This report was flagged in Epic as abnormal.      Electronically signed by: Jude Aguirre MD  Date:    05/10/2025  Time:    07:41                Narrative:    EXAMINATION:  CT ABDOMEN PELVIS WITHOUT CONTRAST    CLINICAL HISTORY:  LLQ abdominal pain;    TECHNIQUE:  Low dose axial images, sagittal and coronal reformations were obtained from the lung bases to the pubic symphysis. Oral and IV contrast not administered.    COMPARISON:  CTA chest, 08/15/2022.    FINDINGS:  Lower chest: Heart size is normal. Lung bases are essentially clear. No pleural or pericardial effusion.    Abdomen:    Evaluation of the solid abdominal organs and bowel is limited in the absence of IV contrast.    Liver is enlarged, measuring 20 cm craniocaudal length.  Hypoattenuating lesion in the right hepatic dome measuring up to 2.8 cm in size (axial image 16).  This lesion is denser than expected for a simple cyst.  This lesion was not definitely seen on prior CTA chest in 2022, though steatosis was worse on that study, which may limit detection of this finding on a dedicated CTA chest.  This lesion is questionably seen on the prior CTA on the MIP postcontrast images.  There is additional hypoattenuating lesion in the anterior left hepatic lobe measuring up to 2.6 cm in size (axial image 43).  Additional smaller indeterminate lesion in the inferior right hepatic lobe (axial image 62).  Questionable additional lesion in the posterior right hepatic lobe (axial image 40).  No significant steatosis on the current exam.  Gallbladder is unremarkable. No calcified gallstones. No intra-or extrahepatic biliary ductal dilatation.    Spleen, adrenals, and pancreas are unremarkable.    Asymmetric left perinephric inflammatory changes with small volume left perinephric fluid.  Mild-to-moderate hydroureteronephrosis to the level of a 3 mm stone in the ureterovesicular junction (coronal image 83 and axial image 183).  No sizable nonobstructing renal stones.  No right hydronephrosis.    No small bowel obstruction.  Colonic diverticulosis.  Normal appendix.    No peritoneal free fluid or  pneumoperitoneum.    Abdominal aorta is normal in course and caliber.    No bulky retroperitoneal lymphadenopathy.    Pelvis: Urinary bladder, rectum, and prostate are unremarkable.  No free fluid in the pelvis.    Bones and soft tissues: No aggressive osseous lesions. Degenerative changes in the spine.  Minimal fat containing umbilical hernia.  Extraperitoneal soft tissues are negative for acute finding.                                       Medications   sodium chloride 0.9% bolus 1,000 mL 1,000 mL (0 mLs Intravenous Stopped 5/10/25 0637)   morphine injection 4 mg (4 mg Intravenous Given 5/10/25 0523)   ondansetron injection 4 mg (4 mg Intravenous Given 5/10/25 0524)   ketorolac injection 9.999 mg (9.999 mg Intravenous Given 5/10/25 0558)   HYDROmorphone injection 1 mg (1 mg Intravenous Given 5/10/25 0719)   sodium chloride 0.9% bolus 1,000 mL 1,000 mL (0 mLs Intravenous Stopped 5/10/25 0948)   tamsulosin 24 hr capsule 0.4 mg (0.4 mg Oral Given 5/10/25 0807)   ketorolac injection 9.999 mg (9.999 mg Intravenous Given 5/10/25 0832)   HYDROmorphone injection 1 mg (1 mg Intravenous Given 5/10/25 0832)   insulin regular injection 6 Units 0.06 mL (6 Units Intravenous Given 5/10/25 0944)   oxyCODONE-acetaminophen 5-325 mg per tablet 2 tablet (2 tablets Oral Given 5/10/25 0955)     Medical Decision Making      44-year-old male with history of DM, previous DVT/PE with factor 5 Leiden still on Eliquis, presents for evaluation of acute onset left-sided abdominal pain.  Patient was at normal baseline, woke up at 2:00 a.m. with colicky left-sided abdominal pain with associated nausea and multiple episodes of nonbloody emesis.  Pain initially made him feel like he had to have a BM but he is not constipated, denies any associated urinary symptoms or fevers.  No history of similar previous episodes.  On arrival patient with slightly elevated blood pressure but otherwise normal vitals; he does have mild left lower quadrant  tenderness but no left CVA tenderness, no acute abdomen or other concerning exam findings.  Differential diagnosis includes diverticulitis, renal colic, UTI.      UA with greater than 100 WBCs but no sign of UTI.  No leukocytosis on CBC, CMP with CR 1.4 (baseline 1- 1.2) and glucose 408 with no elevated anion gap to suggest DKA.  Lipase is mildly elevated but no epigastric tenderness or risk factors for acute pancreatitis.  CT abdomen without contrast done given microscopic hematuria concerning for renal colic, and per my independent interpretation patient does have obstructing left UVJ stone with mild hydronephrosis which is likely cause of his symptoms.  After IV more friends/Zofran he still had persistent pain so was then was given Toradol with further improvement.  Patient signed out to isra LEBRON pending CT read, can be discharged if pain/nausea controlled for outpatient Urology treatment, versus admission for pain control and urology consult.      Per chart review, CT showed 3 mm stone at left UVJ with mild-to-moderate obstructive uropathy, possible calyceal rupture and left perinephric fat stranding.  Patient re-evaluated by Dr. More and he elected for trial of outpatient management with oral pain medications, after IVF glucose improved to 390.      Amount and/or Complexity of Data Reviewed  Labs: ordered.  Radiology: ordered.    Risk  Prescription drug management.               ED Course as of 05/10/25 2100   Sat May 10, 2025   0951 On reassessment, patient states pain is starting to return.  We did discuss discharge planning.  Will give oral analgesics, IV insulin to treat hyperglycemia.  Will reassess 1 hour after medication and plan for admission versus discharge. [AK]      ED Course User Index  [AK] Rosa More MD                           Clinical Impression:  Final diagnoses:  [N23] Renal colic (Primary)  [N20.0] Kidney stone          ED Disposition Condition    Discharge Stable          ED  Prescriptions       Medication Sig Dispense Start Date End Date Auth. Provider    oxyCODONE-acetaminophen (PERCOCET) 5-325 mg per tablet Take 1 tablet by mouth every 6 (six) hours as needed for Pain. 12 tablet 5/10/2025 -- Rosa More MD    naproxen (NAPROSYN) 500 MG tablet Take 1 tablet (500 mg total) by mouth 2 (two) times daily with meals. for 10 days 20 tablet 5/10/2025 5/20/2025 Rosa More MD    tamsulosin (FLOMAX) 0.4 mg Cap Take 1 capsule (0.4 mg total) by mouth once daily. 30 capsule 5/10/2025 5/10/2026 Rosa More MD    ondansetron (ZOFRAN-ODT) 4 MG TbDL Take 1 tablet (4 mg total) by mouth every 6 (six) hours as needed (nausea/vomiting). 9 tablet 5/10/2025 -- Rosa More MD          Follow-up Information       Follow up With Specialties Details Why Contact Info Additional Information    [facility] Urology Schedule an appointment as soon as possible for a visit  A referral was placed, they will call you to schedule.  If you have not heard from anyone by Tuesday morning please call and schedule. [address], [address]  [facility] [address] [phone] Urology - [facility], 6th Floor, [address]. Please park in the [place] [address]. Please come with a full bladder to in office visit to provide a urine specimen when you arrive.    [facility] - Emergency Dept Emergency Medicine  As needed, If symptoms worsen [address]  [facility] [address] [phone]                  [1]   Social History  Tobacco Use    Smoking status: Never     Passive exposure: Never    Smokeless tobacco: Never   Substance Use Topics    Alcohol use: Yes     Alcohol/week: 2.0 standard drinks of alcohol     Types: 2 Shots of liquor per week    Drug use: Never        Stan Roca MD  05/10/25 [id number]

## 2025-05-10 NOTE — ED NOTES
"Pt resting in bed, reports flank pain is "coming back" and requesting pain medicine. Respirations even and unlabored. Aaox4.   "

## 2025-06-07 DIAGNOSIS — I26.99 BILATERAL PULMONARY EMBOLISM: ICD-10-CM

## 2025-06-07 NOTE — TELEPHONE ENCOUNTER
Care Due:                  Date            Visit Type   Department     Provider  --------------------------------------------------------------------------------    Last Visit: None Found      None         None Found  Next Visit: None Scheduled  None         None Found                                                            Last  Test          Frequency    Reason                     Performed    Due Date  --------------------------------------------------------------------------------    Office Visit  15 months..  EScitalopram, apixaban,    Not Found    Overdue                             metFORMIN................    HBA1C.......  6 months...  metFORMIN................  07- 01-    [facility] Embedded Care Due Messages. Reference number: [id number].   6/07/2025 5:00:07 PM CDT

## 2025-06-09 RX ORDER — APIXABAN 5 MG/1
5 TABLET, FILM COATED ORAL 2 TIMES DAILY
Qty: 180 TABLET | Refills: 0 | Status: SHIPPED | OUTPATIENT
Start: 2025-06-09

## 2025-06-20 ENCOUNTER — TELEPHONE (OUTPATIENT)
Dept: UROLOGY | Facility: CLINIC | Age: 45
End: 2025-06-20

## 2025-06-23 ENCOUNTER — OFFICE VISIT (OUTPATIENT)
Dept: UROLOGY | Facility: CLINIC | Age: 45
End: 2025-06-23
Attending: EMERGENCY MEDICINE
Payer: COMMERCIAL

## 2025-06-23 VITALS
HEART RATE: 77 BPM | HEIGHT: 71 IN | SYSTOLIC BLOOD PRESSURE: 144 MMHG | BODY MASS INDEX: 41.84 KG/M2 | DIASTOLIC BLOOD PRESSURE: 84 MMHG | OXYGEN SATURATION: 96 %

## 2025-06-23 DIAGNOSIS — E11.65 TYPE 2 DIABETES MELLITUS WITH HYPERGLYCEMIA, WITHOUT LONG-TERM CURRENT USE OF INSULIN: ICD-10-CM

## 2025-06-23 DIAGNOSIS — N13.2 URETERAL STONE WITH HYDRONEPHROSIS: ICD-10-CM

## 2025-06-23 DIAGNOSIS — R30.0 DYSURIA: Primary | ICD-10-CM

## 2025-06-23 LAB
BILIRUB SERPL-MCNC: NORMAL MG/DL
BLOOD URINE, POC: NORMAL
CLARITY, POC UA: CLEAR
COLOR, POC UA: YELLOW
GLUCOSE UR QL STRIP: 1000
KETONES UR QL STRIP: NORMAL
LEUKOCYTE ESTERASE URINE, POC: NORMAL
NITRITE, POC UA: NORMAL
PH, POC UA: 5
PROTEIN, POC: NORMAL
SPECIFIC GRAVITY, POC UA: 1.02
UROBILINOGEN, POC UA: NORMAL

## 2025-06-23 PROCEDURE — 99204 OFFICE O/P NEW MOD 45 MIN: CPT | Mod: S$GLB,,, | Performed by: NURSE PRACTITIONER

## 2025-06-23 PROCEDURE — 81002 URINALYSIS NONAUTO W/O SCOPE: CPT | Mod: S$GLB,,, | Performed by: NURSE PRACTITIONER

## 2025-06-23 RX ORDER — CLOTRIMAZOLE 1 %
CREAM (GRAM) TOPICAL 2 TIMES DAILY
Qty: 45 G | Refills: 0 | Status: SHIPPED | OUTPATIENT
Start: 2025-06-23 | End: 2025-07-07

## 2025-06-23 NOTE — PROGRESS NOTES
"Subjective:      Hany Devries is a 44 y.o. male who was referred by Dr. More for evaluation of his ureteral stone.    The patient presented to the ED on 5/10 with L flank pain and N/V.     CT demonstrated "3 mm stone at the left ureterovesicular junction with mild-to-moderate obstructive uropathy.  Significant left perinephric fat stranding and small volume free fluid in the left retroperitoneum, suggestive of calyceal rupture or superimposed infection.  Suggest correlation with symptoms and urinalysis." Creatinine WNL. UA without concern for infection. Discharged with flomax, zofran and percocet.     He denies further pain since leaving the ER. Denies seeing the stone pass. Denies flank pain, gross hematuria and fever/chills.  Denies a prior history of stones. Drinks lots of water.    DM with uncontrolled BG. Reports penile irritation- similar to prior yeast infections.     The following portions of the patient's history were reviewed and updated as appropriate: allergies, current medications, past family history, past medical history, past social history, past surgical history and problem list.    Review of Systems  Constitutional: no fever or chills  ENT: no nasal congestion or sore throat  Respiratory: no cough or shortness of breath  Cardiovascular: no chest pain or palpitations  Gastrointestinal: no nausea or vomiting, tolerating diet  Genitourinary: as per HPI  Hematologic/Lymphatic: no easy bruising or lymphadenopathy  Musculoskeletal: no arthralgias or myalgias  Neurological: no seizures or tremors  Behavioral/Psych: no auditory or visual hallucinations     Objective:   Vitals: BP (!) 144/84 (BP Location: Left arm, Patient Position: Sitting)   Pulse 77   Ht 5' 11" (1.803 m)   SpO2 96%   BMI 41.84 kg/m²     Physical Exam   General: alert and oriented, no acute distress  Head: normocephalic, atraumatic  Neck: supple, normal ROM  Respiratory: Symmetric expansion, non-labored breathing  Cardiovascular: " "regular rate and rhythm  Abdomen: soft, non tender, non distended  Genitourinary: deferred  Skin: normal coloration and turgor, no rashes, no suspicious skin lesions noted  Neuro: alert and oriented x3, no gross deficits  Psych: normal judgment and insight, normal mood/affect, and non-anxious    Lab Review   Urinalysis demonstrates: +++glucose  Lab Results   Component Value Date    WBC 8.51 05/10/2025    HGB 14.2 05/10/2025    HGB 13.9 (L) 07/31/2024    HCT 42.4 05/10/2025    HCT 44.0 07/31/2024    MCV 81 (L) 05/10/2025    MCV 85 07/31/2024     05/10/2025     07/31/2024     Lab Results   Component Value Date    CREATININE 1.4 05/10/2025    BUN 24 (H) 05/10/2025     No results found for: "PSA"  Imaging   (all images personally reviewed; agree with report below)  CTRSS- 3 mm stone at the left ureterovesicular junction with mild-to-moderate obstructive uropathy.  Significant left perinephric fat stranding and small volume free fluid in the left retroperitoneum, suggestive of calyceal rupture or superimposed infection.  Suggest correlation with symptoms and urinalysis."     Assessment:     1. Dysuria    2. Ureteral stone with hydronephrosis      Plan:   Diagnoses and all orders for this visit:    Dysuria  -     clotrimazole (LOTRIMIN) 1 % cream; Apply topically 2 (two) times daily. for 14 days    Ureteral stone with hydronephrosis  -     Ambulatory referral/consult to Urology  -     POCT URINE DIPSTICK WITHOUT MICROSCOPE  -     US Retroperitoneal Complete; Future    Plan:  --KATIE to ensure the resolution of hydro, suspect that the stone has passed given the size, absence of pain and negative UA today  --Tight BG control- follow up closely with PCP  --Lotrimin PRN for yeast- cannot take diflucan due to medication interactions  --Recommend general preventive measures, to include increased hydration, low Na diet, and increased citrus intake  --Follow up annually with KUB     "

## 2025-06-24 RX ORDER — CALCIUM CITRATE/VITAMIN D3 200MG-6.25
TABLET ORAL
Qty: 100 STRIP | Refills: 3 | Status: SHIPPED | OUTPATIENT
Start: 2025-06-24

## 2025-06-24 RX ORDER — ORAL SEMAGLUTIDE 14 MG/1
14 TABLET ORAL
Qty: 90 TABLET | Refills: 1 | OUTPATIENT
Start: 2025-06-24

## 2025-06-26 RX ORDER — BLOOD-GLUCOSE METER
EACH MISCELLANEOUS
Qty: 1 EACH | Refills: 0 | Status: SHIPPED | OUTPATIENT
Start: 2025-06-26

## 2025-07-09 ENCOUNTER — PATIENT MESSAGE (OUTPATIENT)
Dept: INTERNAL MEDICINE | Facility: CLINIC | Age: 45
End: 2025-07-09
Payer: COMMERCIAL

## 2025-07-09 DIAGNOSIS — E11.9 TYPE 2 DIABETES MELLITUS WITHOUT COMPLICATION, WITHOUT LONG-TERM CURRENT USE OF INSULIN: ICD-10-CM

## 2025-07-09 RX ORDER — TIRZEPATIDE 15 MG/.5ML
INJECTION, SOLUTION SUBCUTANEOUS
Qty: 4 PEN | Refills: 5 | Status: SHIPPED | OUTPATIENT
Start: 2025-07-09

## 2025-07-09 RX ORDER — INSULIN GLARGINE-YFGN 100 [IU]/ML
INJECTION, SOLUTION SUBCUTANEOUS
Qty: 15 ML | Refills: 5 | Status: SHIPPED | OUTPATIENT
Start: 2025-07-09

## 2025-07-09 NOTE — TELEPHONE ENCOUNTER
No care due was identified.  [facility] Embedded Care Due Messages. Reference number: [id number].   7/09/2025 4:24:15 PM CDT

## 2025-07-10 RX ORDER — METFORMIN HYDROCHLORIDE 500 MG/1
1000 TABLET, EXTENDED RELEASE ORAL 2 TIMES DAILY WITH MEALS
Qty: 360 TABLET | Refills: 0 | Status: SHIPPED | OUTPATIENT
Start: 2025-07-10

## 2025-07-31 ENCOUNTER — TELEPHONE (OUTPATIENT)
Dept: INTERNAL MEDICINE | Facility: CLINIC | Age: 45
End: 2025-07-31
Payer: COMMERCIAL

## 2025-07-31 NOTE — TELEPHONE ENCOUNTER
----- Message from TOMA Holloway, FAVIO sent at 7/31/2025  2:42 PM CDT -----  Regarding: jackie  Hi!  Pt needs reschedule  Thanks  Santos

## 2025-08-12 DIAGNOSIS — I26.99 BILATERAL PULMONARY EMBOLISM: ICD-10-CM

## 2025-08-16 DIAGNOSIS — F41.9 ANXIETY: ICD-10-CM

## 2025-08-16 DIAGNOSIS — F32.A DEPRESSION, UNSPECIFIED DEPRESSION TYPE: ICD-10-CM

## 2025-08-18 RX ORDER — ESCITALOPRAM OXALATE 5 MG/1
5 TABLET ORAL
Qty: 90 TABLET | Refills: 0 | Status: SHIPPED | OUTPATIENT
Start: 2025-08-18

## 2025-08-19 RX ORDER — INSULIN GLARGINE-YFGN 100 [IU]/ML
INJECTION, SOLUTION SUBCUTANEOUS
Qty: 15 ML | Refills: 5 | Status: SHIPPED | OUTPATIENT
Start: 2025-08-19